# Patient Record
Sex: MALE | Race: BLACK OR AFRICAN AMERICAN | Employment: OTHER | URBAN - METROPOLITAN AREA
[De-identification: names, ages, dates, MRNs, and addresses within clinical notes are randomized per-mention and may not be internally consistent; named-entity substitution may affect disease eponyms.]

---

## 2022-03-18 PROBLEM — N43.3 HYDROCELE: Status: ACTIVE | Noted: 2021-03-17

## 2022-03-19 PROBLEM — R20.0 NUMBNESS IN BOTH LEGS: Status: ACTIVE | Noted: 2019-09-18

## 2022-03-19 PROBLEM — R25.3 FASCICULATIONS: Status: ACTIVE | Noted: 2019-09-18

## 2022-03-19 PROBLEM — E66.9 OBESITY (BMI 30-39.9): Status: ACTIVE | Noted: 2017-11-06

## 2022-03-19 PROBLEM — M79.89 SWELLING OF BOTH LOWER EXTREMITIES: Status: ACTIVE | Noted: 2021-04-22

## 2022-03-19 PROBLEM — J30.9 ALLERGIC RHINITIS: Status: ACTIVE | Noted: 2017-11-06

## 2022-03-19 PROBLEM — R49.9 VOICE DISORDER: Status: ACTIVE | Noted: 2018-08-13

## 2022-03-19 PROBLEM — R30.0 DYSURIA: Status: ACTIVE | Noted: 2018-08-13

## 2023-06-23 ENCOUNTER — TELEPHONE (OUTPATIENT)
Age: 63
End: 2023-06-23

## 2023-07-18 ENCOUNTER — HOSPITAL ENCOUNTER (EMERGENCY)
Facility: HOSPITAL | Age: 63
Discharge: HOME | End: 2023-07-18
Attending: STUDENT IN AN ORGANIZED HEALTH CARE EDUCATION/TRAINING PROGRAM
Payer: COMMERCIAL

## 2023-07-18 ENCOUNTER — APPOINTMENT (OUTPATIENT)
Facility: HOSPITAL | Age: 63
End: 2023-07-18
Payer: COMMERCIAL

## 2023-07-18 VITALS
SYSTOLIC BLOOD PRESSURE: 172 MMHG | DIASTOLIC BLOOD PRESSURE: 106 MMHG | RESPIRATION RATE: 18 BRPM | OXYGEN SATURATION: 95 % | WEIGHT: 287.7 LBS | BODY MASS INDEX: 40.28 KG/M2 | TEMPERATURE: 98.1 F | HEART RATE: 80 BPM | HEIGHT: 71 IN

## 2023-07-18 DIAGNOSIS — N43.3 HYDROCELE OF TESTIS: Primary | ICD-10-CM

## 2023-07-18 LAB
APPEARANCE UR: CLEAR
BACTERIA URNS QL MICRO: NEGATIVE /HPF
BILIRUB UR QL: NEGATIVE
COLOR UR: ABNORMAL
EPITH CASTS URNS QL MICRO: ABNORMAL /LPF
GLUCOSE UR STRIP.AUTO-MCNC: NEGATIVE MG/DL
HGB UR QL STRIP: NEGATIVE
HYALINE CASTS URNS QL MICRO: ABNORMAL /LPF (ref 0–2)
KETONES UR QL STRIP.AUTO: ABNORMAL MG/DL
LEUKOCYTE ESTERASE UR QL STRIP.AUTO: NEGATIVE
NITRITE UR QL STRIP.AUTO: NEGATIVE
PH UR STRIP: 5 (ref 5–8)
PROT UR STRIP-MCNC: NEGATIVE MG/DL
RBC #/AREA URNS HPF: ABNORMAL /HPF (ref 0–5)
SP GR UR REFRACTOMETRY: 1.02
URINE CULTURE IF INDICATED: ABNORMAL
UROBILINOGEN UR QL STRIP.AUTO: 1 EU/DL (ref 0.2–1)
WBC URNS QL MICRO: ABNORMAL /HPF (ref 0–4)

## 2023-07-18 PROCEDURE — 99284 EMERGENCY DEPT VISIT MOD MDM: CPT

## 2023-07-18 PROCEDURE — 81001 URINALYSIS AUTO W/SCOPE: CPT

## 2023-07-18 PROCEDURE — 76870 US EXAM SCROTUM: CPT

## 2023-07-18 ASSESSMENT — PAIN SCALES - GENERAL
PAINLEVEL_OUTOF10: 4
PAINLEVEL_OUTOF10: 5

## 2023-07-18 ASSESSMENT — LIFESTYLE VARIABLES
HOW OFTEN DO YOU HAVE A DRINK CONTAINING ALCOHOL: 2-4 TIMES A MONTH
HOW MANY STANDARD DRINKS CONTAINING ALCOHOL DO YOU HAVE ON A TYPICAL DAY: 1 OR 2

## 2023-07-18 ASSESSMENT — PAIN DESCRIPTION - LOCATION
LOCATION: SCROTUM
LOCATION: SCROTUM

## 2023-07-19 NOTE — DISCHARGE INSTRUCTIONS
You have a hydrocele in your testicles, this is a collection of fluid. A hydrocele is a collection of peritoneal fluid between the parietal and visceral layers of the tunica vaginalis, which directly surrounds the testis and spermatic cord. Hydroceles range in size from small, soft collections that still allow palpation of the scrotal contents to massive, tense collections of several liters that make examination hard. Pain and disability generally correlate with the size. Most hydroceles do not require intervention.

## 2023-07-19 NOTE — ED PROVIDER NOTES
EMERGENCY DEPARTMENT HISTORY AND PHYSICAL EXAM      Date: 7/18/2023  Patient Name: Trav Davila    History of Presenting Illness     Chief Complaint   Patient presents with    Groin Swelling     Patient presents to triage ambulatory complaining of bilateral scrotum swelling. Patient denies any recent falls, injuries or STD/STI exposure. HPI: History From: patient, History limited by: none  Trav Davila, 70 y.o. male presents to the ED with cc of \"hydrocele. \"  He reports being diagnosed with a hydrocele many years ago, he follows with a urologist who has drained it in the past, he states it was last drained about a year ago. It has been gradually increasing in size over the last few months. He reports associated discomfort due to the swelling. He denies any vomiting, diarrhea, fevers, dysuria or hematuria. He has an appointment with his urologist in August.          There are no other complaints, changes, or physical findings at this time. PCP: No primary care provider on file. No current facility-administered medications on file prior to encounter.      Current Outpatient Medications on File Prior to Encounter   Medication Sig Dispense Refill    aspirin 81 MG chewable tablet Take 1 tablet by mouth daily      cetirizine (ZYRTEC) 10 MG tablet Take by mouth      vitamin D 25 MCG (1000 UT) CAPS Take by mouth daily         Past History     Past Medical History:  Past Medical History:   Diagnosis Date    Allergic rhinitis 11/6/2017    Atrial fibrillation (HCC)     Constipation     Hemorrhoids     HTN (hypertension) 12/13/2012    Hypertension     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       Past Surgical History:  Past Surgical History:   Procedure Laterality Date    HEMORRHOID SURGERY  1993       Family History:  Family History   Problem Relation Age of Onset    Hypertension Mother     Diabetes Mother     Cancer Father

## 2023-07-19 NOTE — ED NOTES
Patient stable GCS15  Discharged summary explained and understood      Liborio De Leon RN  07/18/23 4319

## 2023-08-07 PROBLEM — N43.3 HYDROCELE: Status: ACTIVE | Noted: 2021-03-17

## 2023-08-10 ENCOUNTER — OFFICE VISIT (OUTPATIENT)
Age: 63
End: 2023-08-10
Payer: MEDICARE

## 2023-08-10 VITALS — OXYGEN SATURATION: 100 % | HEIGHT: 71 IN | TEMPERATURE: 97.8 F | WEIGHT: 255 LBS | BODY MASS INDEX: 35.7 KG/M2

## 2023-08-10 DIAGNOSIS — N43.3 HYDROCELE, UNSPECIFIED HYDROCELE TYPE: ICD-10-CM

## 2023-08-10 DIAGNOSIS — N48.83 ACQUIRED BURIED PENIS: ICD-10-CM

## 2023-08-10 PROCEDURE — G8417 CALC BMI ABV UP PARAM F/U: HCPCS | Performed by: UROLOGY

## 2023-08-10 PROCEDURE — G8427 DOCREV CUR MEDS BY ELIG CLIN: HCPCS | Performed by: UROLOGY

## 2023-08-10 PROCEDURE — 3017F COLORECTAL CA SCREEN DOC REV: CPT | Performed by: UROLOGY

## 2023-08-10 PROCEDURE — 99214 OFFICE O/P EST MOD 30 MIN: CPT | Performed by: UROLOGY

## 2023-08-10 PROCEDURE — 1123F ACP DISCUSS/DSCN MKR DOCD: CPT | Performed by: UROLOGY

## 2023-08-10 PROCEDURE — 81003 URINALYSIS AUTO W/O SCOPE: CPT | Performed by: UROLOGY

## 2023-08-10 PROCEDURE — 1036F TOBACCO NON-USER: CPT | Performed by: UROLOGY

## 2023-08-10 NOTE — ASSESSMENT & PLAN NOTE
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 This is not an immediate concern for him.

## 2023-08-10 NOTE — PROGRESS NOTES
HISTORY OF PRESENT ILLNESS  Tien Leung is a 70 y.o. male. has a past medical history of Allergic rhinitis, Atrial fibrillation ([address]), Constipation, Hemorrhoids, HTN (hypertension), Hypertension, Obesity (BMI 30-39.9), Other ill-defined conditions(799.89), and Voice disorder. has a past surgical history that includes Hemorrhoid surgery (1993). Chief Complaint   Patient presents with    Follow-up    Surgical Consult     Wants to discuss hydrocele      He last had a virtual visit with me December 8, 2021. We discussed surgical management of his hydrocele. He had deferred management and now comes in to discuss this again. He has a history of a large right hydrocele. It has been increasing in size. Left he found more uncomfortable and is ready to have surgical management. Other  This is a chronic problem. The current episode started more than 1 year ago. The problem occurs constantly. The problem has been gradually worsening. The symptoms are aggravated by standing and walking. Treatments tried: aspiration with recurrence. The treatment provided moderate relief. 1. Hydrocele, unspecified hydrocele type  Overview:  He has a prior hydrocele drainage 3/9/16 bilaterally, about 250cc each. He had deferred surgical management. He has preferred to repeat drainage. Assessment & Plan:   Large right hydrocele. We discussed surgical management. The risk, benefits and expected course were discussed. He is likely have postoperative swelling which is exposed expected to improve. He had the appearance questions and wishes to proceed. Orders:  -     AMB POC URINALYSIS DIP STICK AUTO W/O MICRO  -     AMB POC URINALYSIS DIP STICK AUTO W/O MICRO  2. 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 This is not an immediate concern for him.   Orders:  -     AMB POC URINALYSIS DIP STICK AUTO W/O MICRO        Past Medical History:    PMHx (including

## 2023-08-10 NOTE — ASSESSMENT & PLAN NOTE
Large right hydrocele. We discussed surgical management. The risk, benefits and expected course were discussed. He is likely have postoperative swelling which is exposed expected to improve. He had the appearance questions and wishes to proceed.

## 2023-08-11 LAB
BILIRUBIN, URINE, POC: NEGATIVE
BLOOD URINE, POC: NEGATIVE
GLUCOSE URINE, POC: NEGATIVE
KETONES, URINE, POC: NORMAL
LEUKOCYTE ESTERASE, URINE, POC: NEGATIVE
NITRITE, URINE, POC: NEGATIVE
PH, URINE, POC: 5 (ref 4.6–8)
PROTEIN,URINE, POC: NEGATIVE
SPECIFIC GRAVITY, URINE, POC: 1.03 (ref 1–1.03)
URINALYSIS CLARITY, POC: CLEAR
URINALYSIS COLOR, POC: YELLOW
UROBILINOGEN, POC: NORMAL

## 2023-10-16 ENCOUNTER — TELEPHONE (OUTPATIENT)
Age: 63
End: 2023-10-16

## 2023-10-20 ENCOUNTER — OFFICE VISIT (OUTPATIENT)
Age: 63
End: 2023-10-20

## 2023-10-20 VITALS — SYSTOLIC BLOOD PRESSURE: 158 MMHG | HEART RATE: 111 BPM | DIASTOLIC BLOOD PRESSURE: 109 MMHG

## 2023-10-20 DIAGNOSIS — N43.3 HYDROCELE, UNSPECIFIED HYDROCELE TYPE: ICD-10-CM

## 2023-10-20 PROCEDURE — 99024 POSTOP FOLLOW-UP VISIT: CPT | Performed by: UROLOGY

## 2023-10-20 NOTE — PROGRESS NOTES
Chief Complaint   Patient presents with    Follow-up    Other     hydrocelectomy        BP (!) 158/109   Pulse (!) 111      PHQ-9 score is    Negative      1. \"Have you been to the ER, urgent care clinic since your last visit? Hospitalized since your last visit? \" No    2. \"Have you seen or consulted any other health care providers outside of the [address] [place] [address] since your last visit? \" No     3. For patients aged 43-73: Has the patient had a colonoscopy / FIT/ Cologuard? Yes - no Care Gap present      If the patient is female:    4. For patients aged 43-66: Has the patient had a mammogram within the past 2 years? NA - based on age or sex      11. For patients aged 21-65: Has the patient had a pap smear?  NA - based on age or sex

## 2024-03-24 ENCOUNTER — APPOINTMENT (OUTPATIENT)
Facility: HOSPITAL | Age: 64
End: 2024-03-24
Payer: MEDICARE

## 2024-03-24 ENCOUNTER — HOSPITAL ENCOUNTER (EMERGENCY)
Facility: HOSPITAL | Age: 64
Discharge: ANOTHER HOSPITAL | End: 2024-03-24
Attending: EMERGENCY MEDICINE
Payer: MEDICARE

## 2024-03-24 ENCOUNTER — ANESTHESIA (OUTPATIENT)
Facility: HOSPITAL | Age: 64
End: 2024-03-24
Payer: MEDICARE

## 2024-03-24 ENCOUNTER — HOSPITAL ENCOUNTER (INPATIENT)
Facility: HOSPITAL | Age: 64
LOS: 4 days | Discharge: NURSING, REHAB OR LONG-TERM CARE | DRG: 064 | End: 2024-03-28
Attending: INTERNAL MEDICINE | Admitting: INTERNAL MEDICINE
Payer: MEDICARE

## 2024-03-24 ENCOUNTER — HOSPITAL ENCOUNTER (EMERGENCY)
Facility: HOSPITAL | Age: 64
Discharge: HOME | DRG: 064 | End: 2024-03-27
Payer: MEDICARE

## 2024-03-24 ENCOUNTER — APPOINTMENT (OUTPATIENT)
Facility: HOSPITAL | Age: 64
DRG: 064 | End: 2024-03-24
Payer: MEDICARE

## 2024-03-24 ENCOUNTER — ANESTHESIA EVENT (OUTPATIENT)
Facility: HOSPITAL | Age: 64
End: 2024-03-24
Payer: MEDICARE

## 2024-03-24 VITALS
RESPIRATION RATE: 14 BRPM | HEART RATE: 104 BPM | DIASTOLIC BLOOD PRESSURE: 86 MMHG | OXYGEN SATURATION: 99 % | SYSTOLIC BLOOD PRESSURE: 152 MMHG

## 2024-03-24 VITALS
WEIGHT: 272.71 LBS | RESPIRATION RATE: 17 BRPM | BODY MASS INDEX: 36.94 KG/M2 | HEIGHT: 72 IN | HEART RATE: 118 BPM | DIASTOLIC BLOOD PRESSURE: 117 MMHG | SYSTOLIC BLOOD PRESSURE: 146 MMHG | TEMPERATURE: 98.1 F | OXYGEN SATURATION: 87 %

## 2024-03-24 DIAGNOSIS — I63.512 CEREBROVASCULAR ACCIDENT (CVA) DUE TO OCCLUSION OF LEFT MIDDLE CEREBRAL ARTERY (HCC): Primary | ICD-10-CM

## 2024-03-24 DIAGNOSIS — I48.92 ATRIAL FLUTTER WITH RAPID VENTRICULAR RESPONSE (HCC): ICD-10-CM

## 2024-03-24 DIAGNOSIS — I63.9 ISCHEMIC STROKE (HCC): ICD-10-CM

## 2024-03-24 DIAGNOSIS — R94.30 CARDIAC LV EJECTION FRACTION 21-30%: ICD-10-CM

## 2024-03-24 DIAGNOSIS — I63.412 CEREBROVASCULAR ACCIDENT (CVA) DUE TO EMBOLISM OF LEFT MIDDLE CEREBRAL ARTERY (HCC): Primary | ICD-10-CM

## 2024-03-24 PROBLEM — I48.91 ATRIAL FIBRILLATION (HCC): Status: ACTIVE | Noted: 2024-03-24

## 2024-03-24 PROBLEM — R93.1 CARDIAC LV EJECTION FRACTION 21-30%: Status: ACTIVE | Noted: 2024-03-24

## 2024-03-24 LAB
ABO + RH BLD: NORMAL
ALBUMIN SERPL-MCNC: 3.9 G/DL (ref 3.5–5)
ALBUMIN/GLOB SERPL: 0.8 (ref 1.1–2.2)
ALP SERPL-CCNC: 86 U/L (ref 45–117)
ALT SERPL-CCNC: 48 U/L (ref 12–78)
AMMONIA PLAS-SCNC: 22 UMOL/L
AMPHET UR QL SCN: NEGATIVE
ANION GAP BLD CALC-SCNC: 14 (ref 10–20)
ANION GAP SERPL CALC-SCNC: 3 MMOL/L (ref 5–15)
ANION GAP SERPL CALC-SCNC: 5 MMOL/L (ref 5–15)
APPEARANCE UR: CLEAR
ARTERIAL PATENCY WRIST A: POSITIVE
AST SERPL-CCNC: 19 U/L (ref 15–37)
BACTERIA URNS QL MICRO: NEGATIVE /HPF
BARBITURATES UR QL SCN: NEGATIVE
BASE DEFICIT BLD-SCNC: 1.8 MMOL/L
BASE DEFICIT BLD-SCNC: 4.5 MMOL/L
BASOPHILS # BLD: 0 K/UL (ref 0–0.1)
BASOPHILS NFR BLD: 0 % (ref 0–1)
BDY SITE: ABNORMAL
BENZODIAZ UR QL: NEGATIVE
BILIRUB SERPL-MCNC: 1.2 MG/DL (ref 0.2–1)
BILIRUB UR QL: NEGATIVE
BLOOD GROUP ANTIBODIES SERPL: NORMAL
BUN SERPL-MCNC: 14 MG/DL (ref 6–20)
BUN SERPL-MCNC: 16 MG/DL (ref 6–20)
BUN/CREAT SERPL: 8 (ref 12–20)
BUN/CREAT SERPL: 8 (ref 12–20)
CA-I BLD-MCNC: 1.18 MMOL/L (ref 1.12–1.32)
CA-I BLD-SCNC: 1.22 MMOL/L (ref 1.12–1.32)
CALCIUM SERPL-MCNC: 9 MG/DL (ref 8.5–10.1)
CALCIUM SERPL-MCNC: 9.8 MG/DL (ref 8.5–10.1)
CANNABINOIDS UR QL SCN: NEGATIVE
CHLORIDE BLD-SCNC: 106 MMOL/L (ref 100–108)
CHLORIDE SERPL-SCNC: 107 MMOL/L (ref 97–108)
CHLORIDE SERPL-SCNC: 109 MMOL/L (ref 97–108)
CHOLEST SERPL-MCNC: 109 MG/DL
CO2 BLD-SCNC: 20 MMOL/L (ref 19–24)
CO2 SERPL-SCNC: 24 MMOL/L (ref 21–32)
CO2 SERPL-SCNC: 25 MMOL/L (ref 21–32)
COCAINE UR QL SCN: NEGATIVE
COLOR UR: NORMAL
CREAT SERPL-MCNC: 1.65 MG/DL (ref 0.7–1.3)
CREAT SERPL-MCNC: 1.9 MG/DL (ref 0.7–1.3)
CREAT UR-MCNC: 1.4 MG/DL (ref 0.6–1.3)
DIFFERENTIAL METHOD BLD: NORMAL
ECHO AV AREA PEAK VELOCITY: 6.1 CM2
ECHO AV AREA/BSA PEAK VELOCITY: 2.5 CM2/M2
ECHO AV PEAK GRADIENT: 2 MMHG
ECHO AV PEAK VELOCITY: 0.7 M/S
ECHO AV VELOCITY RATIO: 1
ECHO BSA: 2.51 M2
ECHO EST RA PRESSURE: 15 MMHG
ECHO LV EDV A2C: 99 ML
ECHO LV EDV A4C: 100 ML
ECHO LV EDV BP: 102 ML (ref 67–155)
ECHO LV EDV INDEX A4C: 41 ML/M2
ECHO LV EDV INDEX BP: 42 ML/M2
ECHO LV EDV NDEX A2C: 41 ML/M2
ECHO LV EJECTION FRACTION A2C: 16 %
ECHO LV EJECTION FRACTION A2C: 21 %
ECHO LV EJECTION FRACTION A4C: 27 %
ECHO LV EJECTION FRACTION A4C: 36 %
ECHO LV EJECTION FRACTION BIPLANE: 26 % (ref 55–100)
ECHO LV ESV A2C: 84 ML
ECHO LV ESV A4C: 65 ML
ECHO LV ESV BP: 76 ML (ref 22–58)
ECHO LV ESV INDEX A2C: 35 ML/M2
ECHO LV ESV INDEX A4C: 27 ML/M2
ECHO LV ESV INDEX BP: 31 ML/M2
ECHO LV INTERNAL DIMENSION DIASTOLIC MMODE: 4.7 CM (ref 4.2–5.9)
ECHO LV IVSD MMODE: 1.7 CM (ref 0.6–1)
ECHO LV IVSS MMODE: 1.5 CM
ECHO LV POSTERIOR WALL DIASTOLIC MMODE: 1.7 CM (ref 0.6–1)
ECHO LVOT AREA: 6.6 CM2
ECHO LVOT DIAM: 2.9 CM
ECHO LVOT PEAK GRADIENT: 2 MMHG
ECHO LVOT PEAK VELOCITY: 0.7 M/S
ECHO MV A VELOCITY: 0.28 M/S
ECHO MV E VELOCITY: 0.51 M/S
ECHO MV E/A RATIO: 1.82
ECHO MV REGURGITANT PEAK GRADIENT: 64 MMHG
ECHO MV REGURGITANT PEAK VELOCITY: 4 M/S
ECHO RIGHT VENTRICULAR SYSTOLIC PRESSURE (RVSP): 36 MMHG
ECHO RV TAPSE: 0.6 CM (ref 1.7–?)
ECHO TV REGURGITANT MAX VELOCITY: 2.3 M/S
ECHO TV REGURGITANT PEAK GRADIENT: 21 MMHG
EKG ATRIAL RATE: 318 BPM
EKG DIAGNOSIS: NORMAL
EKG Q-T INTERVAL: 290 MS
EKG QRS DURATION: 80 MS
EKG QTC CALCULATION (BAZETT): 494 MS
EKG R AXIS: 34 DEGREES
EKG T AXIS: -76 DEGREES
EKG VENTRICULAR RATE: 175 BPM
EOSINOPHIL # BLD: 0 K/UL (ref 0–0.4)
EOSINOPHIL NFR BLD: 0 % (ref 0–7)
EPITH CASTS URNS QL MICRO: NORMAL /LPF
ERYTHROCYTE [DISTWIDTH] IN BLOOD BY AUTOMATED COUNT: 13.9 % (ref 11.5–14.5)
ERYTHROCYTE [DISTWIDTH] IN BLOOD BY AUTOMATED COUNT: 14 % (ref 11.5–14.5)
EST. AVERAGE GLUCOSE BLD GHB EST-MCNC: 117 MG/DL
ETHANOL SERPL-MCNC: <10 MG/DL (ref 0–0.08)
GAS FLOW.O2 O2 DELIVERY SYS: ABNORMAL
GAS FLOW.O2 SETTING OXYMISER: 18 BPM
GLOBULIN SER CALC-MCNC: 4.7 G/DL (ref 2–4)
GLUCOSE BLD STRIP.AUTO-MCNC: 132 MG/DL (ref 74–106)
GLUCOSE BLD STRIP.AUTO-MCNC: 149 MG/DL (ref 65–117)
GLUCOSE SERPL-MCNC: 146 MG/DL (ref 65–100)
GLUCOSE SERPL-MCNC: 149 MG/DL (ref 65–100)
GLUCOSE UR STRIP.AUTO-MCNC: NEGATIVE MG/DL
HBA1C MFR BLD: 5.7 % (ref 4–5.6)
HBV SURFACE AG SER QL: <0.1 INDEX
HBV SURFACE AG SER QL: NEGATIVE
HCO3 BLD-SCNC: 22.4 MMOL/L (ref 22–26)
HCO3 BLDA-SCNC: 20 MMOL/L
HCT VFR BLD AUTO: 40.2 % (ref 36.6–50.3)
HCT VFR BLD AUTO: 44.4 % (ref 36.6–50.3)
HCV AB SER IA-ACNC: <0.02 INDEX
HCV AB SERPL QL IA: NONREACTIVE
HDLC SERPL-MCNC: 31 MG/DL
HDLC SERPL: 3.5 (ref 0–5)
HGB BLD-MCNC: 13.5 G/DL (ref 12.1–17)
HGB BLD-MCNC: 14.4 G/DL (ref 12.1–17)
HGB UR QL STRIP: NEGATIVE
HIV1 P24 AG SERPL QL IA: NONREACTIVE
HIV1+2 AB SERPL QL IA: NONREACTIVE
IMM GRANULOCYTES # BLD AUTO: 0 K/UL (ref 0–0.04)
IMM GRANULOCYTES NFR BLD AUTO: 0 % (ref 0–0.5)
INR PPP: 1.1 (ref 0.9–1.1)
INR PPP: 1.1 (ref 0.9–1.1)
KETONES UR QL STRIP.AUTO: NEGATIVE MG/DL
LACTATE BLD-SCNC: 5.01 MMOL/L (ref 0.4–2)
LACTATE SERPL-SCNC: 2.3 MMOL/L (ref 0.4–2)
LDLC SERPL CALC-MCNC: 59.6 MG/DL (ref 0–100)
LEUKOCYTE ESTERASE UR QL STRIP.AUTO: NEGATIVE
LYMPHOCYTES # BLD: 2.2 K/UL (ref 0.8–3.5)
LYMPHOCYTES NFR BLD: 27 % (ref 12–49)
Lab: NORMAL
MAGNESIUM SERPL-MCNC: 2 MG/DL (ref 1.6–2.4)
MCH RBC QN AUTO: 30.7 PG (ref 26–34)
MCH RBC QN AUTO: 31 PG (ref 26–34)
MCHC RBC AUTO-ENTMCNC: 32.4 G/DL (ref 30–36.5)
MCHC RBC AUTO-ENTMCNC: 33.6 G/DL (ref 30–36.5)
MCV RBC AUTO: 92.4 FL (ref 80–99)
MCV RBC AUTO: 94.7 FL (ref 80–99)
METHADONE UR QL: NEGATIVE
MONOCYTES # BLD: 0.8 K/UL (ref 0–1)
MONOCYTES NFR BLD: 9 % (ref 5–13)
NEUTS SEG # BLD: 5.3 K/UL (ref 1.8–8)
NEUTS SEG NFR BLD: 64 % (ref 32–75)
NITRITE UR QL STRIP.AUTO: NEGATIVE
NRBC # BLD: 0 K/UL (ref 0–0.01)
NRBC # BLD: 0 K/UL (ref 0–0.01)
NRBC BLD-RTO: 0 PER 100 WBC
NRBC BLD-RTO: 0 PER 100 WBC
O2/TOTAL GAS SETTING VFR VENT: 70 %
OPIATES UR QL: NEGATIVE
PCO2 BLD: 35.9 MMHG (ref 35–45)
PCO2 BLDV: 35.7 MMHG (ref 41–51)
PCP UR QL: NEGATIVE
PEEP RESPIRATORY: 5 CMH2O
PH BLD: 7.4 (ref 7.35–7.45)
PH BLDV: 7.36 (ref 7.32–7.42)
PH UR STRIP: 5.5 (ref 5–8)
PLATELET # BLD AUTO: 290 K/UL (ref 150–400)
PLATELET # BLD AUTO: 338 K/UL (ref 150–400)
PMV BLD AUTO: 10.2 FL (ref 8.9–12.9)
PMV BLD AUTO: 9.9 FL (ref 8.9–12.9)
PO2 BLD: 261 MMHG (ref 80–100)
PO2 BLDV: 29 MMHG (ref 25–40)
POTASSIUM BLD-SCNC: 3.5 MMOL/L (ref 3.5–5.5)
POTASSIUM SERPL-SCNC: 3.7 MMOL/L (ref 3.5–5.1)
POTASSIUM SERPL-SCNC: 4.5 MMOL/L (ref 3.5–5.1)
PROT SERPL-MCNC: 8.6 G/DL (ref 6.4–8.2)
PROT UR STRIP-MCNC: NEGATIVE MG/DL
PROTHROMBIN TIME: 11.4 SEC (ref 9–11.1)
PROTHROMBIN TIME: 11.7 SEC (ref 9–11.1)
RBC # BLD AUTO: 4.35 M/UL (ref 4.1–5.7)
RBC # BLD AUTO: 4.69 M/UL (ref 4.1–5.7)
RBC #/AREA URNS HPF: NORMAL /HPF (ref 0–5)
SAO2 % BLD: 53 %
SAO2 % BLD: 99.9 % (ref 92–97)
SERVICE CMNT-IMP: ABNORMAL
SERVICE CMNT-IMP: ABNORMAL
SODIUM BLD-SCNC: 140 MMOL/L (ref 136–145)
SODIUM SERPL-SCNC: 136 MMOL/L (ref 136–145)
SODIUM SERPL-SCNC: 137 MMOL/L (ref 136–145)
SP GR UR REFRACTOMETRY: 1.01 (ref 1–1.03)
SPECIMEN EXP DATE BLD: NORMAL
SPECIMEN SITE: ABNORMAL
SPECIMEN TYPE: ABNORMAL
TRIGL SERPL-MCNC: 92 MG/DL
TROPONIN I SERPL HS-MCNC: 16 NG/L (ref 0–76)
TROPONIN I SERPL HS-MCNC: 26 NG/L (ref 0–76)
TSH SERPL DL<=0.05 MIU/L-ACNC: 4.88 UIU/ML (ref 0.36–3.74)
URINE CULTURE IF INDICATED: NORMAL
UROBILINOGEN UR QL STRIP.AUTO: 1 EU/DL (ref 0.2–1)
VENTILATION MODE VENT: ABNORMAL
VLDLC SERPL CALC-MCNC: 18.4 MG/DL
VT SETTING VENT: 450 ML
WBC # BLD AUTO: 8.4 K/UL (ref 4.1–11.1)
WBC # BLD AUTO: 8.5 K/UL (ref 4.1–11.1)
WBC URNS QL MICRO: NORMAL /HPF (ref 0–4)

## 2024-03-24 PROCEDURE — 83605 ASSAY OF LACTIC ACID: CPT

## 2024-03-24 PROCEDURE — 84295 ASSAY OF SERUM SODIUM: CPT

## 2024-03-24 PROCEDURE — 84484 ASSAY OF TROPONIN QUANT: CPT

## 2024-03-24 PROCEDURE — 6360000002 HC RX W HCPCS: Performed by: RADIOLOGY

## 2024-03-24 PROCEDURE — 82947 ASSAY GLUCOSE BLOOD QUANT: CPT

## 2024-03-24 PROCEDURE — 36415 COLL VENOUS BLD VENIPUNCTURE: CPT

## 2024-03-24 PROCEDURE — 82140 ASSAY OF AMMONIA: CPT

## 2024-03-24 PROCEDURE — 82330 ASSAY OF CALCIUM: CPT

## 2024-03-24 PROCEDURE — 80048 BASIC METABOLIC PNL TOTAL CA: CPT

## 2024-03-24 PROCEDURE — 70498 CT ANGIOGRAPHY NECK: CPT

## 2024-03-24 PROCEDURE — APPNB45 APP NON BILLABLE 31-45 MINUTES

## 2024-03-24 PROCEDURE — 03JY3ZZ INSPECTION OF UPPER ARTERY, PERCUTANEOUS APPROACH: ICD-10-PCS | Performed by: RADIOLOGY

## 2024-03-24 PROCEDURE — 2580000003 HC RX 258: Performed by: RADIOLOGY

## 2024-03-24 PROCEDURE — 2500000003 HC RX 250 WO HCPCS: Performed by: EMERGENCY MEDICINE

## 2024-03-24 PROCEDURE — 6360000004 HC RX CONTRAST MEDICATION: Performed by: EMERGENCY MEDICINE

## 2024-03-24 PROCEDURE — 99285 EMERGENCY DEPT VISIT HI MDM: CPT

## 2024-03-24 PROCEDURE — 36600 WITHDRAWAL OF ARTERIAL BLOOD: CPT

## 2024-03-24 PROCEDURE — 96374 THER/PROPH/DIAG INJ IV PUSH: CPT

## 2024-03-24 PROCEDURE — 82077 ASSAY SPEC XCP UR&BREATH IA: CPT

## 2024-03-24 PROCEDURE — APPNB45 APP NON BILLABLE 31-45 MINUTES: Performed by: NURSE PRACTITIONER

## 2024-03-24 PROCEDURE — 80053 COMPREHEN METABOLIC PANEL: CPT

## 2024-03-24 PROCEDURE — 6360000002 HC RX W HCPCS: Performed by: NURSE PRACTITIONER

## 2024-03-24 PROCEDURE — 2500000003 HC RX 250 WO HCPCS: Performed by: RADIOLOGY

## 2024-03-24 PROCEDURE — 70450 CT HEAD/BRAIN W/O DYE: CPT

## 2024-03-24 PROCEDURE — 74018 RADEX ABDOMEN 1 VIEW: CPT

## 2024-03-24 PROCEDURE — 82803 BLOOD GASES ANY COMBINATION: CPT

## 2024-03-24 PROCEDURE — 2500000003 HC RX 250 WO HCPCS: Performed by: NURSE ANESTHETIST, CERTIFIED REGISTERED

## 2024-03-24 PROCEDURE — A4216 STERILE WATER/SALINE, 10 ML: HCPCS | Performed by: NURSE PRACTITIONER

## 2024-03-24 PROCEDURE — 51701 INSERT BLADDER CATHETER: CPT

## 2024-03-24 PROCEDURE — 85025 COMPLETE CBC W/AUTO DIFF WBC: CPT

## 2024-03-24 PROCEDURE — 99223 1ST HOSP IP/OBS HIGH 75: CPT | Performed by: PSYCHIATRY & NEUROLOGY

## 2024-03-24 PROCEDURE — 86850 RBC ANTIBODY SCREEN: CPT

## 2024-03-24 PROCEDURE — 6360000002 HC RX W HCPCS: Performed by: NURSE ANESTHETIST, CERTIFIED REGISTERED

## 2024-03-24 PROCEDURE — 6370000000 HC RX 637 (ALT 250 FOR IP): Performed by: NURSE PRACTITIONER

## 2024-03-24 PROCEDURE — 84443 ASSAY THYROID STIM HORMONE: CPT

## 2024-03-24 PROCEDURE — 80061 LIPID PANEL: CPT

## 2024-03-24 PROCEDURE — 80307 DRUG TEST PRSMV CHEM ANLYZR: CPT

## 2024-03-24 PROCEDURE — 86901 BLOOD TYPING SEROLOGIC RH(D): CPT

## 2024-03-24 PROCEDURE — 81001 URINALYSIS AUTO W/SCOPE: CPT

## 2024-03-24 PROCEDURE — 94002 VENT MGMT INPAT INIT DAY: CPT

## 2024-03-24 PROCEDURE — 2580000003 HC RX 258: Performed by: NURSE PRACTITIONER

## 2024-03-24 PROCEDURE — 83036 HEMOGLOBIN GLYCOSYLATED A1C: CPT

## 2024-03-24 PROCEDURE — 82962 GLUCOSE BLOOD TEST: CPT

## 2024-03-24 PROCEDURE — APPNB30 APP NON BILLABLE TIME 0-30 MINS: Performed by: NURSE PRACTITIONER

## 2024-03-24 PROCEDURE — 2720000010 IR MECHANICAL ART THROMBECTOMY INTRACRANIAL

## 2024-03-24 PROCEDURE — 85027 COMPLETE CBC AUTOMATED: CPT

## 2024-03-24 PROCEDURE — 51798 US URINE CAPACITY MEASURE: CPT

## 2024-03-24 PROCEDURE — 2580000003 HC RX 258

## 2024-03-24 PROCEDURE — 83735 ASSAY OF MAGNESIUM: CPT

## 2024-03-24 PROCEDURE — 2500000003 HC RX 250 WO HCPCS: Performed by: NURSE PRACTITIONER

## 2024-03-24 PROCEDURE — 2580000003 HC RX 258: Performed by: NURSE ANESTHETIST, CERTIFIED REGISTERED

## 2024-03-24 PROCEDURE — 85610 PROTHROMBIN TIME: CPT

## 2024-03-24 PROCEDURE — 2000000000 HC ICU R&B

## 2024-03-24 PROCEDURE — 6370000000 HC RX 637 (ALT 250 FOR IP): Performed by: PSYCHIATRY & NEUROLOGY

## 2024-03-24 PROCEDURE — 93306 TTE W/DOPPLER COMPLETE: CPT

## 2024-03-24 PROCEDURE — 70551 MRI BRAIN STEM W/O DYE: CPT

## 2024-03-24 PROCEDURE — 6360000004 HC RX CONTRAST MEDICATION: Performed by: RADIOLOGY

## 2024-03-24 PROCEDURE — 0042T CT BRAIN PERFUSION: CPT

## 2024-03-24 PROCEDURE — 74174 CTA ABD&PLVS W/CONTRAST: CPT

## 2024-03-24 PROCEDURE — 84132 ASSAY OF SERUM POTASSIUM: CPT

## 2024-03-24 PROCEDURE — 93308 TTE F-UP OR LMTD: CPT

## 2024-03-24 PROCEDURE — 86900 BLOOD TYPING SEROLOGIC ABO: CPT

## 2024-03-24 RX ORDER — LIDOCAINE HYDROCHLORIDE 20 MG/ML
INJECTION, SOLUTION EPIDURAL; INFILTRATION; INTRACAUDAL; PERINEURAL PRN
Status: DISCONTINUED | OUTPATIENT
Start: 2024-03-24 | End: 2024-03-24 | Stop reason: SDUPTHER

## 2024-03-24 RX ORDER — FENTANYL CITRATE 50 UG/ML
50 INJECTION, SOLUTION INTRAMUSCULAR; INTRAVENOUS
Status: DISCONTINUED | OUTPATIENT
Start: 2024-03-24 | End: 2024-03-24 | Stop reason: HOSPADM

## 2024-03-24 RX ORDER — ASPIRIN 300 MG/1
300 SUPPOSITORY RECTAL DAILY
Status: DISCONTINUED | OUTPATIENT
Start: 2024-03-24 | End: 2024-03-25

## 2024-03-24 RX ORDER — CHLORHEXIDINE GLUCONATE ORAL RINSE 1.2 MG/ML
15 SOLUTION DENTAL 2 TIMES DAILY
Status: DISCONTINUED | OUTPATIENT
Start: 2024-03-24 | End: 2024-03-25

## 2024-03-24 RX ORDER — SODIUM CHLORIDE 0.9 % (FLUSH) 0.9 %
5-40 SYRINGE (ML) INJECTION PRN
Status: DISCONTINUED | OUTPATIENT
Start: 2024-03-24 | End: 2024-03-28 | Stop reason: HOSPADM

## 2024-03-24 RX ORDER — ETOMIDATE 2 MG/ML
10 INJECTION INTRAVENOUS
Status: COMPLETED | OUTPATIENT
Start: 2024-03-24 | End: 2024-03-24

## 2024-03-24 RX ORDER — PHENYLEPHRINE HYDROCHLORIDE 10 MG/ML
INJECTION INTRAVENOUS
Status: DISPENSED
Start: 2024-03-24 | End: 2024-03-24

## 2024-03-24 RX ORDER — 0.9 % SODIUM CHLORIDE 0.9 %
1000 INTRAVENOUS SOLUTION INTRAVENOUS ONCE
Status: DISCONTINUED | OUTPATIENT
Start: 2024-03-24 | End: 2024-03-24 | Stop reason: HOSPADM

## 2024-03-24 RX ORDER — MIDODRINE HYDROCHLORIDE 5 MG/1
5 TABLET ORAL
Status: DISCONTINUED | OUTPATIENT
Start: 2024-03-25 | End: 2024-03-26

## 2024-03-24 RX ORDER — SODIUM CHLORIDE 9 MG/ML
INJECTION, SOLUTION INTRAVENOUS CONTINUOUS PRN
Status: DISCONTINUED | OUTPATIENT
Start: 2024-03-24 | End: 2024-03-24 | Stop reason: SDUPTHER

## 2024-03-24 RX ORDER — ONDANSETRON 2 MG/ML
4 INJECTION INTRAMUSCULAR; INTRAVENOUS EVERY 6 HOURS PRN
Status: DISCONTINUED | OUTPATIENT
Start: 2024-03-24 | End: 2024-03-28 | Stop reason: HOSPADM

## 2024-03-24 RX ORDER — PHENYLEPHRINE HCL IN 0.9% NACL 0.4MG/10ML
SYRINGE (ML) INTRAVENOUS PRN
Status: DISCONTINUED | OUTPATIENT
Start: 2024-03-24 | End: 2024-03-24 | Stop reason: SDUPTHER

## 2024-03-24 RX ORDER — ROCURONIUM BROMIDE 10 MG/ML
INJECTION, SOLUTION INTRAVENOUS PRN
Status: DISCONTINUED | OUTPATIENT
Start: 2024-03-24 | End: 2024-03-24 | Stop reason: SDUPTHER

## 2024-03-24 RX ORDER — SODIUM CHLORIDE 9 MG/ML
INJECTION, SOLUTION INTRAVENOUS PRN
Status: DISCONTINUED | OUTPATIENT
Start: 2024-03-24 | End: 2024-03-28 | Stop reason: HOSPADM

## 2024-03-24 RX ORDER — ATORVASTATIN CALCIUM 40 MG/1
40 TABLET, FILM COATED ORAL NIGHTLY
Status: DISCONTINUED | OUTPATIENT
Start: 2024-03-24 | End: 2024-03-28 | Stop reason: HOSPADM

## 2024-03-24 RX ORDER — BISACODYL 10 MG
10 SUPPOSITORY, RECTAL RECTAL DAILY PRN
Status: DISCONTINUED | OUTPATIENT
Start: 2024-03-24 | End: 2024-03-28 | Stop reason: HOSPADM

## 2024-03-24 RX ORDER — ATORVASTATIN CALCIUM 40 MG/1
80 TABLET, FILM COATED ORAL NIGHTLY
Status: DISCONTINUED | OUTPATIENT
Start: 2024-03-24 | End: 2024-03-24

## 2024-03-24 RX ORDER — SODIUM CHLORIDE 0.9 % (FLUSH) 0.9 %
5-40 SYRINGE (ML) INJECTION EVERY 12 HOURS SCHEDULED
Status: DISCONTINUED | OUTPATIENT
Start: 2024-03-24 | End: 2024-03-28 | Stop reason: HOSPADM

## 2024-03-24 RX ORDER — ONDANSETRON 2 MG/ML
INJECTION INTRAMUSCULAR; INTRAVENOUS PRN
Status: DISCONTINUED | OUTPATIENT
Start: 2024-03-24 | End: 2024-03-24 | Stop reason: SDUPTHER

## 2024-03-24 RX ORDER — MIDODRINE HYDROCHLORIDE 5 MG/1
10 TABLET ORAL ONCE
Status: COMPLETED | OUTPATIENT
Start: 2024-03-24 | End: 2024-03-24

## 2024-03-24 RX ORDER — LIDOCAINE HYDROCHLORIDE 10 MG/ML
INJECTION, SOLUTION EPIDURAL; INFILTRATION; INTRACAUDAL; PERINEURAL PRN
Status: COMPLETED | OUTPATIENT
Start: 2024-03-24 | End: 2024-03-24

## 2024-03-24 RX ORDER — SODIUM CHLORIDE, SODIUM LACTATE, POTASSIUM CHLORIDE, CALCIUM CHLORIDE 600; 310; 30; 20 MG/100ML; MG/100ML; MG/100ML; MG/100ML
INJECTION, SOLUTION INTRAVENOUS CONTINUOUS
Status: DISCONTINUED | OUTPATIENT
Start: 2024-03-24 | End: 2024-03-26

## 2024-03-24 RX ORDER — DEXMEDETOMIDINE HYDROCHLORIDE 4 UG/ML
.1-1.5 INJECTION, SOLUTION INTRAVENOUS CONTINUOUS
Status: DISCONTINUED | OUTPATIENT
Start: 2024-03-24 | End: 2024-03-26

## 2024-03-24 RX ORDER — SUCCINYLCHOLINE/SOD CL,ISO/PF 200MG/10ML
SYRINGE (ML) INTRAVENOUS PRN
Status: DISCONTINUED | OUTPATIENT
Start: 2024-03-24 | End: 2024-03-24 | Stop reason: SDUPTHER

## 2024-03-24 RX ORDER — EPHEDRINE SULFATE 50 MG/ML
INJECTION INTRAVENOUS PRN
Status: DISCONTINUED | OUTPATIENT
Start: 2024-03-24 | End: 2024-03-24 | Stop reason: SDUPTHER

## 2024-03-24 RX ORDER — PROPOFOL 10 MG/ML
5-50 INJECTION, EMULSION INTRAVENOUS CONTINUOUS
Status: DISCONTINUED | OUTPATIENT
Start: 2024-03-24 | End: 2024-03-25

## 2024-03-24 RX ADMIN — SODIUM CHLORIDE: 9 INJECTION, SOLUTION INTRAVENOUS at 02:48

## 2024-03-24 RX ADMIN — DEXMEDETOMIDINE HYDROCHLORIDE 0.8 MCG/KG/HR: 400 INJECTION, SOLUTION INTRAVENOUS at 16:32

## 2024-03-24 RX ADMIN — ATORVASTATIN CALCIUM 40 MG: 40 TABLET, FILM COATED ORAL at 20:50

## 2024-03-24 RX ADMIN — SODIUM CHLORIDE, POTASSIUM CHLORIDE, SODIUM LACTATE AND CALCIUM CHLORIDE: 600; 310; 30; 20 INJECTION, SOLUTION INTRAVENOUS at 16:33

## 2024-03-24 RX ADMIN — ETOMIDATE 10 MG: 2 INJECTION, SOLUTION INTRAVENOUS at 01:07

## 2024-03-24 RX ADMIN — ASPIRIN 300 MG: 300 SUPPOSITORY RECTAL at 11:33

## 2024-03-24 RX ADMIN — SODIUM CHLORIDE, PRESERVATIVE FREE 10 ML: 5 INJECTION INTRAVENOUS at 20:36

## 2024-03-24 RX ADMIN — PHENYLEPHRINE HYDROCHLORIDE 40 MCG/MIN: 10 INJECTION INTRAVENOUS at 02:59

## 2024-03-24 RX ADMIN — PROPOFOL 20 MCG/KG/MIN: 10 INJECTION, EMULSION INTRAVENOUS at 05:09

## 2024-03-24 RX ADMIN — CHLORHEXIDINE GLUCONATE 15 ML: 1.2 RINSE ORAL at 20:23

## 2024-03-24 RX ADMIN — DEXMEDETOMIDINE HYDROCHLORIDE 0.8 MCG/KG/HR: 400 INJECTION, SOLUTION INTRAVENOUS at 08:02

## 2024-03-24 RX ADMIN — DEXMEDETOMIDINE HYDROCHLORIDE 1 MCG/KG/HR: 400 INJECTION, SOLUTION INTRAVENOUS at 04:56

## 2024-03-24 RX ADMIN — PHENYLEPHRINE HYDROCHLORIDE 50 MCG/MIN: 10 INJECTION INTRAVENOUS at 16:34

## 2024-03-24 RX ADMIN — ONDANSETRON 4 MG: 2 INJECTION INTRAMUSCULAR; INTRAVENOUS at 04:04

## 2024-03-24 RX ADMIN — HEPARIN SODIUM: 1000 INJECTION INTRAVENOUS; SUBCUTANEOUS at 03:05

## 2024-03-24 RX ADMIN — MIDODRINE HYDROCHLORIDE 10 MG: 5 TABLET ORAL at 20:36

## 2024-03-24 RX ADMIN — ROCURONIUM BROMIDE 50 MG: 10 SOLUTION INTRAVENOUS at 02:57

## 2024-03-24 RX ADMIN — DEXMEDETOMIDINE HYDROCHLORIDE 0.7 MCG/KG/HR: 400 INJECTION, SOLUTION INTRAVENOUS at 19:09

## 2024-03-24 RX ADMIN — LIDOCAINE HYDROCHLORIDE 10 ML: 10 INJECTION, SOLUTION EPIDURAL; INFILTRATION; INTRACAUDAL; PERINEURAL at 03:00

## 2024-03-24 RX ADMIN — Medication 120 MCG: at 03:03

## 2024-03-24 RX ADMIN — LIDOCAINE HYDROCHLORIDE 100 MG: 20 INJECTION, SOLUTION EPIDURAL; INFILTRATION; INTRACAUDAL; PERINEURAL at 02:52

## 2024-03-24 RX ADMIN — CHLORHEXIDINE GLUCONATE 15 ML: 1.2 RINSE ORAL at 08:04

## 2024-03-24 RX ADMIN — EPHEDRINE SULFATE 15 MG: 50 INJECTION INTRAVENOUS at 03:09

## 2024-03-24 RX ADMIN — Medication 200 MG: at 02:52

## 2024-03-24 RX ADMIN — IOPAMIDOL 100 ML: 755 INJECTION, SOLUTION INTRAVENOUS at 01:35

## 2024-03-24 RX ADMIN — PHENYLEPHRINE HYDROCHLORIDE 60 MCG/MIN: 10 INJECTION INTRAVENOUS at 04:53

## 2024-03-24 RX ADMIN — Medication 120 MCG: at 03:06

## 2024-03-24 RX ADMIN — SODIUM CHLORIDE, POTASSIUM CHLORIDE, SODIUM LACTATE AND CALCIUM CHLORIDE: 600; 310; 30; 20 INJECTION, SOLUTION INTRAVENOUS at 04:41

## 2024-03-24 RX ADMIN — Medication 120 MCG: at 03:10

## 2024-03-24 RX ADMIN — PROPOFOL 200 MG: 10 INJECTION, EMULSION INTRAVENOUS at 02:52

## 2024-03-24 RX ADMIN — FAMOTIDINE 20 MG: 10 INJECTION, SOLUTION INTRAVENOUS at 20:51

## 2024-03-24 RX ADMIN — FAMOTIDINE 20 MG: 10 INJECTION, SOLUTION INTRAVENOUS at 08:02

## 2024-03-24 RX ADMIN — ROCURONIUM BROMIDE 10 MG: 10 SOLUTION INTRAVENOUS at 03:44

## 2024-03-24 RX ADMIN — DEXMEDETOMIDINE HYDROCHLORIDE 0.7 MCG/KG/HR: 100 INJECTION, SOLUTION, CONCENTRATE INTRAVENOUS at 03:27

## 2024-03-24 RX ADMIN — EPHEDRINE SULFATE 10 MG: 50 INJECTION INTRAVENOUS at 03:18

## 2024-03-24 RX ADMIN — IOPAMIDOL 88 ML: 612 INJECTION, SOLUTION INTRAVENOUS at 04:00

## 2024-03-24 ASSESSMENT — PULMONARY FUNCTION TESTS
PIF_VALUE: 16
PIF_VALUE: 17
PIF_VALUE: 20
PIF_VALUE: 16
PIF_VALUE: 17

## 2024-03-24 ASSESSMENT — PAIN - FUNCTIONAL ASSESSMENT: PAIN_FUNCTIONAL_ASSESSMENT: NONE - DENIES PAIN

## 2024-03-24 NOTE — PROGRESS NOTES
0245: Pt arrives via stretcher/transfer to angio/IR department for diagnotic cerebral angiogram and mechanical thrombectomy with arteriotomy closure device procedure with general anesthesia. Patient placed on angio table with assistance. IR Staff and anesthesia present at bedside. Anesthesia to remain at bedside to manage pt airway, medications, VS and pt status.    0425: TRANSFER - OUT REPORT:    Verbal report given to Elvi SNOWDEN on Taj Clemens  being transferred to ICU 2 for routine post-op       Report consisted of patient's Situation, Background, Assessment and   Recommendations(SBAR).     Information from the following report(s) Adult Overview, Surgery Report, Intake/Output, MAR, Cardiac Rhythm Afib-RVR, Alarm Parameters, and Neuro Assessment was reviewed with the receiving nurse.           Lines:   Peripheral IV 03/24/24 Left Antecubital (Active)        Opportunity for questions and clarification was provided.      Patient transported with:  Monitor, O2, RN, CRNA, and MD

## 2024-03-24 NOTE — ED PROVIDER NOTES
medications:  Medications   etomidate (AMIDATE) injection 10 mg (10 mg IntraVENous Given 3/24/24 0107)   iopamidol (ISOVUE-370) 76 % injection 100 mL (100 mLs IntraVENous Given 3/24/24 0135)       Medical Decision Making  63-year-old male presenting altered, he appears to have a Warnicke's aphasia.  I do not see any obvious lateralizing deficits.  Blood sugar is normal.  On arrival his heart rate was 200, he was in a flutter with a rapid ventricular response.  His blood pressure is elevated.  His friend denies any drug use, denies any alcohol use.  He was placed in a room immediately and be started become aggressive with staff and fighting against treatment.  It is obvious that in his acute condition he does not have capacity to make decisions.  Initially there was some concern that his heart rate of close to 200 was causing decreased cerebral perfusion so patient was given 10 of etomidate and synchronized cardioverted, cardioversion was successful.  He was taken then immediately over to CT scan.  He had a CT of his head which showed a left-sided MCA infarction.  Given his hypertension and tachycardia and him clutching on his abdomen I had concern for the possibility of aortic dissection, and given that the MCA infarction appeared to be completed I went ahead with a CTA of the chest abdomen pelvis, there is no obvious inciting cause of the patient's mentation change in the CTA of the chest abdomen and pelvis, so then a CTA of the head and neck was done after obtaining a point-of-care creatinine which was 1.4.  CTA of the head showed a large vessel occlusion in the left MCA.  Case was discussed with Dr. Franklin neurointerventional list who felt like intervention was the patient's best chance of a good recovery despite not knowing the last known well.    Amount and/or Complexity of Data Reviewed  Labs: ordered.  Radiology: ordered and independent interpretation performed.     Details: Noncontrast CT of the head shows a

## 2024-03-24 NOTE — BRIEF OP NOTE
Neuro-Interventional Surgery - Brief procedure note      Patient: Taj Clemens MRN: [id number]     YOB: 1960  Age: 63 y.o.  Sex: male      Service: Neuro-Interventional Surgery    Date of Procedure: 3/24/2024    Pre-Procedure Diagnosis: Acute stroke, LVO    Post-Procedure Diagnosis: SAME    Procedure(s): Mechanical thrombectomy: Left MCA    Vessels selected: Left common carotid artery, Left internal carotid artery, and Left Middle cerebral artery    Brief Description of Procedure: Significant tortuosity of the cervical and intracranial vasculature noted.  Occlusion of left middle cerebral artery and distal M1 segment identified.  Attempt at mechanical thrombectomy made using direct aspiration and stent retriever, however, the occluded left middle cerebral artery could not be successfully recanalized.  The distal left MCA vasculature demonstrates opacification via pial collaterals.    Initial TICI score: 1  Final TICI score: 1    Right common femoral arterial puncture site hemostasis achieved by deploying 8 F Angio-Seal closure device without complications.  No hematoma or oozing noted.  Distal R lower extremity pulses remain unchanged post hemostasis.  Sterile dressing applied over the puncture site.     Anesthesia:General    Estimated Blood Loss:  40 ml.    Specimens:  None    Implants:  None    Apparent Intraoperative Complications:  None immediate    Patient Condition:  Stable    Disposition:  Intensive care unit    Attestation:  I performed the procedure.        Signed By: Doroteo Franklin MD     March 24, 2024     [facility] NEUROINTERVENTIONAL SURGERY

## 2024-03-24 NOTE — CARE COORDINATION
Care Management Initial Assessment       RUR:11%  Readmission? No  1st IM letter given? No-Not given by Pt Registration  1st  letter given: No      03/24/24 1003   Service Assessment   Patient Orientation Sedated  (Pt is on a vent)   Cognition Other (see comment)  (Pt is on a vent.)   History Provided By Child/Family;Medical Record   Primary Caregiver Self   Support Systems Spouse/Significant Other   PCP Verified by CM No   Prior Functional Level Independent in ADLs/IADLs   Current Functional Level Other (see comment)  (TBD)   Can patient return to prior living arrangement Unknown at present   Ability to make needs known: Unable   Family able to assist with home care needs: Other (comment)  (TBD)   Would you like for me to discuss the discharge plan with any other family members/significant others, and if so, who? No   Financial Resources None     CM met with pt's wife, Romi and sister, Shameka, to introduce them to the role of CM and transition of care. This pt lives with his wife at home.Prior to admission, he was independent with all of his ADL's and IADL's. He did not use any assistive device for ambulation prior to admission.There is no history of him going to any kind of rehab or using home health services in the past. CM will follow for d/c needs. LEE Chavez, ACM-SW

## 2024-03-24 NOTE — PROGRESS NOTES
Date/Time Last Known Well Time: unknown    Date/Time Discovery of Stroke Symptoms: unknown    NIHSS upon arrival: 6    Time to IR Suite: 0245    Time of Arterial Puncture: 0301    Start of IA Infusion of tPA or  1st pass of recanalization device in   Target vessel: 0345    Time of Revascularization: 0356    Pretreatment TICI: 1    Post treatment TICI: 1    Procedure Stop Time(When sterile drape is off): 0400    Time of first set of VS, neuro cks, groin, and pulse checks: 0415    Time transfer to ICU: 0425    Time of last VS, neuro, groin, and pulse checks documentation before ICU caring for pt: 4031

## 2024-03-25 LAB
ANION GAP SERPL CALC-SCNC: 5 MMOL/L (ref 5–15)
BUN SERPL-MCNC: 12 MG/DL (ref 6–20)
BUN/CREAT SERPL: 9 (ref 12–20)
CALCIUM SERPL-MCNC: 8.7 MG/DL (ref 8.5–10.1)
CHLORIDE SERPL-SCNC: 112 MMOL/L (ref 97–108)
CO2 SERPL-SCNC: 23 MMOL/L (ref 21–32)
CREAT SERPL-MCNC: 1.4 MG/DL (ref 0.7–1.3)
ERYTHROCYTE [DISTWIDTH] IN BLOOD BY AUTOMATED COUNT: 13.9 % (ref 11.5–14.5)
GLUCOSE SERPL-MCNC: 139 MG/DL (ref 65–100)
HCT VFR BLD AUTO: 38.1 % (ref 36.6–50.3)
HGB BLD-MCNC: 12.5 G/DL (ref 12.1–17)
MAGNESIUM SERPL-MCNC: 1.9 MG/DL (ref 1.6–2.4)
MCH RBC QN AUTO: 30.4 PG (ref 26–34)
MCHC RBC AUTO-ENTMCNC: 32.8 G/DL (ref 30–36.5)
MCV RBC AUTO: 92.7 FL (ref 80–99)
NRBC # BLD: 0 K/UL (ref 0–0.01)
NRBC BLD-RTO: 0 PER 100 WBC
PHOSPHATE SERPL-MCNC: 3 MG/DL (ref 2.6–4.7)
PLATELET # BLD AUTO: 220 K/UL (ref 150–400)
PMV BLD AUTO: 10.2 FL (ref 8.9–12.9)
POTASSIUM SERPL-SCNC: 4.1 MMOL/L (ref 3.5–5.1)
RBC # BLD AUTO: 4.11 M/UL (ref 4.1–5.7)
SODIUM SERPL-SCNC: 140 MMOL/L (ref 136–145)
WBC # BLD AUTO: 8.1 K/UL (ref 4.1–11.1)

## 2024-03-25 PROCEDURE — 80048 BASIC METABOLIC PNL TOTAL CA: CPT

## 2024-03-25 PROCEDURE — 6370000000 HC RX 637 (ALT 250 FOR IP): Performed by: NURSE PRACTITIONER

## 2024-03-25 PROCEDURE — 36415 COLL VENOUS BLD VENIPUNCTURE: CPT

## 2024-03-25 PROCEDURE — APPNB45 APP NON BILLABLE 31-45 MINUTES

## 2024-03-25 PROCEDURE — 2580000003 HC RX 258: Performed by: NURSE PRACTITIONER

## 2024-03-25 PROCEDURE — 51798 US URINE CAPACITY MEASURE: CPT

## 2024-03-25 PROCEDURE — A4216 STERILE WATER/SALINE, 10 ML: HCPCS | Performed by: NURSE PRACTITIONER

## 2024-03-25 PROCEDURE — 99233 SBSQ HOSP IP/OBS HIGH 50: CPT | Performed by: PSYCHIATRY & NEUROLOGY

## 2024-03-25 PROCEDURE — 2500000003 HC RX 250 WO HCPCS: Performed by: NURSE PRACTITIONER

## 2024-03-25 PROCEDURE — 83735 ASSAY OF MAGNESIUM: CPT

## 2024-03-25 PROCEDURE — 94003 VENT MGMT INPAT SUBQ DAY: CPT

## 2024-03-25 PROCEDURE — 2000000000 HC ICU R&B

## 2024-03-25 PROCEDURE — 84439 ASSAY OF FREE THYROXINE: CPT

## 2024-03-25 PROCEDURE — 85027 COMPLETE CBC AUTOMATED: CPT

## 2024-03-25 PROCEDURE — 99223 1ST HOSP IP/OBS HIGH 75: CPT | Performed by: SPECIALIST

## 2024-03-25 PROCEDURE — 99233 SBSQ HOSP IP/OBS HIGH 50: CPT | Performed by: NURSE PRACTITIONER

## 2024-03-25 PROCEDURE — 6370000000 HC RX 637 (ALT 250 FOR IP): Performed by: PSYCHIATRY & NEUROLOGY

## 2024-03-25 PROCEDURE — 84100 ASSAY OF PHOSPHORUS: CPT

## 2024-03-25 PROCEDURE — 2580000003 HC RX 258

## 2024-03-25 RX ORDER — ASPIRIN 325 MG
325 TABLET ORAL DAILY
Status: DISCONTINUED | OUTPATIENT
Start: 2024-03-25 | End: 2024-03-25

## 2024-03-25 RX ORDER — ASPIRIN 81 MG/1
81 TABLET, CHEWABLE ORAL DAILY
Status: DISCONTINUED | OUTPATIENT
Start: 2024-03-26 | End: 2024-03-28 | Stop reason: HOSPADM

## 2024-03-25 RX ORDER — AMIODARONE HYDROCHLORIDE 200 MG/1
400 TABLET ORAL 2 TIMES DAILY
Status: DISCONTINUED | OUTPATIENT
Start: 2024-03-25 | End: 2024-03-26

## 2024-03-25 RX ADMIN — MIDODRINE HYDROCHLORIDE 5 MG: 5 TABLET ORAL at 16:10

## 2024-03-25 RX ADMIN — AMIODARONE HYDROCHLORIDE 400 MG: 200 TABLET ORAL at 21:03

## 2024-03-25 RX ADMIN — DEXMEDETOMIDINE HYDROCHLORIDE 0.5 MCG/KG/HR: 400 INJECTION, SOLUTION INTRAVENOUS at 04:58

## 2024-03-25 RX ADMIN — CHLORHEXIDINE GLUCONATE 15 ML: 1.2 RINSE ORAL at 07:50

## 2024-03-25 RX ADMIN — SODIUM CHLORIDE, PRESERVATIVE FREE 10 ML: 5 INJECTION INTRAVENOUS at 21:04

## 2024-03-25 RX ADMIN — ASPIRIN 325 MG: 325 TABLET ORAL at 08:25

## 2024-03-25 RX ADMIN — SODIUM CHLORIDE, POTASSIUM CHLORIDE, SODIUM LACTATE AND CALCIUM CHLORIDE 75 ML/HR: 600; 310; 30; 20 INJECTION, SOLUTION INTRAVENOUS at 05:51

## 2024-03-25 RX ADMIN — SODIUM CHLORIDE, PRESERVATIVE FREE 10 ML: 5 INJECTION INTRAVENOUS at 07:50

## 2024-03-25 RX ADMIN — MIDODRINE HYDROCHLORIDE 5 MG: 5 TABLET ORAL at 07:49

## 2024-03-25 RX ADMIN — SODIUM CHLORIDE, POTASSIUM CHLORIDE, SODIUM LACTATE AND CALCIUM CHLORIDE: 600; 310; 30; 20 INJECTION, SOLUTION INTRAVENOUS at 19:19

## 2024-03-25 RX ADMIN — AMIODARONE HYDROCHLORIDE 400 MG: 200 TABLET ORAL at 11:20

## 2024-03-25 RX ADMIN — MIDODRINE HYDROCHLORIDE 5 MG: 5 TABLET ORAL at 11:17

## 2024-03-25 RX ADMIN — DEXMEDETOMIDINE HYDROCHLORIDE 0.7 MCG/KG/HR: 400 INJECTION, SOLUTION INTRAVENOUS at 00:01

## 2024-03-25 RX ADMIN — FAMOTIDINE 20 MG: 10 INJECTION, SOLUTION INTRAVENOUS at 07:49

## 2024-03-25 RX ADMIN — ATORVASTATIN CALCIUM 40 MG: 40 TABLET, FILM COATED ORAL at 21:03

## 2024-03-25 ASSESSMENT — PULMONARY FUNCTION TESTS
PIF_VALUE: 16
PIF_VALUE: 18
PIF_VALUE: 11

## 2024-03-25 NOTE — CARE COORDINATION
Transition of Care Plan:    RUR: 16% Moderate   Prior Level of Functioning: Independent   Disposition: TBD  Follow up appointments: PCP, Neuro   DME needed: TBD  Transportation at discharge: Family vs BLS  IM/IMM Medicare/ letter given: No  Is patient a [place] and connected with VA? No  Caregiver Contact: Romi Ovalles  [phone]  Discharge Caregiver contacted prior to discharge? NO  Care Conference needed? No  Barriers to discharge:    Medical Stability   Remains intubated   Not following commands   Soco Vee RN,Care Management

## 2024-03-25 NOTE — CONSULTS
03/24/2024 01:13 AM    CO2 24 03/24/2024 01:13 AM    BUN 16 03/24/2024 01:13 AM    GFRAA 64 10/05/2021 12:00 AM     Lab Results   Component Value Date/Time    BNP 22 03/31/2021 04:12 PM       Imaging:  CT Results (maximum last 3):  CT Result (most recent):  CTA HEAD NECK W CONTRAST 03/24/2024 (Preliminary)    Left M2 occlusion with associated large left MCA territory perfusion defect.    Assessment:   Left MCA occlusion with large left MCA territory infarct    Plan:   - admit to ICU post endovascular intervention for further monitoring and stroke w/u   - Wean to extubate when appropriate  - SBP goal 140-180  - check TSH, Hgb A1C, lipid panel  - obtain ECHO to assess for any heart abnormality  - MRI of Brain WO to assess extent of ischemia  - PT/OT/SLP evals  - Neurology consult      The patient was not able to consent for the procedure. No family members are present with the patient. Attempts were made to contact family members via telephone, however, there were no family contacts available to provide consent. Given the emergent need for the procedure, we decided to proceed and perform the procedure as an emergency. Two physician written consent obtained.     I have discussed the diagnosis and the intended plan as seen in the above orders with Dr. Franklin, Intensivist NP, ICU Charge RN.    OSMIN Rankin CNP have spent 30 of critical care time involved in lab review, consultations with specialist, family decision making and documentation. During this entire length of time I was immediately available to the patient.      Thank you for this consult and participating in the care of this patient.  Signed By: OSMIN Rankin CNP     March 24, 2024       
aflutter RVR on admission. Currently NSR with brief burst of ?PAT vs PAFL. . Has history of PAF in past.  Unable to start BB due to soft BP requiring midodrine to maintain -180 for neuro purposes.  Will start amiodarone 400 mg via NG tube BID. [id number]= 5 (age, CVA, CM, HTN). Unable to anticoagulate for at least 1 week per neuro due to risk of hemorrhagic conversion.  TSH 4.88, check free T4.     Cardiomyopathy:  New diagnosis. EF 20-25%. No pulmonary edema on CTA chest. May be due to aflutter RVR. Troponins normal x 2 but may need eventual ischemia evaluation.  Repeat limited echo Wed or Thursday of this week.   GDMT limited due to BP requiring midodrine to maintain SBP >120.      Abdominal aorta distal thrombus: no dissection or aneurym. Appears atherogenic per CTA abdomen.  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  Unable to anticoagulate as due to concern for hemorrhagic conversion.      Acute ischemic CVA: Left MCA occlusion. Cardio-embolic suspsected. S/p attempted thrombectomy. On ASA, Atorvastatin 40 mg q HS. SBP goal 140-180 per neuro.    HTN history: per chart. Not on BP meds PTA.   Elevated creatinine:  creatinine trending down (1.4 today) from 1.65 yesterday           ____________________________________________________________      Cardiac testing  03/24/24    ECHO (TTE) LIMITED (PRN CONTRAST/BUBBLE/STRAIN/3D) 03/24/2024 11:49 AM (Final)    Interpretation Summary    Left Ventricle: Severely reduced left ventricular systolic function with a visually estimated EF of 25 - 30%. Not well visualized. Left ventricle size is normal. Normal wall thickness. Global hypokinesis present.    Right Ventricle: Not well visualized. Reduced systolic function. TAPSE is abnormal. TAPSE is 0.6 cm.    Mitral Valve: Mild regurgitation.    Pulmonary Arteries: Moderate pulmonary hypertension present.    Image quality is technically difficult. Technically difficult 
territory mismatched hypoperfusion.  RCBF < 30% = 4 cc.  Tmax > 6 seconds = 167 cc.  Mismatch volume = 163 cc.    Impression  Acute occlusion left M1-M2 middle cerebral artery at the bifurcation with  associated large territory hypoperfusion, penumbra 167 cc.    Assessment and Plan:   Pt is a 64yo RH male with h/o Afib, HTN, on ASA 81mg daily at home, admitted 3/24/24 on transfer from [facility] after he presented with AMS/aphasia found to be in aflutter with RVR and was cardioverted in ED. /99. CTH with left MCA hypoattenuation concerning for acute or subacute stroke. CTA H/N with left MCA occlusion.  CTP with large left MCA defect. NIHSS score 6 on arrival to [facility]. S/p left MCA thrombectomy attempt, could not be recanalized. CTA C/A with eccentric probable atherosclerotic thrombus of distal aorta on left without occlusion or dissection. 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 TTE with EF 25-30%, global hypokinesis. LDL 59.6, started on Lipitor 80mg.  HgbA1C 5.7.  Started on ASA 300mg MT.     Most likely cardioembolic stroke due to Afib and low EF  -Discussed stroke, deficits of language and right sided weakness, and probable etiology with sister and friend. Discussed guarded prognosis over the next 3 days due to risk of ongoing edema.   -Continue q1 hour neuro checks  -MRI brain pending  -Decreased lipitor to 40mg daily with LDL well below goal<70  -Treat SBP>180  -Treatment of prediabetes per primary team  -Continue ASA 300mg MT  -Hold off on anticoagulation if possible, ideally would wait at least a week.  -PT/OT/ST/Rehab when able    Signed:Luz Elena Huynh MD

## 2024-03-26 ENCOUNTER — APPOINTMENT (OUTPATIENT)
Facility: HOSPITAL | Age: 64
DRG: 064 | End: 2024-03-26
Payer: MEDICARE

## 2024-03-26 LAB
ANION GAP SERPL CALC-SCNC: 2 MMOL/L (ref 5–15)
APPEARANCE UR: CLEAR
BACTERIA URNS QL MICRO: NEGATIVE /HPF
BILIRUB UR QL: NEGATIVE
BUN SERPL-MCNC: 12 MG/DL (ref 6–20)
BUN/CREAT SERPL: 9 (ref 12–20)
CALCIUM SERPL-MCNC: 8.8 MG/DL (ref 8.5–10.1)
CHLORIDE SERPL-SCNC: 112 MMOL/L (ref 97–108)
CO2 SERPL-SCNC: 24 MMOL/L (ref 21–32)
COLOR UR: ABNORMAL
COMMENT:: NORMAL
CREAT SERPL-MCNC: 1.34 MG/DL (ref 0.7–1.3)
EPITH CASTS URNS QL MICRO: ABNORMAL /LPF
ERYTHROCYTE [DISTWIDTH] IN BLOOD BY AUTOMATED COUNT: 14 % (ref 11.5–14.5)
GLUCOSE SERPL-MCNC: 100 MG/DL (ref 65–100)
GLUCOSE UR STRIP.AUTO-MCNC: NEGATIVE MG/DL
HCT VFR BLD AUTO: 38.5 % (ref 36.6–50.3)
HGB BLD-MCNC: 12.4 G/DL (ref 12.1–17)
HGB UR QL STRIP: ABNORMAL
HYALINE CASTS URNS QL MICRO: ABNORMAL /LPF (ref 0–5)
KETONES UR QL STRIP.AUTO: 15 MG/DL
LACTATE SERPL-SCNC: 0.9 MMOL/L (ref 0.4–2)
LACTATE SERPL-SCNC: 2.7 MMOL/L (ref 0.4–2)
LEUKOCYTE ESTERASE UR QL STRIP.AUTO: NEGATIVE
MCH RBC QN AUTO: 30.8 PG (ref 26–34)
MCHC RBC AUTO-ENTMCNC: 32.2 G/DL (ref 30–36.5)
MCV RBC AUTO: 95.5 FL (ref 80–99)
NITRITE UR QL STRIP.AUTO: NEGATIVE
NRBC # BLD: 0 K/UL (ref 0–0.01)
NRBC BLD-RTO: 0 PER 100 WBC
PH UR STRIP: 5.5 (ref 5–8)
PLATELET # BLD AUTO: 189 K/UL (ref 150–400)
PMV BLD AUTO: 10.5 FL (ref 8.9–12.9)
POTASSIUM SERPL-SCNC: 3.7 MMOL/L (ref 3.5–5.1)
PROCALCITONIN SERPL-MCNC: 0.16 NG/ML
PROT UR STRIP-MCNC: 30 MG/DL
RBC # BLD AUTO: 4.03 M/UL (ref 4.1–5.7)
RBC #/AREA URNS HPF: ABNORMAL /HPF (ref 0–5)
SODIUM SERPL-SCNC: 138 MMOL/L (ref 136–145)
SP GR UR REFRACTOMETRY: 1.03 (ref 1–1.03)
SPECIMEN HOLD: NORMAL
T4 FREE SERPL-MCNC: 1.38 NG/DL (ref 0.82–1.77)
URINE CULTURE IF INDICATED: ABNORMAL
UROBILINOGEN UR QL STRIP.AUTO: 1 EU/DL (ref 0.2–1)
WBC # BLD AUTO: 8.6 K/UL (ref 4.1–11.1)
WBC URNS QL MICRO: ABNORMAL /HPF (ref 0–4)

## 2024-03-26 PROCEDURE — 85027 COMPLETE CBC AUTOMATED: CPT

## 2024-03-26 PROCEDURE — 92523 SPEECH SOUND LANG COMPREHEN: CPT

## 2024-03-26 PROCEDURE — 2580000003 HC RX 258: Performed by: NURSE PRACTITIONER

## 2024-03-26 PROCEDURE — 97530 THERAPEUTIC ACTIVITIES: CPT

## 2024-03-26 PROCEDURE — 87040 BLOOD CULTURE FOR BACTERIA: CPT

## 2024-03-26 PROCEDURE — 6370000000 HC RX 637 (ALT 250 FOR IP): Performed by: NURSE PRACTITIONER

## 2024-03-26 PROCEDURE — 99233 SBSQ HOSP IP/OBS HIGH 50: CPT | Performed by: SPECIALIST

## 2024-03-26 PROCEDURE — 36415 COLL VENOUS BLD VENIPUNCTURE: CPT

## 2024-03-26 PROCEDURE — 84145 PROCALCITONIN (PCT): CPT

## 2024-03-26 PROCEDURE — APPNB45 APP NON BILLABLE 31-45 MINUTES

## 2024-03-26 PROCEDURE — 99232 SBSQ HOSP IP/OBS MODERATE 35: CPT | Performed by: PSYCHIATRY & NEUROLOGY

## 2024-03-26 PROCEDURE — 2700000000 HC OXYGEN THERAPY PER DAY

## 2024-03-26 PROCEDURE — 83605 ASSAY OF LACTIC ACID: CPT

## 2024-03-26 PROCEDURE — 2060000000 HC ICU INTERMEDIATE R&B

## 2024-03-26 PROCEDURE — 6370000000 HC RX 637 (ALT 250 FOR IP): Performed by: PSYCHIATRY & NEUROLOGY

## 2024-03-26 PROCEDURE — 51701 INSERT BLADDER CATHETER: CPT

## 2024-03-26 PROCEDURE — 6370000000 HC RX 637 (ALT 250 FOR IP): Performed by: SPECIALIST

## 2024-03-26 PROCEDURE — 6370000000 HC RX 637 (ALT 250 FOR IP): Performed by: INTERNAL MEDICINE

## 2024-03-26 PROCEDURE — 2580000003 HC RX 258

## 2024-03-26 PROCEDURE — 80048 BASIC METABOLIC PNL TOTAL CA: CPT

## 2024-03-26 PROCEDURE — 97165 OT EVAL LOW COMPLEX 30 MIN: CPT

## 2024-03-26 PROCEDURE — 94760 N-INVAS EAR/PLS OXIMETRY 1: CPT

## 2024-03-26 PROCEDURE — 92610 EVALUATE SWALLOWING FUNCTION: CPT

## 2024-03-26 PROCEDURE — 71045 X-RAY EXAM CHEST 1 VIEW: CPT

## 2024-03-26 PROCEDURE — 81001 URINALYSIS AUTO W/SCOPE: CPT

## 2024-03-26 PROCEDURE — 97161 PT EVAL LOW COMPLEX 20 MIN: CPT

## 2024-03-26 RX ORDER — AMIODARONE HYDROCHLORIDE 200 MG/1
200 TABLET ORAL 2 TIMES DAILY
Status: DISCONTINUED | OUTPATIENT
Start: 2024-03-26 | End: 2024-03-28 | Stop reason: HOSPADM

## 2024-03-26 RX ORDER — ACETAMINOPHEN 650 MG/1
650 SUPPOSITORY RECTAL EVERY 4 HOURS PRN
Status: DISCONTINUED | OUTPATIENT
Start: 2024-03-26 | End: 2024-03-28 | Stop reason: HOSPADM

## 2024-03-26 RX ORDER — SPIRONOLACTONE 25 MG/1
25 TABLET ORAL DAILY
Status: DISCONTINUED | OUTPATIENT
Start: 2024-03-26 | End: 2024-03-28 | Stop reason: HOSPADM

## 2024-03-26 RX ORDER — ACETAMINOPHEN 325 MG/1
650 TABLET ORAL EVERY 4 HOURS PRN
Status: DISCONTINUED | OUTPATIENT
Start: 2024-03-26 | End: 2024-03-28 | Stop reason: HOSPADM

## 2024-03-26 RX ADMIN — MIDODRINE HYDROCHLORIDE 5 MG: 5 TABLET ORAL at 07:51

## 2024-03-26 RX ADMIN — SODIUM CHLORIDE, PRESERVATIVE FREE 10 ML: 5 INJECTION INTRAVENOUS at 22:38

## 2024-03-26 RX ADMIN — SODIUM CHLORIDE, POTASSIUM CHLORIDE, SODIUM LACTATE AND CALCIUM CHLORIDE: 600; 310; 30; 20 INJECTION, SOLUTION INTRAVENOUS at 07:56

## 2024-03-26 RX ADMIN — SPIRONOLACTONE 25 MG: 25 TABLET ORAL at 22:36

## 2024-03-26 RX ADMIN — AMIODARONE HYDROCHLORIDE 400 MG: 200 TABLET ORAL at 07:51

## 2024-03-26 RX ADMIN — ACETAMINOPHEN 650 MG: 325 TABLET ORAL at 19:28

## 2024-03-26 RX ADMIN — SODIUM CHLORIDE, PRESERVATIVE FREE 10 ML: 5 INJECTION INTRAVENOUS at 07:51

## 2024-03-26 RX ADMIN — SACUBITRIL AND VALSARTAN 1 TABLET: 24; 26 TABLET, FILM COATED ORAL at 22:36

## 2024-03-26 RX ADMIN — ATORVASTATIN CALCIUM 40 MG: 40 TABLET, FILM COATED ORAL at 22:36

## 2024-03-26 RX ADMIN — AMIODARONE HYDROCHLORIDE 200 MG: 200 TABLET ORAL at 22:36

## 2024-03-26 RX ADMIN — ASPIRIN 81 MG: 81 TABLET, CHEWABLE ORAL at 07:51

## 2024-03-26 NOTE — H&P
History and Physical    Date of Service:  3/26/2024  Primary Care Provider: No primary care provider on file.  Source of information: Chart review    Chief Complaint: Speech impairment       History of Presenting Illness:   Taj Clemens is a 63 y.o. male with a history of atrial fibrillation, HTN, and obesity who presented to [facility] ED after sister noticed confusion and garbled speech while on the telephone.  After ED physician evaluation patient with confusion and unable to speak coherently.  Per chart review and report, EKG was done that showed A-fib with RVR rate in the 200's.  Decision was made to sedate with etomidate and cardiovert with synchronized cardioversion, due to concern for lack of cerebral perfusion in setting of tachycardia and hypertension.  Cardioversion was successful per chart review.  Patient was then taken to CT department for scans.  CT head showed a left-sided MCA infarct.  ED physician had concern for aortic dissection thus a CTA of the chest abdomen and pelvis was done.  No dissection on report.  CTA of the head and neck was done after 4 and she had a large vessel occlusion in the left MCA.  Neurointerventional radiology was consulted and evaluated case and imaging and recommended patient be transferred over to [facility] for possible uncal for thrombectomy.  Patient was transferred ED to ED and then went to angio for procedure.  After arrival to [facility] patient was agitated combative requiring multiple staff to keep in bed.  General anesthesia was used for procedure and patient was intubated.  Did require some Rafael-Synephrine to keep blood pressures up.  Was unable to recannulized occluded vessel. Patient was was brought to ICU intubated placed on mechanical ventilation on 3/24.      Patient extubated, off pressors and precedex. Transferred out of ICU on 3/26.     Patient is seen and examined at bedside in ICU. Girlfriend present. He is aphasic and tired. Did not follow 
    Intake/Output:     Intake/Output Summary (Last 24 hours) at 3/24/2024 0609  Last data filed at 3/24/2024 0540  Gross per 24 hour   Intake 500 ml   Output 400 ml   Net 100 ml       Imaging:    CT head without contrast 3/24/2024  IMPRESSION:  Acute to subacute left temporoparietal MCA territory infarct is  suspected.     CTA head and neck with contrast and brain perfusion 3/24/2024  *PRELIMINARY REPORT*  Left M2 occlusion with associated large left MCA territory perfusion defect.    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 No other  vascular abnormality or acute abnormality otherwise      Echo:  No valid procedures specified.     CRITICAL CARE DOCUMENTATION  I had a face to face encounter with the patient, reviewed and interpreted patient data including clinical events, labs, images, vital signs, I/O's, and examined patient.  I have discussed the case and the plan and management of the patient's care with the consulting services, the bedside nurses and the respiratory therapist.      NOTE OF PERSONAL INVOLVEMENT IN CARE   This patient has a high probability of imminent, clinically significant deterioration, which requires the highest level of preparedness to intervene urgently. I participated in the decision-making and personally managed or directed the management of the following life and organ supporting interventions that required my frequent assessment to treat or prevent imminent deterioration.    I personally spent 50 minutes of critical care time.  This is time spent at this critically ill patient's bedside actively involved in patient care as well as the coordination of care.  This does not include any procedural time which has been billed separately.    Jaun Cadena,

## 2024-03-27 ENCOUNTER — APPOINTMENT (OUTPATIENT)
Facility: HOSPITAL | Age: 64
DRG: 064 | End: 2024-03-27
Attending: INTERNAL MEDICINE
Payer: MEDICARE

## 2024-03-27 LAB
ANION GAP SERPL CALC-SCNC: 7 MMOL/L (ref 5–15)
ANION GAP SERPL CALC-SCNC: 9 MMOL/L (ref 5–15)
B PERT DNA SPEC QL NAA+PROBE: NOT DETECTED
BORDETELLA PARAPERTUSSIS BY PCR: NOT DETECTED
BUN SERPL-MCNC: 8 MG/DL (ref 6–20)
BUN SERPL-MCNC: 9 MG/DL (ref 6–20)
BUN/CREAT SERPL: 11 (ref 12–20)
BUN/CREAT SERPL: 9 (ref 12–20)
C PNEUM DNA SPEC QL NAA+PROBE: NOT DETECTED
CALCIUM SERPL-MCNC: 7 MG/DL (ref 8.5–10.1)
CALCIUM SERPL-MCNC: 8.8 MG/DL (ref 8.5–10.1)
CHLORIDE SERPL-SCNC: 107 MMOL/L (ref 97–108)
CHLORIDE SERPL-SCNC: 115 MMOL/L (ref 97–108)
CO2 SERPL-SCNC: 20 MMOL/L (ref 21–32)
CO2 SERPL-SCNC: 22 MMOL/L (ref 21–32)
CREAT SERPL-MCNC: 0.73 MG/DL (ref 0.7–1.3)
CREAT SERPL-MCNC: 0.96 MG/DL (ref 0.7–1.3)
ECHO BSA: 2.51 M2
ERYTHROCYTE [DISTWIDTH] IN BLOOD BY AUTOMATED COUNT: 13.5 % (ref 11.5–14.5)
FLUAV SUBTYP SPEC NAA+PROBE: NOT DETECTED
FLUBV RNA SPEC QL NAA+PROBE: NOT DETECTED
GLUCOSE SERPL-MCNC: 100 MG/DL (ref 65–100)
GLUCOSE SERPL-MCNC: 74 MG/DL (ref 65–100)
HADV DNA SPEC QL NAA+PROBE: NOT DETECTED
HCOV 229E RNA SPEC QL NAA+PROBE: NOT DETECTED
HCOV HKU1 RNA SPEC QL NAA+PROBE: NOT DETECTED
HCOV NL63 RNA SPEC QL NAA+PROBE: NOT DETECTED
HCOV OC43 RNA SPEC QL NAA+PROBE: NOT DETECTED
HCT VFR BLD AUTO: 39 % (ref 36.6–50.3)
HGB BLD-MCNC: 12.6 G/DL (ref 12.1–17)
HMPV RNA SPEC QL NAA+PROBE: NOT DETECTED
HPIV1 RNA SPEC QL NAA+PROBE: NOT DETECTED
HPIV2 RNA SPEC QL NAA+PROBE: NOT DETECTED
HPIV3 RNA SPEC QL NAA+PROBE: NOT DETECTED
HPIV4 RNA SPEC QL NAA+PROBE: NOT DETECTED
M PNEUMO DNA SPEC QL NAA+PROBE: NOT DETECTED
MCH RBC QN AUTO: 30.2 PG (ref 26–34)
MCHC RBC AUTO-ENTMCNC: 32.3 G/DL (ref 30–36.5)
MCV RBC AUTO: 93.5 FL (ref 80–99)
NRBC # BLD: 0 K/UL (ref 0–0.01)
NRBC BLD-RTO: 0 PER 100 WBC
PLATELET # BLD AUTO: 163 K/UL (ref 150–400)
PMV BLD AUTO: 11.1 FL (ref 8.9–12.9)
POTASSIUM SERPL-SCNC: 3.8 MMOL/L (ref 3.5–5.1)
POTASSIUM SERPL-SCNC: 3.8 MMOL/L (ref 3.5–5.1)
RBC # BLD AUTO: 4.17 M/UL (ref 4.1–5.7)
RSV RNA SPEC QL NAA+PROBE: NOT DETECTED
RV+EV RNA SPEC QL NAA+PROBE: NOT DETECTED
SARS-COV-2 RNA RESP QL NAA+PROBE: NOT DETECTED
SODIUM SERPL-SCNC: 138 MMOL/L (ref 136–145)
SODIUM SERPL-SCNC: 142 MMOL/L (ref 136–145)
WBC # BLD AUTO: 8.5 K/UL (ref 4.1–11.1)

## 2024-03-27 PROCEDURE — 80048 BASIC METABOLIC PNL TOTAL CA: CPT

## 2024-03-27 PROCEDURE — 6370000000 HC RX 637 (ALT 250 FOR IP): Performed by: SPECIALIST

## 2024-03-27 PROCEDURE — 6370000000 HC RX 637 (ALT 250 FOR IP): Performed by: NURSE PRACTITIONER

## 2024-03-27 PROCEDURE — 6370000000 HC RX 637 (ALT 250 FOR IP): Performed by: PSYCHIATRY & NEUROLOGY

## 2024-03-27 PROCEDURE — 36415 COLL VENOUS BLD VENIPUNCTURE: CPT

## 2024-03-27 PROCEDURE — 2060000000 HC ICU INTERMEDIATE R&B

## 2024-03-27 PROCEDURE — 97530 THERAPEUTIC ACTIVITIES: CPT

## 2024-03-27 PROCEDURE — 99233 SBSQ HOSP IP/OBS HIGH 50: CPT | Performed by: SPECIALIST

## 2024-03-27 PROCEDURE — 97112 NEUROMUSCULAR REEDUCATION: CPT

## 2024-03-27 PROCEDURE — 0202U NFCT DS 22 TRGT SARS-COV-2: CPT

## 2024-03-27 PROCEDURE — 6370000000 HC RX 637 (ALT 250 FOR IP): Performed by: INTERNAL MEDICINE

## 2024-03-27 PROCEDURE — 2580000003 HC RX 258

## 2024-03-27 PROCEDURE — 85027 COMPLETE CBC AUTOMATED: CPT

## 2024-03-27 PROCEDURE — 92526 ORAL FUNCTION THERAPY: CPT

## 2024-03-27 PROCEDURE — 2580000003 HC RX 258: Performed by: NURSE PRACTITIONER

## 2024-03-27 PROCEDURE — 97535 SELF CARE MNGMENT TRAINING: CPT

## 2024-03-27 PROCEDURE — 92507 TX SP LANG VOICE COMM INDIV: CPT

## 2024-03-27 PROCEDURE — 93970 EXTREMITY STUDY: CPT

## 2024-03-27 RX ADMIN — ATORVASTATIN CALCIUM 40 MG: 40 TABLET, FILM COATED ORAL at 21:51

## 2024-03-27 RX ADMIN — ACETAMINOPHEN 650 MG: 325 TABLET ORAL at 22:10

## 2024-03-27 RX ADMIN — SPIRONOLACTONE 25 MG: 25 TABLET ORAL at 09:42

## 2024-03-27 RX ADMIN — SACUBITRIL AND VALSARTAN 1 TABLET: 49; 51 TABLET, FILM COATED ORAL at 19:25

## 2024-03-27 RX ADMIN — SACUBITRIL AND VALSARTAN 1 TABLET: 24; 26 TABLET, FILM COATED ORAL at 09:42

## 2024-03-27 RX ADMIN — SODIUM CHLORIDE, PRESERVATIVE FREE 10 ML: 5 INJECTION INTRAVENOUS at 09:40

## 2024-03-27 RX ADMIN — SODIUM CHLORIDE, PRESERVATIVE FREE 10 ML: 5 INJECTION INTRAVENOUS at 21:52

## 2024-03-27 RX ADMIN — SODIUM CHLORIDE, PRESERVATIVE FREE 10 ML: 5 INJECTION INTRAVENOUS at 21:51

## 2024-03-27 RX ADMIN — ACETAMINOPHEN 650 MG: 325 TABLET ORAL at 09:44

## 2024-03-27 RX ADMIN — AMIODARONE HYDROCHLORIDE 200 MG: 200 TABLET ORAL at 21:51

## 2024-03-27 RX ADMIN — AMIODARONE HYDROCHLORIDE 200 MG: 200 TABLET ORAL at 09:42

## 2024-03-27 RX ADMIN — ASPIRIN 81 MG: 81 TABLET, CHEWABLE ORAL at 09:42

## 2024-03-27 NOTE — CARE COORDINATION
Transition of Care Plan:     RUR: 16% Moderate   Prior Level of Functioning: Independent   Disposition: IPR-Referrals sent to [facility] and [facility]  Follow up appointments: PCP, Neuro   DME needed: TBD  Transportation at discharge: Family vs BLS  IM/IMM Medicare/ letter given: No  Is patient a  and connected with VA? No  Caregiver Contact: Romi Ovalles  [phone]  Discharge Caregiver contacted prior to discharge? NO  Care Conference needed? No  Barriers to discharge:    Medical Stability     Belkis Sharma, RN/CRM  [phone]

## 2024-03-28 ENCOUNTER — TELEPHONE (OUTPATIENT)
Age: 64
End: 2024-03-28

## 2024-03-28 ENCOUNTER — APPOINTMENT (OUTPATIENT)
Facility: HOSPITAL | Age: 64
DRG: 064 | End: 2024-03-28
Payer: MEDICARE

## 2024-03-28 VITALS
WEIGHT: 281.75 LBS | SYSTOLIC BLOOD PRESSURE: 162 MMHG | BODY MASS INDEX: 38.16 KG/M2 | RESPIRATION RATE: 29 BRPM | HEIGHT: 72 IN | TEMPERATURE: 98.1 F | HEART RATE: 91 BPM | DIASTOLIC BLOOD PRESSURE: 113 MMHG | OXYGEN SATURATION: 94 %

## 2024-03-28 DIAGNOSIS — R94.30 CARDIAC LV EJECTION FRACTION 21-30%: Primary | ICD-10-CM

## 2024-03-28 DIAGNOSIS — I48.91 ATRIAL FIBRILLATION (HCC): ICD-10-CM

## 2024-03-28 LAB
ANION GAP SERPL CALC-SCNC: 8 MMOL/L (ref 5–15)
BUN SERPL-MCNC: 11 MG/DL (ref 6–20)
BUN/CREAT SERPL: 12 (ref 12–20)
CALCIUM SERPL-MCNC: 8.4 MG/DL (ref 8.5–10.1)
CHLORIDE SERPL-SCNC: 107 MMOL/L (ref 97–108)
CO2 SERPL-SCNC: 21 MMOL/L (ref 21–32)
CREAT SERPL-MCNC: 0.92 MG/DL (ref 0.7–1.3)
ECHO AO ROOT DIAM: 3.9 CM
ECHO AO ROOT INDEX: 1.59 CM/M2
ECHO AV AREA PEAK VELOCITY: 4.4 CM2
ECHO AV AREA/BSA PEAK VELOCITY: 1.8 CM2/M2
ECHO AV PEAK GRADIENT: 7 MMHG
ECHO AV PEAK VELOCITY: 1.3 M/S
ECHO AV VELOCITY RATIO: 0.77
ECHO BSA: 2.55 M2
ECHO EST RA PRESSURE: 3 MMHG
ECHO LA DIAMETER INDEX: 1.59 CM/M2
ECHO LA DIAMETER: 3.9 CM
ECHO LA TO AORTIC ROOT RATIO: 1
ECHO LV EF PHYSICIAN: 40 %
ECHO LV FRACTIONAL SHORTENING: 25 % (ref 28–44)
ECHO LV INTERNAL DIMENSION DIASTOLE INDEX: 1.95 CM/M2
ECHO LV INTERNAL DIMENSION DIASTOLIC MMODE: 4.5 CM (ref 4.2–5.9)
ECHO LV INTERNAL DIMENSION DIASTOLIC: 4.8 CM (ref 4.2–5.9)
ECHO LV INTERNAL DIMENSION SYSTOLIC INDEX: 1.46 CM/M2
ECHO LV INTERNAL DIMENSION SYSTOLIC: 3.6 CM
ECHO LV IVSD MMODE: 1 CM (ref 0.6–1)
ECHO LV IVSD: 1.4 CM (ref 0.6–1)
ECHO LV IVSS MMODE: 1.1 CM
ECHO LV MASS 2D: 273.8 G (ref 88–224)
ECHO LV MASS INDEX 2D: 111.3 G/M2 (ref 49–115)
ECHO LV POSTERIOR WALL DIASTOLIC MMODE: 1.3 CM (ref 0.6–1)
ECHO LV POSTERIOR WALL DIASTOLIC: 1.4 CM (ref 0.6–1)
ECHO LV RELATIVE WALL THICKNESS RATIO: 0.58
ECHO LVOT AREA: 5.3 CM2
ECHO LVOT DIAM: 2.6 CM
ECHO LVOT PEAK GRADIENT: 4 MMHG
ECHO LVOT PEAK VELOCITY: 1 M/S
ECHO MV A VELOCITY: 0.74 M/S
ECHO MV AREA PHT: 4.1 CM2
ECHO MV E DECELERATION TIME (DT): 186 MS
ECHO MV E VELOCITY: 0.34 M/S
ECHO MV E/A RATIO: 0.46
ECHO MV PRESSURE HALF TIME (PHT): 53.9 MS
ECHO MV REGURGITANT PEAK GRADIENT: 36 MMHG
ECHO MV REGURGITANT PEAK VELOCITY: 3 M/S
ECHO RIGHT VENTRICULAR SYSTOLIC PRESSURE (RVSP): 28 MMHG
ECHO RV TAPSE: 1.6 CM (ref 1.7–?)
ECHO TV REGURGITANT MAX VELOCITY: 2.52 M/S
ECHO TV REGURGITANT PEAK GRADIENT: 25 MMHG
ERYTHROCYTE [DISTWIDTH] IN BLOOD BY AUTOMATED COUNT: 13.6 % (ref 11.5–14.5)
GLUCOSE SERPL-MCNC: 103 MG/DL (ref 65–100)
HCT VFR BLD AUTO: 38.3 % (ref 36.6–50.3)
HGB BLD-MCNC: 13.1 G/DL (ref 12.1–17)
MCH RBC QN AUTO: 31 PG (ref 26–34)
MCHC RBC AUTO-ENTMCNC: 34.2 G/DL (ref 30–36.5)
MCV RBC AUTO: 90.5 FL (ref 80–99)
NRBC # BLD: 0 K/UL (ref 0–0.01)
NRBC BLD-RTO: 0 PER 100 WBC
PLATELET # BLD AUTO: 164 K/UL (ref 150–400)
PMV BLD AUTO: 11.3 FL (ref 8.9–12.9)
POTASSIUM SERPL-SCNC: 3.5 MMOL/L (ref 3.5–5.1)
RBC # BLD AUTO: 4.23 M/UL (ref 4.1–5.7)
SODIUM SERPL-SCNC: 136 MMOL/L (ref 136–145)
WBC # BLD AUTO: 8.5 K/UL (ref 4.1–11.1)

## 2024-03-28 PROCEDURE — 99233 SBSQ HOSP IP/OBS HIGH 50: CPT | Performed by: SPECIALIST

## 2024-03-28 PROCEDURE — 2580000003 HC RX 258: Performed by: NURSE PRACTITIONER

## 2024-03-28 PROCEDURE — 92526 ORAL FUNCTION THERAPY: CPT

## 2024-03-28 PROCEDURE — 92507 TX SP LANG VOICE COMM INDIV: CPT

## 2024-03-28 PROCEDURE — 2580000003 HC RX 258

## 2024-03-28 PROCEDURE — C8924 2D TTE W OR W/O FOL W/CON,FU: HCPCS

## 2024-03-28 PROCEDURE — 97112 NEUROMUSCULAR REEDUCATION: CPT

## 2024-03-28 PROCEDURE — 6370000000 HC RX 637 (ALT 250 FOR IP): Performed by: SPECIALIST

## 2024-03-28 PROCEDURE — 97530 THERAPEUTIC ACTIVITIES: CPT

## 2024-03-28 PROCEDURE — 6370000000 HC RX 637 (ALT 250 FOR IP): Performed by: NURSE PRACTITIONER

## 2024-03-28 PROCEDURE — 36415 COLL VENOUS BLD VENIPUNCTURE: CPT

## 2024-03-28 PROCEDURE — 6360000004 HC RX CONTRAST MEDICATION: Performed by: HOSPITALIST

## 2024-03-28 PROCEDURE — 85027 COMPLETE CBC AUTOMATED: CPT

## 2024-03-28 PROCEDURE — 6370000000 HC RX 637 (ALT 250 FOR IP): Performed by: HOSPITALIST

## 2024-03-28 PROCEDURE — 80048 BASIC METABOLIC PNL TOTAL CA: CPT

## 2024-03-28 RX ORDER — ATORVASTATIN CALCIUM 40 MG/1
40 TABLET, FILM COATED ORAL NIGHTLY
Qty: 30 TABLET | Refills: 3 | Status: SHIPPED | OUTPATIENT
Start: 2024-03-28

## 2024-03-28 RX ORDER — CARVEDILOL 6.25 MG/1
6.25 TABLET ORAL 2 TIMES DAILY WITH MEALS
Qty: 60 TABLET | Refills: 3 | Status: SHIPPED | OUTPATIENT
Start: 2024-03-28

## 2024-03-28 RX ORDER — AMIODARONE HYDROCHLORIDE 200 MG/1
200 TABLET ORAL 2 TIMES DAILY
Qty: 60 TABLET | Refills: 0 | Status: SHIPPED | OUTPATIENT
Start: 2024-03-28 | End: 2024-04-27

## 2024-03-28 RX ORDER — ERGOCALCIFEROL 1.25 MG/1
50000 CAPSULE ORAL WEEKLY
Status: DISCONTINUED | OUTPATIENT
Start: 2024-03-28 | End: 2024-03-28 | Stop reason: HOSPADM

## 2024-03-28 RX ORDER — CARVEDILOL 12.5 MG/1
6.25 TABLET ORAL 2 TIMES DAILY WITH MEALS
Status: DISCONTINUED | OUTPATIENT
Start: 2024-03-28 | End: 2024-03-28 | Stop reason: HOSPADM

## 2024-03-28 RX ORDER — SPIRONOLACTONE 25 MG/1
25 TABLET ORAL DAILY
Qty: 30 TABLET | Refills: 3 | Status: SHIPPED | OUTPATIENT
Start: 2024-03-29

## 2024-03-28 RX ADMIN — SACUBITRIL AND VALSARTAN 1 TABLET: 49; 51 TABLET, FILM COATED ORAL at 09:46

## 2024-03-28 RX ADMIN — PERFLUTREN 1.5 ML: 6.52 INJECTION, SUSPENSION INTRAVENOUS at 12:44

## 2024-03-28 RX ADMIN — SPIRONOLACTONE 25 MG: 25 TABLET ORAL at 09:46

## 2024-03-28 RX ADMIN — CARVEDILOL 6.25 MG: 12.5 TABLET, FILM COATED ORAL at 16:52

## 2024-03-28 RX ADMIN — ASPIRIN 81 MG: 81 TABLET, CHEWABLE ORAL at 09:47

## 2024-03-28 RX ADMIN — SODIUM CHLORIDE, PRESERVATIVE FREE 10 ML: 5 INJECTION INTRAVENOUS at 09:46

## 2024-03-28 RX ADMIN — ERGOCALCIFEROL 50000 UNITS: 1.25 CAPSULE ORAL at 14:57

## 2024-03-28 RX ADMIN — AMIODARONE HYDROCHLORIDE 200 MG: 200 TABLET ORAL at 09:46

## 2024-03-28 RX ADMIN — CARVEDILOL 6.25 MG: 12.5 TABLET, FILM COATED ORAL at 10:37

## 2024-03-28 NOTE — PROGRESS NOTES
[facility] CARDIOLOGY  Cardiology Care Note                  []Initial visit     [x]Established visit     Patient Name: Taj Clemens - :1960 - MRN:[id number]  Primary Cardiologist: none  Consulting Cardiologist: Viridiana Gillis MD     Assessment/Plan/Discussion:Cardiology Attending:     See me in 3 weeks for echo and OV as OP   Increase entresto  Note schedule for  amio load and reduce dose in 10 days  Note start OAC on    On OMT, no jardiance due to UTI risk while bedridden  Cardiac Medications       Potassium Sparing Diuretics       spironolactone (ALDACTONE) 25 MG tablet Take 1 tablet by mouth daily     spironolactone (ALDACTONE) tablet 25 mg 25 mg, Oral, DAILY       Antiarrhythmics Type III       amiodarone (CORDARONE) 200 MG tablet 1 tablet by Per NG tube route 2 times daily     amiodarone (CORDARONE) tablet 200 mg 200 mg, Per NG tube, 2 TIMES DAILY       HMG CoA Reductase Inhibitors       atorvastatin (LIPITOR) 40 MG tablet Take 1 tablet by mouth nightly     atorvastatin (LIPITOR) tablet 40 mg 40 mg, Oral, NIGHTLY       Alpha-Beta Blockers       carvedilol (COREG) 6.25 MG tablet Take 1 tablet by mouth 2 times daily (with meals)     carvedilol (COREG) tablet 6.25 mg 6.25 mg, Oral, 2 TIMES DAILY WITH MEALS, Administer with food to minimize the risk of orthostatic hypotension       Cardiovascular Agents Misc. - Combinations       sacubitril-valsartan (ENTRESTO)  MG per tablet Take 1 tablet by mouth 2 times daily     sacubitril-valsartan (ENTRESTO)  MG per tablet 1 tablet 1 tablet, Oral, 2 TIMES DAILY       Salicylates       aspirin chewable tablet 81 mg 81 mg, Oral, DAILY             No future appointments.   A/P:  Atrial flutter persistent on admission.  New acute systolic CHF.  Stroke likely cardio-embolic.  Improvement in ejection fraction from 25% to approximately 40%.  RV function also appears better. 
        [facility]                                                                                          Hospitalist Progress Note  Sushma Madala, MD  Office Phone: [phone]        Date of Service:  3/27/2024  NAME:  Taj Clemens  :  1960  MRN:  [id number]       Admission Summary:   krish Clemens is a 63 y.o. male with a history of atrial fibrillation, HTN, and obesity who presented to [facility] ED after sister noticed confusion and garbled speech while on the telephone.  After ED physician evaluation patient with confusion and unable to speak coherently.  Per chart review and report, EKG was done that showed A-fib with RVR rate in the 200's.  Decision was made to sedate with etomidate and cardiovert with synchronized cardioversion, due to concern for lack of cerebral perfusion in setting of tachycardia and hypertension.  Cardioversion was successful per chart review.  Patient was then taken to CT department for scans.  CT head showed a left-sided MCA infarct.  ED physician had concern for aortic dissection thus a CTA of the chest abdomen and pelvis was done.  No dissection on report.  CTA of the head and neck was done after 4 and she had a large vessel occlusion in the left MCA.  Neurointerventional radiology was consulted and evaluated case and imaging and recommended patient be transferred over to [facility] for possible uncal for thrombectomy.  Patient was transferred ED to ED and then went to angio for procedure.  After arrival to [facility] patient was agitated combative requiring multiple staff to keep in bed.  General anesthesia was used for procedure and patient was intubated.  Did require some Rafael-Synephrine to keep blood pressures up.  Was unable to recannulized occluded vessel. Patient was was brought to ICU intubated placed on mechanical ventilation on 3/24.      Patient extubated, off pressors and precedex. Transferred out of ICU on 3/26.       
0800: AM assessment performed. Pt afebrile, VSS, only receiving IVF. Precedex drip turned off.   Pt opening eyes spontaneously, non-purposeful strong movement on L side extremities. No command following. Right side remains flaccid with only intermittent withdrawal to pain. Present gag and cough. Pupils 2, brisk, round.     0900: Pt's neuro checks changed from Q1 to Q2, per NP Scottia.  
3/28/2024        RE: Taj Clemens         [address] [address]      To Whom It May Concern,    RE: Romi Ovalles     Due to medical reasons, Taj Clemens  is admitted to inpatient at [facility] since 3/24/24 .   He is acutely ill and need to go to a long rehab which is yet to be determined.    She is currently helping him at bedside at the hospital.  Please consider her for missed school days.    Sincerely,      Alphonso Smith MD   
Attempted to call report to [facility], stated they will call back  
I have reviewed and agree with Bharti Zavala's documentation.  
I have reviewed discharge instructions with the patient. The patient verbalizes understanding.       Stroke Education provided to patient and girlfriend and the following topics were discussed    1. Patients personal risk factors for stroke are atrial fibrillation, hyperlipidemia, and hypertension    2. Warning signs of Stroke:        * Sudden numbness or weakness of the face, arm or leg, especially on one side of          The body            * Sudden confusion, trouble speaking or understanding        * Sudden trouble seeing in one or both eyes        * Sudden trouble walking, dizziness, loss of balance or coordination        * Sudden severe headache with no known cause      3. Importance of activation Emergency Medical Services ( 9-1-1 ) immediately if experience any warning signs of stroke.    4. Be sure and schedule a follow-up appointment with your primary care doctor or any specialists as instructed.     5. You must take medicine every day to treat your risk factors for stroke.  Be sure to take your medicines exactly as your doctor tells you: no more, no less.  Know what your medicines are for , what they do.  Anti-thrombotics /anticoagulants can help prevent strokes.  You are taking the following medicine(s)  Entresto, amiodarone, lipitor, coreg, spironolactone     6.  Smoking and second-hand smoke greatly increase your risk of stroke, cardiovascular disease and death. Smoking history yes    7. Information provided was [facility] Stroke Education Binder, Stroke Handouts, or Verbal Education    8. Documentation of teaching completed in Patient Education Activity and on Care Plan with teaching response noted?  yes   
I have reviewed, edited, and agree with the documentation of Shanthi Klein, Student RN as her preceptor.   
Neurocritical Care Brief Progress Note:    Pt presented to [facility] ED on 3/24/24 for confusion and slurred speech noticed by family. He arrived as code stroke.  Pt was found to be in a-flutter w/ RVR, and was externally cardioverted; time of a-flutter onset and use of anticoagulation unknown.  CTH revealed hypodensity w/ loss of grey-white junction in left fronto-parietal lobes consistent w/ ischemic changes. CTAh-n revealed left MCA, M1, M2 occlusion.  Pt was outside TNK window. He was transferred to [facility] for emergent thrombectomy. Pt became agitated & combative en route to [facility] - he was anesthetized and sedated for planned procedure. S/p cerebral angiogram w/ attempted thrombectomy of left MCA, TICI I.  Pt taken to ICU post-procedure.  MRI shows hyperintensity in left basal ganglia and parietal lobe consistent w/ acute infarction as well as small areas of infarction in left frontal and temporal lobes.    Pt is seen lying in bed, intubated and sedated.  Propofol turned off today at 1427, currently on dexmedetomidine. KAYLEIGH subjective complaints d/t pt condition.    Plan  SBP goal 140-180, permissive HTN  Continue asa 300mg OR daily  Continue atorvastatin 80mg qHS  ECHO pending  Neurology consult      Physical Exam:  Gen: Intubated and sedated on 0.7mcg/kg/hr dexmedetomidine  Neuro: KAYLEIGH orientation status.  Does not follow commands. KAYLEIGH speech, EOM, facial symmetry d/t condition, ETT. Pupils 1.5mm bilaterally, sluggish response to light. Withdraws to painful stimulus in LUE, LLE, no withdrawal seen in RUE, RLE. Bulk and tone normal. No involuntary movements. Gait deferred.  Skin: Warm, dry, color appropriate for ethnicity.       OSMIN Murrell - NP  Neurocritical Care Nurse Practitioner    
Neurocritical Care Brief Progress Note:    S/P Cerebral angiography and unsuccessful  TICI 1 recannulation of LMCA thrombus with Dr Franklin    Rounded on patient immediately post-procedure. He is received intubated and sedated with precedex.       Physical Exam:  Gen: Sedated  Neuro: Eyes open spontaneously. PERRL, 3 mm bilaterally. Blinks to threat. No disconjugate gaze present. EOMI. KAYLEIGH facial symmetry or tongue/palate in setting of ETT. Jeffrey spontaneously. W/D x 4. Does not follow commands. Gait deferred.  Skin: Warm, dry, color appropriate for ethnicity. R groin arteriotomy site soft and non-tender on palpation, dressing is C/D/I, with no active bleeding or drainage noted.       Huma Damian, APRN - CNP  Neurocritical Care Nurse Practitioner  [phone]    
Neurocritical Care Code Stroke Documentation      Symptoms: Expressive and receptive aphasia   Baseline mRS:   0   Last Known Well: unknown   Medical hx: Past Medical History:   Diagnosis Date    Allergic rhinitis 2017    Atrial fibrillation (HCC)     Constipation     Hemorrhoids     HTN (hypertension) 2012    Hypertension     Obesity (BMI 30-39.9) 2017    Other ill-defined conditions(799.89)     Back Pain    Voice disorder 2018      Vitals: There were no vitals filed for this visit.   Anticoagulation:  Asa only   VAN:   Positive   NIHSS:   1a-LOC:0    1b-Month/Age:2    1c-Open/Close Hand:1    2-Best Gaze:0    3-Visual Fields:0    4-Facial Palsy:0    5a-Left Arm:0    5b-Right Arm:0    6a-Left Le    6b-Right Le    7-Limb Ataxia:0    8-Sensory:0    9-Best Language:2    10-Dysarthria:1    11-Extinction/Inattention:0  TOTAL SCORE:6   Imaging (personally reviewed):   CT: Acute to subacute left temporoparietal MCA territory infarct is suspected.    CTA/CTP: Left M2 occlusion with associated large left MCA territory perfusion defect.    Plan:   TNK Candidate: NO    Mechanical thrombectomy Candidate: YES    *Perform dysphagia screening prior to any PO intake*     Discussed with: Dr Franklin    Arrival time: [facility] in ED    I have spent 60 of critical care time involved in lab review, consultations with specialist, family decision making and documentation. During this entire length of time I was immediately available to the patient.    Huma Damian, APRN - CNP  Neurocritical Care Nurse Practitioner  [phone]   
Neurology Staff Addendum:  I have personally seen and examined the patient. I have personally reviewed the chart and images. Elements of my examination included history of present illness, review of systems, review of past medical and surgical history, review of medications, and physical and neurological examination. I have personally reviewed the findings and impressions with the nurse practitioner and am in agreement with their note with changes below.     Pt is a 64yo RH male with h/o Afib, HTN, on ASA 81mg daily at home, admitted 3/24/24 on transfer from [facility] after he presented with AMS/aphasia found to be in aflutter with RVR and was cardioverted in ED. /99. CTH with left MCA hypoattenuation concerning for acute or subacute stroke. CTA H/N with left MCA occlusion.  CTP with large left MCA defect. NIHSS score 6 on arrival to [facility]. S/p left MCA thrombectomy attempt, could not be recanalized. CTA C/A with eccentric probable atherosclerotic thrombus of distal aorta on left without occlusion or dissection. 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 TTE with EF 25-30%, global hypokinesis. LDL 59.6, started on Lipitor 80mg.  HgbA1C 5.7.  Started on ASA 300mg NH.  MRI brain with moderate sized left MCA CVA. Pt is seen with his friend at bedside. Pt is now extubated and able to take PO's. Pt sleeping during my visit, wakes minimally. RN reports pt was able to feed himself today.       Blood pressure (!) 157/84, pulse 98, temperature 99 °F (37.2 °C), temperature source Oral, resp. rate 24, height 1.829 m (6' 0.01\"), weight 123 kg (271 lb 1.6 oz), SpO2 90 %.    Physical Exam:  General: Well developed well nourished obese patient in no apparent distress.   Cardiac: Regular rate and rhythm with no murmurs.   Extremities: 2+ Radial pulses, no cyanosis or edema     Neurological Exam:  Mental Status: Opens eyes spontaneously, tracks examiner, no commands.    Cranial Nerves:   
Occupational Therapy  03/24/2024     Order acknowledged and chart reviewed in prep for OT eval; pt admitted with L MCA LVO, now s/p emergent mechanical thrombectomy this AM. He remains on strict bedrest post procedure, intubated (FiO2 70%, PEEP 5), and sedated.      Per ABCDEF protocol, will work with patient when PEEP is 10.0 or less, FIO2 60% or less, and patient is following basic commands. Will follow patient peripherally.      Recommend nursing to complete with patient, as able and per protocol, in order to promote cardiopulmonary systems, maintain strength, endurance and independence:   -bed in chair position or maximize full reverse Trendelenburg position to facilitate upright activity with foot board and non-skid footwear on 3x/day ~30-60 mins each   -ROM during bathing B UEs and LEs  -positioning to prevent contractures and edema.  RASS -1/0/+1 (during SAT) Active ROM  Sitting (bed in chair position)  Standing (reverse Trendelenburg)  ADLs   RASS -3/2 Passive ROM  Sit (bed in chair position)   RASS -5/-4 Passive ROM      Thank you for your assistance.     Sheron Madsen MS, OTR/L  
Occupational Therapy  03/25/24    Chart reviewed in prep for OT session and spoke with primary RN. Pt remains intubated and sedated, not appropriate for OT at this time. Will defer and follow up as medically appropriate and able.    Thank you!  Whitney Daniel, OT    
Orders received, chart reviewed and patient evaluated by physical therapy. Pending progression with skilled acute physical therapy, recommend:  Continue to assess pending progress IPR vs SNF      Full evaluation to follow.     Nayana Vallecillo, PT, DPT  
Physical Therapy Note  03/24/2024    Order acknowledged and chart reviewed in prep for PT eval; pt admitted with L MCA LVO, now s/p emergent mechanical thrombectomy this AM. He remains on strict bedrest post procedure, intubated (FiO2 70%, PEEP 5), and sedated.     Per ABCDEF protocol, will work with patient when PEEP is 10.0 or less, FIO2 60% or less, and patient is following basic commands. Will follow patient peripherally.     Recommend nursing to complete with patient, as able and per protocol, in order to promote cardiopulmonary systems, maintain strength, endurance and independence:   -bed in chair position or maximize full reverse Trendelenburg position to facilitate upright activity with foot board and non-skid footwear on 3x/day ~30-60 mins each   -ROM during bathing B UEs and LEs  -positioning to prevent contractures and edema.  RASS -1/0/+1 (during SAT) Active ROM  Sitting (bed in chair position)  Standing (reverse Trendelenburg)  ADLs   RASS -3/2 Passive ROM  Sit (bed in chair position)   RASS -5/-4 Passive ROM     Thank you for your assistance.     Nayana Vallecillo, PT, DPT  
Physical Therapy Note  03/25/2024    Chart reviewed in prep for PT eval; spoke with RN who reports that pt remains intubated and sedated with no command following. Will continue to follow as he works towards medical stability.    Recommend nursing to complete with patient, as able and per protocol, in order to promote cardiopulmonary systems, maintain strength, endurance and independence:   -bed in chair position or maximize full reverse Trendelenburg position to facilitate upright activity with foot board and non-skid footwear on 3x/day ~30-60 mins each   -ROM during bathing B UEs and LEs  -positioning to prevent contractures and edema.  RASS -1/0/+1 (during SAT) Active ROM  Sitting (bed in chair position)  Standing (reverse Trendelenburg)  ADLs   RASS -3/2 Passive ROM  Sit (bed in chair position)   RASS -5/-4 Passive ROM     Thank you for your assistance.     Nayana Vallecillo, PT, DPT  
SLP Contact Note    Noted pt remains intubated. Will hold SLP for now.      Brigid Kumar M.Ed, PhD(c), CCC-SLP  Speech-Language Pathologist    
SLP Contact Note  Consult received and appreciated. Pt chart reviewed. Pt currently intubated and sedated. Will hold SLP for now.      Brigid Kumar M.Ed, PhD(c), CCC-SLP  Speech-Language Pathologist      Hospital Course  3/24: Admitted for expressive-receptive aphasia. NIHSS 6 for month/age, open/close hand, best language, and dysarthria. CT showed subacute left temporoparietal MCA territory infarct. Left M2 occlusion with associated large left MCA territory perfusion defect. Not a candidate for TNK given unknown last known well.  Went to neurointerventional radiology TICI 1. Noted significant tortuosity of the cervical and intracranial vasculature noted. Pt intubated post-procedure.    Swallow Screen: n/a due to intubation.     Prandial Aspiration Risk Factors: Large L MCA stroke.  Post-Prandial Aspiration Risk Factors:     Pertinent Imaging and Labs:  3/24/24 CT Head:  IMPRESSION:  Acute to subacute left temporoparietal MCA territory infarct is  suspected.    3/24/24 CTA Chest Abdomen Pelvis:  IMPRESSION:  Eccentric probable atherosclerotic thrombus of the distal aorta on  the left without occlusion or evident dissection right internal iliac artery  occlusion which appears secondary to thrombosis of aneurysmal dilation. No other  vascular abnormality or acute abnormality otherwise    3/24/24 CT Brain Perfusion/Head/Neck:  Left M2 occlusion with associated large left MCA territory perfusion defect.     WBC: 8.5 WNL  Urinalysis: Performed 3/24/24 no deficits.       Previously seen by SLP:  No.  Reason for consult: Acute stroke.      
SLP Contact Note  SLP evaluation complete. Recommend pureed diet/thin liquids (hand over hand via cup vs trying to stimulate straw sips on stronger side). Pt with global aphasia. Full note to follow.     Brigid Kumar M.Ed, PhD(c), CCC-SLP  Speech-Language Pathologist      Hospital Course  3/24: Admitted for expressive-receptive aphasia. NIHSS 6 for month/age, open/close hand, best language, and dysarthria. CT showed subacute left temporoparietal MCA territory infarct. Left M2 occlusion with associated large left MCA territory perfusion defect. Not a candidate for TNK given unknown last known well.  Went to neurointerventional radiology TICI 1. Noted significant tortuosity of the cervical and intracranial vasculature noted. Pt intubated post-procedure.  3/25: Remained intubated until 1340 (1 day intubation). No command following. Right side flaccid. Present gag and cough.      Swallow Screen: n/a due to intubation.      Prandial Aspiration Risk Factors: Large L MCA stroke.  Post-Prandial Aspiration Risk Factors:      Pertinent Imaging and Labs:  3/24/24 CT Head:  IMPRESSION:  Acute to subacute left temporoparietal MCA territory infarct is  suspected.     3/24/24 CTA Chest Abdomen Pelvis:  IMPRESSION:  Eccentric probable atherosclerotic thrombus of the distal aorta on  the left without occlusion or evident dissection right internal iliac artery  occlusion which appears secondary to thrombosis of aneurysmal dilation. No other  vascular abnormality or acute abnormality otherwise     3/24/24 CT Brain Perfusion/Head/Neck:  Left M2 occlusion with associated large left MCA territory perfusion defect.      WBC: 8.5 WNL  Urinalysis: Performed 3/24/24 no deficits.         Previously seen by SLP:  No.  Reason for consult: Acute stroke.     
stroke prevention if ok with team, but will likely need to start anticoagulation due to A. Fib, timing dependent on results of MRI  - MRI of Brain to assess extent of ischemia  - Lipid panel shows LDL 59.6, goal <70, continue Lipitor 80 mg QHS  - Hgb A1C 5.7  - ECHO pending   - PT/OT/SLP emelinaals  - Neurology consult  - vent weaning/management per primary team    Family updated at bedside. Plan discussed with Intensivist, Dr. Franklin, and RN.     OSMIN Alcala - NP  Neurocritical Care Nurse Practitioner    Addendum:1700 MRI of Brain reviewed and shows a moderate-sized acute left MCA territory infarct. No hemorrhage.   Patient remains intubated and Precedex. /119 on Neosynephrine drip. Ok to wean Rafael and change BP goals 120-180. Treat SBP >180. RN reports BP drops when lowering Rafael. Spoke with Intensivist and he has ordered midodrine if patient experiences hypotension. Discussed with Dr. Huynh, Dr. Franklin and updated patient's sister.   ECHO shows EF 25-30%. Global hypokinesis present. Mild mitral and tricuspid valve regurgitation. Moderate pulmonary HTN. Patient's sister updated on low EF.  Intensivist consulted cardiology due to ECHO results.          
History     Smoking Tobacco Use: Former     Smokeless Tobacco Use: Never     Passive Exposure: Not on file   Alcohol Use: Not At Risk (7/18/2023)    AUDIT-C     Frequency of Alcohol Consumption: 2-4 times a month     Average Number of Drinks: 1 or 2     Frequency of Binge Drinking: Less than monthly   Financial Resource Strain: Not on file   Food Insecurity: Not on file   Transportation Needs: Not on file   Physical Activity: Not on file   Stress: Not on file   Social Connections: Not on file   Intimate Partner Violence: Not on file   Depression: Not on file   Housing Stability: Not on file   Interpersonal Safety: Not on file   Utilities: Not on file       Review of Systems:   Review of systems not obtained due to patient factors.       Vital Signs:    Last 24hrs VS reviewed since prior progress note. Most recent are:  Vitals:    03/28/24 1356   BP: (!) 149/107   Pulse: 79   Resp: 24   Temp: 98.1 °F (36.7 °C)   SpO2:        No intake or output data in the 24 hours ending 03/28/24 1422       Physical Examination:     I had a face to face encounter with this patient and independently examined them on 3/28/2024 as outlined below:          General : alert, awake, no acute distress,   HEENT: PEERL, EOMI, moist mucus membrane  Neck: supple, no JVD, no meningeal signs  Chest: Clear to auscultation bilaterally   CVS: S1 S2 heard, Capillary refill less than 2 seconds  Abd: soft/ non tender, non distended, BS physiological,   Ext: no clubbing, no cyanosis, no edema, brisk 2+ DP pulses  Neuro/Psych: pleasant mood and affect, expressive aphasia, left facial droop. RUE and RLE - 0/5.  Skin: warm     Data Review:    I personally reviewed  Image      I have personally and independently reviewed all pertinent labs, diagnostic studies, imaging, and have provided independent interpretation of the same.     Labs:     Recent Labs     03/27/24  0952 03/28/24  0105   WBC 8.5 8.5   HGB 12.6 13.1   HCT 39.0 38.3    164       Recent 
angiogram w/ attempted thrombectomy, TICI I.     Q1hr neuro checks  SBP goal 140-180  Hold AC at this time d/t risk for hemorrhagic conversion; Recommend to begin after at least one week.  Continue asa 300mg [place] daily  Continue atorvastatin 40mg qHS  PT/OT/SLP evals when able      Activity:Bedrest  DVT ppx:SCDs  Dispo: ICU    Plan d/w Jaun Clark NP, primary RN      HPI:  Taj Clemens is a 63 y.o. AA male w/ pmh of a-fib (no OAC use documented), HTN, hydrocele, and obesity. He presented to [facility] ED in early AM hours of 3/24/24 for confusion and slurred speech. A Code Stroke was called, CTH showed acute to subacute left temporoparietal MCA territory infarct.  CTAh-n showed Left M2 occlusion with associated large left MCA territory perfusion defect on CTP. There was no initial NIHSS available and pt was not candidate for TNK given unknown LKWT. During workup at OSH, pt was noted to be in aflutter/afib with rate in 160s. He was medicated with Etomidate 10mg and synchronized cardioversion was performed. Pt was then transferred to [facility] for higher level of care including cerebral angiogram.  Pt became agitated & combative en route to [facility] - he was anesthetized and sedated for planned procedure. S/p cerebral angiogram w/ attempted thrombectomy of left MCA, TICI I.        Subjective:   No acute neuro events overnight. Pt seen lying in bed, intubated and sedated.    KAYLEIGH subjective complaints d/t pt condition.    Current Facility-Administered Medications   Medication Dose Route Frequency Provider Last Rate Last Admin    sodium chloride flush 0.9 % injection 5-40 mL  5-40 mL IntraVENous 2 times per day Jaun Cadena NP-C   10 mL at 03/24/24 2036    sodium chloride flush 0.9 % injection 5-40 mL  5-40 mL IntraVENous PRN Jaun Cadena NP-C        0.9 % sodium chloride infusion   IntraVENous PRN Jaun Cadena, NP-C        ondansetron (ZOFRAN) injection 4 mg  4 mg IntraVENous Q6H PRN Jaun Cadena, 
(37.2 °C), Min:98.2 °F (36.8 °C), Max:100.2 °F (37.9 °C)           Intake/Output:     Intake/Output Summary (Last 24 hours) at 3/25/2024 1525  Last data filed at 3/25/2024 1200  Gross per 24 hour   Intake 2370.98 ml   Output 200 ml   Net 2170.98 ml       Imaging:    CT head without contrast 3/24/2024  IMPRESSION:  Acute to subacute left temporoparietal MCA territory infarct is  suspected.     CTA head and neck with contrast and brain perfusion 3/24/2024  *PRELIMINARY REPORT*  Left M2 occlusion with associated large left MCA territory perfusion defect.    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 No other  vascular abnormality or acute abnormality otherwise      Echo:  No valid procedures specified.     CRITICAL CARE DOCUMENTATION  I had a face to face encounter with the patient, reviewed and interpreted patient data including clinical events, labs, images, vital signs, I/O's, and examined patient.  I have discussed the case and the plan and management of the patient's care with the consulting services, the bedside nurses and the respiratory therapist.      NOTE OF PERSONAL INVOLVEMENT IN CARE   This patient has a high probability of imminent, clinically significant deterioration, which requires the highest level of preparedness to intervene urgently. I participated in the decision-making and personally managed or directed the management of the following life and organ supporting interventions that required my frequent assessment to treat or prevent imminent deterioration.    I personally spent 50 minutes of critical care time.  This is time spent at this critically ill patient's bedside actively involved in patient care as well as the coordination of care.  This does not include any procedural 
cardioversion then transferred to [facility].  Underwent cerebral angiogram at [facility] w/ attempted thrombectomy of Left MCA but could not be recanalized.  Imaging also found probable atherosclerotic thrombus of distal aorta on left without occlusion or evident dissection right internal hector artery occlusion (appears secondary to thrombosis of aneurysmal dilation).   Currently in NSR with [facility] on telemetry. He had required neosynephrine to keep BP up for neuro purposes but now on midodrine.     Echocardiogram showed EF 25-30%, global hypok, RV systolic function reduced and moderate pulmonary hypertension noted.   Trop 16, 26.  Creatinine 1.65 this a.m.  A1C=5.7 and TSH 4.88.     FH: both sisters have hx of DVT and are on OAC(per chart)    SH: never smoker, no drug use, occasional ETOH use. Retired, runs small business.     ____________________________________________________________      Cardiac testing  03/24/24    ECHO (TTE) LIMITED (PRN CONTRAST/BUBBLE/STRAIN/3D) 03/24/2024 11:49 AM (Final)    Interpretation Summary    Left Ventricle: Severely reduced left ventricular systolic function with a visually estimated EF of 25 - 30%. Not well visualized. Left ventricle size is normal. Normal wall thickness. Global hypokinesis present.    Right Ventricle: Not well visualized. Reduced systolic function. TAPSE is abnormal. TAPSE is 0.6 cm.    Mitral Valve: Mild regurgitation.    Pulmonary Arteries: Moderate pulmonary hypertension present.    Image quality is technically difficult. Technically difficult study, technically difficult study due to patient's body habitus, limited Doppler study due to patient's condition, technically difficult Doppler study and procedure performed with the patient in a supine position.    Signed by: Pranav Hirsch MD on 3/24/2024 11:49 AM    No results found for this or any previous visit.     No results found for this or any previous visit.        Most recent HS troponins:  No results for 
pulses palpable   Skin: Warm, dry, color appropriate for ethnicity. Right groin arteriotomy site c/d/I. No hematoma, bleeding, or bruising noted.     Neurologic Exam:  Mental Status:    Intubated and sedated on Precedex. Precedex stopped for SAT/SBT.      Speech and Language:      Unable to assess speech. No command following.     Cranial Nerves:     PERRL.  Blinks to threat on the left. No blink to threat on the right.   Attempts to track horizontally with eyes with Doll's eye maneuver.    No facial droop appreciated with ETT in place, but difficult to fully assess.  Unable to assess tongue and palate.     Motor:       with left hand, but not on command, possibly reflexive. Withdraws to pain in LUE and LLE. No spontaneous movement. No movement to stimulus in RUE. Trace movement to painful stimuli in RLE.     No involuntary movements.    Sensation:      No withdrawal to pain in RUE. Minimal withdrawal to pain in RLE. Sensation intact on the left side.     Coordination:  Unable to assess FTN and HTS due to patient condition.     Gait:   Deferred.      24 hour results:  Recent Results (from the past 24 hour(s))   Echo (TTE) limited (PRN contrast/bubble/strain/3D)    Collection Time: 03/24/24 10:34 AM   Result Value Ref Range    Body Surface Area 2.51 m2    LVOT Diameter 2.9 cm    LV Ejection Fraction A2C 21 %    LV Ejection Fraction A4C 27 %    EF BP 26 (A) 55 - 100 %    LV Ejection Fraction A2C 16 %    LV Ejection Fraction A4C 36 %    LV EDV A2C 99 mL    LV EDV A4C 100 mL    LV EDV  67 - 155 mL    LV ESV A2C 84 mL    LV ESV A4C 65 mL    LV ESV BP 76 (A) 22 - 58 mL    IVSd M-mode 1.7 (A) 0.6 - 1.0 cm    IVSs M-mode 1.5 cm    LVIDd M-mode 4.7 4.2 - 5.9 cm    LVPWd M-mode 1.7 (A) 0.6 - 1.0 cm    LVOT Peak Gradient 2 mmHg    LVOT Peak Velocity 0.7 m/s    RVSP 36 mmHg    Est. RA Pressure 15 mmHg    AV Area by Peak Velocity 6.1 cm2    AV Peak Gradient 2 mmHg    AV Peak Velocity 0.7 m/s    MV A Velocity 0.28 m/s 
1.8 x 1.2 cm.  There is no acute hemorrhage, extra-axial fluid collection, or mass effect.  There is no cerebellar tonsillar herniation. Expected arterial flow-voids are  present.    The paranasal sinuses, mastoid air cells, and middle ears are clear. The orbital  contents are within normal limits. No significant osseous or scalp lesions are  identified.    Impression  1. Moderate-sized acute left MCA territory infarct as above.    CT Result (most recent):  CTA HEAD NECK W CONTRAST 03/24/2024      EXAMINATION:  CT ANGIOGRAPHY HEAD AND NECK    COMPARISON: None    TECHNIQUE:  Following the uneventful administration of  intravenous contrast,  axial CT angiography of the head and neck was performed.  3D image  postprocessing was performed.  CT dose reduction was achieved through use of a  standardized protocol tailored for this examination and automatic exposure  control for dose modulation.  Cerebral perfusion analysis using computed tomography with contrast  administration, including post processing of parametric maps with the  termination of cerebral blood flow, cerebral blood volume, and mean transit  time.  This study was analyzed by the [employer] algorithm.    FINDINGS:    CTA NECK    Aortic Arch: Patent.  Right Common Carotid Artery: Patent.  Right Internal Carotid Artery: Medialized. Patent and tortuous.  NASCET Right: 0-25%.  Left Common Carotid Artery: Patent.  Left Internal Carotid Artery: Patent.  NASCET Left: 0-25%.  Carotid stenosis determined using NASCET criteria.  Right Vertebral Artery: Patent.  Left Vertebral Artery: Patent.  Cervical Soft Tissues: No significant abnormality.  Lung Apices: No significant abnormality.  Bones: No destructive bone lesion.  Additional Comments: N/A.    CTA HEAD    Posterior Circulation: No flow limiting stenosis or occlusion.  Anterior Circulation: Internal carotid arteries are patent. Anterior cerebral  arteries and right middle cerebral artery are patent. There is abrupt 
by Carmen Yan, [facility] NP Student, under my direct supervision.     Elmer Guerrero [facility]-BC    Critical Care Medicine  [facility]    
(KUB) (SINGLE AP VIEW) 03/24/2024    Narrative  EXAM:  XR ABDOMEN (KUB) (SINGLE AP VIEW)    INDICATION: NGT placement confirmation.    COMPARISON: CT 3/24/2024.    TECHNIQUE: Supine frontal abdomen (KUB).    FINDINGS: Enteric tube traverses expected course to below the diaphragm into the  left upper quadrant. No dilated small bowel. Stool and gas are present in the  colon. No pathologic calcification. Osseous structures are age appropriate.    Impression  Enteric tube in expected position.    CTA abdomen chest pelvis 3/24/24:  IMPRESSION:  Eccentric probable atherosclerotic thrombus of the distal aorta on  the left without occlusion or evident dissection right internal iliac artery  occlusion which appears secondary to thrombosis of aneurysmal dilation. No other  vascular abnormality or acute abnormality otherwise    CTA head/neck:      IMPRESSION:  Acute occlusion left M1-M2 middle cerebral artery at the bifurcation with  associated large territory hypoperfusion, penumbra 167 cc.  Recent Labs     03/24/24  0113 03/24/24  0519 03/25/24 0222 03/26/24 0221    137 140 138   K 3.7 4.5 4.1 3.7    109* 112* 112*   CO2 24 25 23 24   BUN 16 14 12 12   MG 2.0  --  1.9  --    PHOS  --   --  3.0  --    ALT 48  --   --   --    INR 1.1 1.1  --   --      Recent Labs     03/24/24  0519 03/25/24 0222 03/26/24 0221   WBC 8.5 8.1 8.6   HGB 13.5 12.5 12.4   HCT 40.2 38.1 38.5    220 189     Creatinine   Date Value   03/26/2024 1.34 MG/DL (H)   03/25/2024 1.40 MG/DL (H)   03/24/2024 1.65 MG/DL (H)   03/24/2024 1.90 MG/DL (H)   10/05/2021 1.35 mg/dL (H)     POC Creatinine (MG/DL)   Date Value   03/24/2024 1.4 (H)       Current meds:    Current Facility-Administered Medications:     amiodarone (CORDARONE) tablet 400 mg, 400 mg, Per NG tube, BID, Jennifer Whaley, APRN - NP, 400 mg at 03/26/24 0751    aspirin chewable tablet 81 mg, 81 mg, Oral, Daily, Pia Arroyo, APRN - NP, 81 mg at 03/26/24 0751    sodium

## 2024-03-28 NOTE — TELEPHONE ENCOUNTER
Likely going to [facility] in next day or so  Please make appt with me with echo and OV same day in 3-5  weeks, he may have to come by stretcher, can go ahead and put on schedule and we will add to dc paperwork    Chemo, Please get med list on Monday once he transfer for me to review amio, eliqius and CHF med dosing.

## 2024-03-28 NOTE — DISCHARGE INSTRUCTIONS
Discharge Instructions       PATIENT ID: Taj Clemens  MRN: [id number]   YOB: 1960    DATE OF ADMISSION: 3/24/2024   DATE OF DISCHARGE: 3/28/2024    PRIMARY CARE PROVIDER: No primary care provider on file.     ATTENDING PHYSICIAN: Alphonso Smith MD   DISCHARGING PROVIDER: Alphonso Smith MD    To contact this individual call [phone] and ask the  to page.   If unavailable ask to be transferred the Adult Hospitalist Department.    DISCHARGE DIAGNOSES Acute CVA    CONSULTATIONS: [unfilled]    PROCEDURES/SURGERIES: * No surgery found *    PENDING TEST RESULTS:   At the time of discharge the following test results are still pending:     FOLLOW UP APPOINTMENTS:   [facility]     ADDITIONAL CARE RECOMMENDATIONS: - Ok to start anticoagulation 1 week from stroke onset (can initiate on 3/31/24)     DIET: cardiac diet      ACTIVITY: activity as tolerated    WOUND CARE:     EQUIPMENT needed:       DISCHARGE MEDICATIONS:   See Medication Reconciliation Form    It is important that you take the medication exactly as they are prescribed.   Keep your medication in the bottles provided by the pharmacist and keep a list of the medication names, dosages, and times to be taken in your wallet.   Do not take other medications without consulting your doctor.       NOTIFY YOUR PHYSICIAN FOR ANY OF THE FOLLOWING:   Fever over 101 degrees for 24 hours.   Chest pain, shortness of breath, fever, chills, nausea, vomiting, diarrhea, change in mentation, falling, weakness, bleeding. Severe pain or pain not relieved by medications.  Or, any other signs or symptoms that you may have questions about.      DISPOSITION:    Home With:   OT  PT  HH  RN      x SNF/Inpatient Rehab/LTAC    Independent/assisted living    Hospice    Other:     CDMP Checked:   Yes x     PROBLEM LIST Updated:  Yes x       Signed:   Alphonso Smith MD  3/28/2024  3:06 PM

## 2024-03-28 NOTE — PLAN OF CARE
Problem: Discharge Planning  Goal: Discharge to home or other facility with appropriate resources  3/27/2024 0355 by Yanci Gavin RN  Outcome: Progressing  Discharge to home or other facility with appropriate resources: Identify barriers to discharge with patient and caregiver     Problem: Pain  Goal: Verbalizes/displays adequate comfort level or baseline comfort level  3/27/2024 0355 by Yanci Gavin RN  Outcome: Progressing     Problem: Safety - Medical Restraint  Goal: Remains free of injury from restraints (Restraint for Interference with Medical Device)  Description: INTERVENTIONS:  1. Determine that other, less restrictive measures have been tried or would not be effective before applying the restraint  2. Evaluate the patient's condition at the time of restraint application  3. Inform patient/family regarding the reason for restraint  4. Q2H: Monitor safety, psychosocial status, comfort, nutrition and hydration  3/27/2024 0355 by Yanci Gavin RN  Outcome: Progressing     Problem: Safety - Adult  Goal: Free from fall injury  3/27/2024 0355 by Yanci Gavin RN  Outcome: Progressing  Free From Fall Injury:   Instruct family/caregiver on patient safety   Based on caregiver fall risk screen, instruct family/caregiver to ask for assistance with transferring infant if caregiver noted to have fall risk factors     Problem: Chronic Conditions and Co-morbidities  Goal: Patient's chronic conditions and co-morbidity symptoms are monitored and maintained or improved  3/27/2024 0355 by Yanci Gavin RN  Outcome: Progressing  Care Plan - Patient's Chronic Conditions and Co-Morbidity Symptoms are Monitored and Maintained or Improved: Monitor and assess patient's chronic conditions and comorbid symptoms for stability, deterioration, or improvement     Problem: Skin/Tissue Integrity  Goal: Absence of new skin breakdown  Description: 1.  Monitor for areas of redness and/or skin breakdown  2.  Assess vascular access sites 
  Problem: Discharge Planning  Goal: Discharge to home or other facility with appropriate resources  3/27/2024 1228 by Elba Kelsey, RN  Outcome: Progressing  Flowsheets (Taken 3/27/2024 0800)  Discharge to home or other facility with appropriate resources:   Identify barriers to discharge with patient and caregiver   Arrange for needed discharge resources and transportation as appropriate   Identify discharge learning needs (meds, wound care, etc)   Arrange for interpreters to assist at discharge as needed   Refer to discharge planning if patient needs post-hospital services based on physician order or complex needs related to functional status, cognitive ability or social support system  3/27/2024 0355 by Ynaci Gavin, RN  Outcome: Progressing     Problem: Pain  Goal: Verbalizes/displays adequate comfort level or baseline comfort level  3/27/2024 1228 by Elba Kelsey, RN  Outcome: Progressing  Flowsheets (Taken 3/27/2024 0930)  Verbalizes/displays adequate comfort level or baseline comfort level:   Encourage patient to monitor pain and request assistance   Assess pain using appropriate pain scale   Administer analgesics based on type and severity of pain and evaluate response   Implement non-pharmacological measures as appropriate and evaluate response   Consider cultural and social influences on pain and pain management   Notify Licensed Independent Practitioner if interventions unsuccessful or patient reports new pain  3/27/2024 0355 by Yanci Gavin, RN  Outcome: Progressing     Problem: Safety - Medical Restraint  Goal: Remains free of injury from restraints (Restraint for Interference with Medical Device)  Description: INTERVENTIONS:  1. Determine that other, less restrictive measures have been tried or would not be effective before applying the restraint  2. Evaluate the patient's condition at the time of restraint application  3. Inform patient/family regarding the reason for restraint  4. Q2H: Monitor 
  Problem: Discharge Planning  Goal: Discharge to home or other facility with appropriate resources  3/27/2024 2033 by Yanci Gavin RN  Outcome: Progressing  Discharge to home or other facility with appropriate resources:   Identify barriers to discharge with patient and caregiver   Arrange for needed discharge resources and transportation as appropriate   Identify discharge learning needs (meds, wound care, etc)   Arrange for interpreters to assist at discharge as needed   Refer to discharge planning if patient needs post-hospital services based on physician order or complex needs related to functional status, cognitive ability or social support system     Problem: Pain  Goal: Verbalizes/displays adequate comfort level or baseline comfort level  3/27/2024 2033 by Yanci Gavin RN  Outcome: Progressing  Verbalizes/displays adequate comfort level or baseline comfort level:   Encourage patient to monitor pain and request assistance   Assess pain using appropriate pain scale   Administer analgesics based on type and severity of pain and evaluate response   Implement non-pharmacological measures as appropriate and evaluate response   Consider cultural and social influences on pain and pain management   Notify Licensed Independent Practitioner if interventions unsuccessful or patient reports new pain     Problem: Safety - Medical Restraint  Goal: Remains free of injury from restraints (Restraint for Interference with Medical Device)  Description: INTERVENTIONS:  1. Determine that other, less restrictive measures have been tried or would not be effective before applying the restraint  2. Evaluate the patient's condition at the time of restraint application  3. Inform patient/family regarding the reason for restraint  4. Q2H: Monitor safety, psychosocial status, comfort, nutrition and hydration  3/27/2024 2033 by Yanci Gavin, RN  Outcome: Progressing     Problem: Safety - Adult  Goal: Free from fall injury  3/27/2024 2033 
  Problem: Discharge Planning  Goal: Discharge to home or other facility with appropriate resources  Outcome: Progressing  Flowsheets (Taken 3/24/2024 2000)  Discharge to home or other facility with appropriate resources:   Identify barriers to discharge with patient and caregiver   Arrange for needed discharge resources and transportation as appropriate   Identify discharge learning needs (meds, wound care, etc)     Problem: Pain  Goal: Verbalizes/displays adequate comfort level or baseline comfort level  Outcome: Progressing  Flowsheets (Taken 3/24/2024 2000)  Verbalizes/displays adequate comfort level or baseline comfort level:   Assess pain using appropriate pain scale   Administer analgesics based on type and severity of pain and evaluate response   Implement non-pharmacological measures as appropriate and evaluate response     Problem: Safety - Medical Restraint  Goal: Remains free of injury from restraints (Restraint for Interference with Medical Device)  Description: INTERVENTIONS:  1. Determine that other, less restrictive measures have been tried or would not be effective before applying the restraint  2. Evaluate the patient's condition at the time of restraint application  3. Inform patient/family regarding the reason for restraint  4. Q2H: Monitor safety, psychosocial status, comfort, nutrition and hydration  Outcome: Progressing  Flowsheets (Taken 3/24/2024 2000)  Remains free of injury from restraints (restraint for interference with medical device):   Determine that other, less restrictive measures have been tried or would not be effective before applying the restraint   Evaluate the patient's condition at the time of restraint application   Inform patient/family regarding the reason for restraint   Every 2 hours: Monitor safety, psychosocial status, comfort, nutrition and hydration     Problem: Safety - Adult  Goal: Free from fall injury  Outcome: Progressing     Problem: Chronic Conditions and 
  Problem: Discharge Planning  Goal: Discharge to home or other facility with appropriate resources  Outcome: Progressing  Flowsheets (Taken 3/26/2024 1815)  Discharge to home or other facility with appropriate resources: Identify barriers to discharge with patient and caregiver     Problem: Pain  Goal: Verbalizes/displays adequate comfort level or baseline comfort level  Outcome: Progressing     Problem: Safety - Medical Restraint  Goal: Remains free of injury from restraints (Restraint for Interference with Medical Device)  Description: INTERVENTIONS:  1. Determine that other, less restrictive measures have been tried or would not be effective before applying the restraint  2. Evaluate the patient's condition at the time of restraint application  3. Inform patient/family regarding the reason for restraint  4. Q2H: Monitor safety, psychosocial status, comfort, nutrition and hydration  Outcome: Progressing     Problem: Safety - Adult  Goal: Free from fall injury  Outcome: Progressing  Flowsheets (Taken 3/26/2024 1815)  Free From Fall Injury:   Instruct family/caregiver on patient safety   Based on caregiver fall risk screen, instruct family/caregiver to ask for assistance with transferring infant if caregiver noted to have fall risk factors     Problem: Chronic Conditions and Co-morbidities  Goal: Patient's chronic conditions and co-morbidity symptoms are monitored and maintained or improved  Outcome: Progressing  Flowsheets  Taken 3/26/2024 1815 by Bharti Zavala, RN  Care Plan - Patient's Chronic Conditions and Co-Morbidity Symptoms are Monitored and Maintained or Improved: Monitor and assess patient's chronic conditions and comorbid symptoms for stability, deterioration, or improvement  Taken 3/26/2024 0800 by Ben De La Garza, RN  Care Plan - Patient's Chronic Conditions and Co-Morbidity Symptoms are Monitored and Maintained or Improved: Monitor and assess patient's chronic conditions and comorbid symptoms 
  Problem: Discharge Planning  Goal: Discharge to home or other facility with appropriate resources  Outcome: Progressing  Flowsheets (Taken 3/28/2024 0800)  Discharge to home or other facility with appropriate resources:   Identify barriers to discharge with patient and caregiver   Arrange for needed discharge resources and transportation as appropriate   Identify discharge learning needs (meds, wound care, etc)   Arrange for interpreters to assist at discharge as needed   Refer to discharge planning if patient needs post-hospital services based on physician order or complex needs related to functional status, cognitive ability or social support system     Problem: Pain  Goal: Verbalizes/displays adequate comfort level or baseline comfort level  Outcome: Progressing  Flowsheets (Taken 3/28/2024 0930)  Verbalizes/displays adequate comfort level or baseline comfort level:   Encourage patient to monitor pain and request assistance   Assess pain using appropriate pain scale   Administer analgesics based on type and severity of pain and evaluate response   Implement non-pharmacological measures as appropriate and evaluate response   Consider cultural and social influences on pain and pain management   Notify Licensed Independent Practitioner if interventions unsuccessful or patient reports new pain     Problem: Safety - Medical Restraint  Goal: Remains free of injury from restraints (Restraint for Interference with Medical Device)  Description: INTERVENTIONS:  1. Determine that other, less restrictive measures have been tried or would not be effective before applying the restraint  2. Evaluate the patient's condition at the time of restraint application  3. Inform patient/family regarding the reason for restraint  4. Q2H: Monitor safety, psychosocial status, comfort, nutrition and hydration  Outcome: Progressing     Problem: Safety - Adult  Goal: Free from fall injury  Outcome: Progressing  Flowsheets (Taken 3/28/2024 
  Problem: Occupational Therapy - Adult  Goal: By Discharge: Performs self-care activities at highest level of function for planned discharge setting.  See evaluation for individualized goals.  Description: FUNCTIONAL STATUS PRIOR TO ADMISSION:        , ADL Assistance: Independent,  ,  ,  ,  ,  , Homemaking Assistance: Independent, Ambulation Assistance: Independent, Transfer Assistance: Independent, Active : Yes            HOME SUPPORT: Patient lived with significant other but didn't require assistance. 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  Patient will maintain alertness for 5 minutes with moderate verbal/visual/tactile stimuli in prep for ADL engagement within 7 days.  2.  Patient will follow >25% of commands within 7 day(s).  3.  Patient will tolerate sitting EOB in prep for ADL engagement for >2 minutes with Maximal assistance within 7 day(s).  4.  Patient will perform rolling R and L in prep for bed level toileting with moderate assistance within 7 day(s).  5.  Patient will participate in upper extremity therapeutic exercise/activities with Maximal Assist within 7 day(s).    6.  Patient will perform bed level grooming tasks with Moderate assistance within 7 days.  7.  Patient will complete Fugl Vera assessment within 7 days.   Outcome: Progressing   OCCUPATIONAL THERAPY EVALUATION    Patient: Taj Clemens (63 y.o. male)  Date: 3/26/2024  Primary Diagnosis: Cerebrovascular accident (CVA) due to occlusion of left middle cerebral artery (HCC) [I63.512]  Acute cerebrovascular accident (CVA) (HCC) [I63.9]         Precautions:                    ASSESSMENT :  The patient is limited by decreased functional mobility, independence in ADLs, high-level IADLs, ROM and strength with noted RUE flaccidity, sensation, activity tolerance, endurance, safety awareness, cognition, command following, 
  Problem: Physical Therapy - Adult  Goal: By Discharge: Performs mobility at highest level of function for planned discharge setting.  See evaluation for individualized goals.  Description: FUNCTIONAL STATUS PRIOR TO ADMISSION: Patient was independent and active without use of DME.    HOME SUPPORT PRIOR TO ADMISSION: The patient lived with his significant other but did not require assistance.    Physical Therapy Goals  Initiated 3/26/2024  1.  Patient will move from supine to sit and sit to supine, scoot up and down, and roll side to side in bed with maximal assistance within 7 day(s).    2.  Patient will perform sit to stand with maximal assistance x2 within 7 day(s).  3.  Patient will transfer from bed to chair and chair to bed with maximal assistance x2 using the least restrictive device within 7 day(s).  4.  Patient will demo fair sitting balance at EOB x2min in prep for mobility progression within 7 days.  5.  Patient will improve Julio Balance score by 7 points within 7 days.   3/28/2024 1437 by Anusha Roberto, PT  Outcome: Not Progressing     
  Problem: Physical Therapy - Adult  Goal: By Discharge: Performs mobility at highest level of function for planned discharge setting.  See evaluation for individualized goals.  Description: FUNCTIONAL STATUS PRIOR TO ADMISSION: Patient was independent and active without use of DME.    HOME SUPPORT PRIOR TO ADMISSION: The patient lived with his significant other but did not require assistance.    Physical Therapy Goals  Initiated 3/26/2024  1.  Patient will move from supine to sit and sit to supine, scoot up and down, and roll side to side in bed with maximal assistance within 7 day(s).    2.  Patient will perform sit to stand with maximal assistance x2 within 7 day(s).  3.  Patient will transfer from bed to chair and chair to bed with maximal assistance x2 using the least restrictive device within 7 day(s).  4.  Patient will demo fair sitting balance at EOB x2min in prep for mobility progression within 7 days.  5.  Patient will improve Julio Balance score by 7 points within 7 days.   Outcome: Progressing     PHYSICAL THERAPY EVALUATION    Patient: Taj Clemens (63 y.o. male)  Date: 3/26/2024  Primary Diagnosis: Cerebrovascular accident (CVA) due to occlusion of left middle cerebral artery (HCC) [I63.512]  Acute cerebrovascular accident (CVA) (HCC) [I63.9]       Precautions:                  SBP goal 120-180      ASSESSMENT :   DEFICITS/IMPAIRMENTS:   The patient presents with impaired functional mobility as compared to baseline level 2* expressive and receptive aphasia, decreased tolerance to activity, global weakness, R hemiparesis, L gaze and rotation preference, impaired balance, and inferred impaired gait following admission for acute L MCA LVO, now s/p unsuccessful mechanical thrombectomy. At baseline, he lived at home with his significant other and was fully indep and active without AD.    Received pt supine in bed, now extubated to 2L O2. Able to open his eyes with persistent stimulation; attempts to follow 
  Problem: Physical Therapy - Adult  Goal: By Discharge: Performs mobility at highest level of function for planned discharge setting.  See evaluation for individualized goals.  Description: FUNCTIONAL STATUS PRIOR TO ADMISSION: Patient was independent and active without use of DME.    HOME SUPPORT PRIOR TO ADMISSION: The patient lived with his significant other but did not require assistance.    Physical Therapy Goals  Initiated 3/26/2024  1.  Patient will move from supine to sit and sit to supine, scoot up and down, and roll side to side in bed with maximal assistance within 7 day(s).    2.  Patient will perform sit to stand with maximal assistance x2 within 7 day(s).  3.  Patient will transfer from bed to chair and chair to bed with maximal assistance x2 using the least restrictive device within 7 day(s).  4.  Patient will demo fair sitting balance at EOB x2min in prep for mobility progression within 7 days.  5.  Patient will improve Julio Balance score by 7 points within 7 days.   Outcome: Progressing   PHYSICAL THERAPY TREATMENT    Patient: Taj Clemens (63 y.o. male)  Date: 3/27/2024  Diagnosis: Cerebrovascular accident (CVA) due to occlusion of left middle cerebral artery (HCC) [I63.512]  Acute cerebrovascular accident (CVA) (HCC) [I63.9] Ischemic stroke (HCC)      Precautions:                        ASSESSMENT:  Patient continues to benefit from skilled PT services and is slowly progressing towards goals however remains most limited by expressive and receptive aphasia, dense R HP, decreased tolerance to activity, global weakness, L gaze and rotation preference, impaired balance, and inferred impaired gait leading to increased falls risk and dependency from baseline level.     Received pt supine in bed, alert. Continues to demo limited and inconsistent simple command following with multi modal cues. No consistent yes/no response. Facilitated rolling L<>R and supine<>sit EOB with max A x2 + one step cues to 
Problem: Physical Therapy - Adult  Goal: By Discharge: Performs mobility at highest level of function for planned discharge setting.  See evaluation for individualized goals.  Description: FUNCTIONAL STATUS PRIOR TO ADMISSION: Patient was independent and active without use of DME.    HOME SUPPORT PRIOR TO ADMISSION: The patient lived with his significant other but did not require assistance.    Physical Therapy Goals  Initiated 3/26/2024  1.  Patient will move from supine to sit and sit to supine, scoot up and down, and roll side to side in bed with maximal assistance within 7 day(s).    2.  Patient will perform sit to stand with maximal assistance x2 within 7 day(s).  3.  Patient will transfer from bed to chair and chair to bed with maximal assistance x2 using the least restrictive device within 7 day(s).  4.  Patient will demo fair sitting balance at EOB x2min in prep for mobility progression within 7 days.  5.  Patient will improve Julio Balance score by 7 points within 7 days.     PHYSICAL THERAPY TREATMENT    Patient: Taj Clemens (63 y.o. male)  Date: 3/28/2024  Diagnosis: Cerebrovascular accident (CVA) due to occlusion of left middle cerebral artery (HCC) [I63.512]  Acute cerebrovascular accident (CVA) (HCC) [I63.9] Ischemic stroke (HCC)      Precautions: Fall      ASSESSMENT:  Patient continues to benefit from skilled PT services and remains limited by receptive and expressive aphasia, R hemiparesis (no movement appreciated), poor sitting balance (strong persistent pushing toward the R), decreased activity tolerance, and overall decline in functional mobility.  Pt required max-totalA x2 for supine<>sit transfer.  Worked on achieving midline orientation - pt required constant assistance for balance d/t pushing.  Worked on lateral weight shifting midline<>forearm prop.  Pt nodded head inconsistently to questions - nods yes to everything.  Of note, pt with some wincing during RUE/RLE movement.  Wincing was 
:  Recommendations and Planned Interventions:  Diet: Puree and thin liquids with 1:1 assistance  -Upright during meals  -Alternating solids/liquids  -Small sips of thin liquids with cup  -Meds crushed in applesauce  -Vigilant oral care     Acute SLP Services: Yes, patient will be followed by speech-language pathology 4x/week to address goals. Patient's rehabilitation potential is considered to be Good.    Discharge Recommendations: Continue to assess pending progress     SUBJECTIVE:   Patient nodded when asked if he was ready to participate in bedside swallow evaluation.    OBJECTIVE:     Past Medical History:   Diagnosis Date    Allergic rhinitis 11/6/2017    Atrial fibrillation (HCC)     Constipation     Hemorrhoids     HTN (hypertension) 12/13/2012    Hypertension     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     Past Surgical History:   Procedure Laterality Date    HEMORRHOID SURGERY  1993     Prior Level of Function/Home Situation:   Social/Functional History  Lives With: Significant other  Type of Home: House  Home Layout: One level  Home Access: Stairs to enter without rails  Entrance Stairs - Number of Steps: 1  Entrance Stairs - Rails: None  Home Equipment: None  Has the patient had two or more falls in the past year or any fall with injury in the past year?: No  ADL Assistance: Independent  Homemaking Assistance: Independent  Ambulation Assistance: Independent  Transfer Assistance: Independent  Active : Yes  Mode of Transportation: Car    Hospital Course  3/24: Admitted for expressive-receptive aphasia. NIHSS 6 for month/age, open/close hand, best language, and dysarthria. CT showed subacute left temporoparietal MCA territory infarct. Left M2 occlusion with associated large left MCA territory perfusion defect. Not a candidate for TNK given unknown last known well.  Went to neurointerventional radiology TICI 1. Noted significant 
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     Past Surgical History:   Procedure Laterality Date    HEMORRHOID SURGERY  1993     Prior Level of Function/Home Situation:   Social/Functional History  Lives With: Significant other  Type of Home: House  Home Layout: One level  Home Access: Stairs to enter without rails  Entrance Stairs - Number of Steps: 1  Entrance Stairs - Rails: None  Home Equipment: None  Has the patient had two or more falls in the past year or any fall with injury in the past year?: No  ADL Assistance: Independent  Homemaking Assistance: Independent  Ambulation Assistance: Independent  Transfer Assistance: Independent  Active : Yes  Mode of Transportation: Car    Cognitive and Communication Status:  Neurologic State: Alert  Orientation Level: Unable to verbalize  Cognition: Decreased command following    Dysphagia:  Oral Assessment:  Oral Motor   Labial: Decreased seal  Dentition: Full;Intact  Oral Hygiene: Clean;Moist  Lingual: Other (comment) (unable to fully assess)  Velum: Unable to visualize  Mandible: No impairment  P.O. Trials:  PO Trials  Neuromuscular Estim Used: No  Assessment Method(s): Observation  Vocal Quality: Other (comment) (no vocalizations this session to allow for assessment)  Consistency Presented: Soft & Bite Sized;Pureed;Thin;Easy to chew  How Presented: SLP-fed/Presented;Self-fed/presented  Bolus Acceptance: No impairment  Bolus Formation/Control: Impaired  Type of Impairment: Anterior;Spillage;Mastication  Propulsion: perceived delay 1-2 seconds  Oral Residue: Less than 10% of bolus;Anterior  Initiation of Swallow: perceived delay 1-3 seconds  Laryngeal Elevation:  (palpable)  Aspiration Signs/Symptoms: Throat clear  Pharyngeal Phase Characteristics: Multiple swallows;Easily fatigued  Effective Modifications: Alternate liquids/solids  Cues for Modifications: Maximal       Language Comprehension 
basic wants/needs and will benefit from intensive SLP services both during acute hospitalization and at the next level of care.     Patient will benefit from skilled intervention to address the above impairments.     PLAN :  Recommendations and Planned Interventions:  Diet: Soft and bite sized and thin liquids  Alternate liquid/solid  Tray set up/assist as needed  - simple yes/no questions, multi-modal cueing for directions     Acute SLP Services: Yes, SLP will continue to follow per plan of care.    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     Past Surgical History:   Procedure Laterality Date    HEMORRHOID SURGERY  1993     Prior Level of Function/Home Situation:   Social/Functional History  Lives With: Significant other  Type of Home: House  Home Layout: One level  Home Access: Stairs to enter without rails  Entrance Stairs - Number of Steps: 1  Entrance Stairs - Rails: None  Home Equipment: None  Has the patient had two or more falls in the past year or any fall with injury in the past year?: No  ADL Assistance: Independent  Homemaking Assistance: Independent  Ambulation Assistance: Independent  Transfer Assistance: Independent  Active : Yes  Mode of Transportation: Car    Cognitive and Communication Status:  Neurologic State: Alert  Orientation Level: Unable to verbalize  Cognition: Decreased command following    Expressive Language   Automatic Speech Tasks: 0%    Receptive Language  yes/no: simple level with 40% accuracy  commands: one step commands: 0% despite max multimodal cueing    Dysphagia:  Oral 
injury  Outcome: Progressing     Problem: Chronic Conditions and Co-morbidities  Goal: Patient's chronic conditions and co-morbidity symptoms are monitored and maintained or improved  Outcome: Progressing  Flowsheets (Taken 3/25/2024 2000)  Care Plan - Patient's Chronic Conditions and Co-Morbidity Symptoms are Monitored and Maintained or Improved:   Monitor and assess patient's chronic conditions and comorbid symptoms for stability, deterioration, or improvement   Collaborate with multidisciplinary team to address chronic and comorbid conditions and prevent exacerbation or deterioration   Update acute care plan with appropriate goals if chronic or comorbid symptoms are exacerbated and prevent overall improvement and discharge     Problem: Skin/Tissue Integrity  Goal: Absence of new skin breakdown  Description: 1.  Monitor for areas of redness and/or skin breakdown  2.  Assess vascular access sites hourly  3.  Every 4-6 hours minimum:  Change oxygen saturation probe site  4.  Every 4-6 hours:  If on nasal continuous positive airway pressure, respiratory therapy assess nares and determine need for appliance change or resting period.  Outcome: Progressing     
supine, scoot up and down, and roll side to side in bed with maximal assistance within 7 day(s).    2.  Patient will perform sit to stand with maximal assistance x2 within 7 day(s).  3.  Patient will transfer from bed to chair and chair to bed with maximal assistance x2 using the least restrictive device within 7 day(s).  4.  Patient will demo fair sitting balance at EOB x2min in prep for mobility progression within 7 days.  5.  Patient will improve Julio Balance score by 7 points within 7 days.   3/28/2024 1437 by Ansuha Roberto, PT  Outcome: Not Progressing     Problem: Occupational Therapy - Adult  Goal: By Discharge: Performs self-care activities at highest level of function for planned discharge setting.  See evaluation for individualized goals.  Description: FUNCTIONAL STATUS PRIOR TO ADMISSION:        , ADL Assistance: Independent,  ,  ,  ,  ,  , Homemaking Assistance: Independent, Ambulation Assistance: Independent, Transfer Assistance: Independent, Active : Yes            HOME SUPPORT: Patient lived with significant other but didn't require assistance. 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  Patient will maintain alertness for 5 minutes with moderate verbal/visual/tactile stimuli in prep for ADL engagement within 7 days.  2.  Patient will follow >25% of commands within 7 day(s).  3.  Patient will tolerate sitting EOB in prep for ADL engagement for >2 minutes with Maximal assistance within 7 day(s).  4.  Patient will perform rolling R and L in prep for bed level toileting with moderate assistance within 7 day(s).  5.  Patient will participate in upper extremity therapeutic exercise/activities with Maximal Assist within 7 day(s).    6.  Patient will perform bed level grooming tasks with Moderate assistance within 7 days.  7.  Patient will complete Fugl Vera assessment within 7 
environment    IF patient discharges home will need the following DME: continuing to assess with progress     SUBJECTIVE:   Patient nonverbal throughout session.    OBJECTIVE DATA SUMMARY:     Cognitive/Behavioral Status:  Orientation  Overall Orientation Status: Impaired  Orientation Level: Unable to assess (inaccurate with yes/no questions)  Cognition  Overall Cognitive Status: Exceptions  Arousal/Alertness: Delayed responses to stimuli;Inconsistent responses to stimuli  Following Commands: Inconsistently follows commands;Follows one step commands with repetition;Follows one step commands with increased time  Problem Solving: Decreased awareness of errors  Insights: Not aware of deficits  Initiation: Requires cues for all  Sequencing: Requires cues for all    Functional Mobility and Transfers for ADLs:  Bed Mobility:  Bed Mobility Training  Bed Mobility Training: Yes  Rolling: Maximum assistance;Total assistance;Assist X2  Supine to Sit: Maximum assistance;Total assistance;Assist X2  Sit to Supine: Maximum assistance;Total assistance;Assist X2  Scooting: Total assistance;Assist X2     Transfers:   Transfer Training  Transfer Training: No      Balance:     Balance  Sitting: Impaired  Sitting - Static: Poor (constant support)  Sitting - Dynamic: Poor (constant support)      ADL Intervention:                    Lower Extremity Dressing: .  - Socks : Total Assistance and Bed Level                      Pain Rating:  Facial grimace with RUE ROM in supine, inconsistent facial grimace response noted throughout session     Pain Intervention(s):   elevation and repositioning    Activity Tolerance:   Poor, requires frequent rest breaks, and SpO2 stable on room air  Please refer to the flowsheet for vital signs taken during this treatment.    After treatment:   Patient left in no apparent distress in bed, Call bell within reach, Bed/ chair alarm activated, Caregiver / family present, and Side rails 
vital signs taken during this treatment.    After treatment:   Patient left in no apparent distress in bed, Call bell within reach, Bed/ chair alarm activated, and Side rails x3    COMMUNICATION/EDUCATION:   The patient's plan of care was discussed with: physical therapist and registered nurse    Patient Education  Education Given To: Patient;Family  Education Provided: Role of Therapy;Plan of Care;ADL Adaptive Strategies;Transfer Training;Fall Prevention Strategies;Family Education  Education Method: Verbal  Barriers to Learning: Cognition  Education Outcome: Unable to verbalize;Unable to demonstrate understanding;Continued education needed    Thank you for this referral.  Tri Samuels OTR/L  Minutes: 30

## 2024-03-28 NOTE — DISCHARGE SUMMARY
Discharge Summary       PATIENT ID: Taj Clemens  MRN: [id number]   YOB: 1960    DATE OF ADMISSION: 3/24/2024  2:45 AM    DATE OF DISCHARGE: 3/28/24   PRIMARY CARE PROVIDER: No primary care provider on file.     ATTENDING PHYSICIAN: alphonso smith  DISCHARGING PROVIDER: Alphonso Smith MD    To contact this individual call [phone] and ask the  to page.  If unavailable ask to be transferred the Adult Hospitalist Department.    CONSULTATIONS: IP CONSULT TO NEUROINTERVENTIONAL SURGERY  IP CONSULT TO NEUROLOGY  IP CONSULT TO CARDIOLOGY    PROCEDURES/SURGERIES: * No surgery found *    ADMITTING DIAGNOSES & HOSPITAL COURSE:  acute CVA    krish Clemens is a 63 y.o. male with a history of atrial fibrillation, HTN, and obesity who presented to [facility] ED after sister noticed confusion and garbled speech while on the telephone.  After ED physician evaluation patient with confusion and unable to speak coherently.  Per chart review and report, EKG was done that showed A-fib with RVR rate in the 200's.  Decision was made to sedate with etomidate and cardiovert with synchronized cardioversion, due to concern for lack of cerebral perfusion in setting of tachycardia and hypertension.  Cardioversion was successful per chart review.  Patient was then taken to CT department for scans.  CT head showed a left-sided MCA infarct.  ED physician had concern for aortic dissection thus a CTA of the chest abdomen and pelvis was done.  No dissection on report.  CTA of the head and neck was done after 4 and she had a large vessel occlusion in the left MCA.  Neurointerventional radiology was consulted and evaluated case and imaging and recommended patient be transferred over to [facility] for possible uncal for thrombectomy.  Patient was transferred ED to ED and then went to angio for procedure.  After arrival to [facility] patient was agitated combative requiring multiple staff to keep in bed.  General anesthesia

## 2024-03-28 NOTE — CARE COORDINATION
Transition of Care Plan: anticipate d/c to [facility], report #[phone];  Accepting Dr. Shahrzad Marie scheduled for 5:30-6pm with Delta    RUR: 16% Moderate   Prior Level of Functioning: Independent   Disposition: IPR-Referrals sent to [facility] and [facility]  Follow up appointments: PCP, Neuro   DME needed: TBD  Transportation at discharge: Family vs BLS  IM/IMM Medicare/ letter given: No  Is patient a  and connected with VA? No  Caregiver Contact: Romi Ovalles  [phone]  Discharge Caregiver contacted prior to discharge? No  Care Conference needed? No  Barriers to discharge:  IPR bed  -1100-CM reviewed pt chart and discussed pt in rounds. Per report, pt is stable for d/c to IPR. Cm spoke with Elisabeth of [facility] [phone] and she wants to see therapy and will check bed availability for today. CM also spoke with Carolyne Heard, [facility] IPR liaision [phone] and she stated that their Physician wants to see how the patient does with therapy today and would let CM know if they can accept. CM to follow.  1530-CM spoke with Romi covington and she advised she needs to discuss [facility] with family before making a decision on d/c, Attending informed.  1600-Cm spoke with nadya and she is agreeable to d/c plan. CM set up d/c and informed Crystal of [facility] IPR and nurse of plan.  Sylvia Enriquez RN BSN CCM

## 2024-03-29 NOTE — TELEPHONE ENCOUNTER
Left message with  at [facility].     Future Appointments   Date Time Provider Department Center   5/9/2024  1:00 PM [facility] ECHO 2 [facility]   5/9/2024  2:00 PM Viridiana Gillis MD [facility] AMB

## 2024-03-31 LAB
BACTERIA SPEC CULT: NORMAL
BACTERIA SPEC CULT: NORMAL
SERVICE CMNT-IMP: NORMAL
SERVICE CMNT-IMP: NORMAL

## 2024-05-09 ENCOUNTER — ANCILLARY PROCEDURE (OUTPATIENT)
Age: 64
End: 2024-05-09
Payer: MEDICARE

## 2024-05-09 ENCOUNTER — OFFICE VISIT (OUTPATIENT)
Age: 64
End: 2024-05-09
Payer: MEDICARE

## 2024-05-09 VITALS
HEART RATE: 86 BPM | SYSTOLIC BLOOD PRESSURE: 126 MMHG | HEIGHT: 72 IN | DIASTOLIC BLOOD PRESSURE: 82 MMHG | OXYGEN SATURATION: 99 % | BODY MASS INDEX: 38.21 KG/M2

## 2024-05-09 VITALS — HEIGHT: 72 IN | WEIGHT: 255 LBS | BODY MASS INDEX: 34.54 KG/M2

## 2024-05-09 DIAGNOSIS — Z09 HOSPITAL DISCHARGE FOLLOW-UP: ICD-10-CM

## 2024-05-09 DIAGNOSIS — R94.30 CARDIAC LV EJECTION FRACTION 21-30%: ICD-10-CM

## 2024-05-09 DIAGNOSIS — I10 ESSENTIAL HYPERTENSION: ICD-10-CM

## 2024-05-09 DIAGNOSIS — I48.3 TYPICAL ATRIAL FLUTTER (HCC): Primary | ICD-10-CM

## 2024-05-09 DIAGNOSIS — I48.91 ATRIAL FIBRILLATION (HCC): ICD-10-CM

## 2024-05-09 DIAGNOSIS — I63.412 ACUTE STROKE DUE TO EMBOLISM OF LEFT MIDDLE CEREBRAL ARTERY (HCC): ICD-10-CM

## 2024-05-09 DIAGNOSIS — I50.22 CHRONIC SYSTOLIC CONGESTIVE HEART FAILURE (HCC): ICD-10-CM

## 2024-05-09 DIAGNOSIS — I67.9 CEREBRAL VASCULAR DISEASE: ICD-10-CM

## 2024-05-09 PROCEDURE — 3074F SYST BP LT 130 MM HG: CPT | Performed by: SPECIALIST

## 2024-05-09 PROCEDURE — G8417 CALC BMI ABV UP PARAM F/U: HCPCS | Performed by: SPECIALIST

## 2024-05-09 PROCEDURE — 93306 TTE W/DOPPLER COMPLETE: CPT | Performed by: SPECIALIST

## 2024-05-09 PROCEDURE — G8427 DOCREV CUR MEDS BY ELIG CLIN: HCPCS | Performed by: SPECIALIST

## 2024-05-09 PROCEDURE — 99214 OFFICE O/P EST MOD 30 MIN: CPT | Performed by: SPECIALIST

## 2024-05-09 PROCEDURE — 93005 ELECTROCARDIOGRAM TRACING: CPT | Performed by: SPECIALIST

## 2024-05-09 PROCEDURE — 3017F COLORECTAL CA SCREEN DOC REV: CPT | Performed by: SPECIALIST

## 2024-05-09 PROCEDURE — 3079F DIAST BP 80-89 MM HG: CPT | Performed by: SPECIALIST

## 2024-05-09 PROCEDURE — 1123F ACP DISCUSS/DSCN MKR DOCD: CPT | Performed by: SPECIALIST

## 2024-05-09 PROCEDURE — 93010 ELECTROCARDIOGRAM REPORT: CPT | Performed by: SPECIALIST

## 2024-05-09 PROCEDURE — 1111F DSCHRG MED/CURRENT MED MERGE: CPT | Performed by: SPECIALIST

## 2024-05-09 PROCEDURE — 1036F TOBACCO NON-USER: CPT | Performed by: SPECIALIST

## 2024-05-09 RX ORDER — LANOLIN ALCOHOL/MO/W.PET/CERES
6 CREAM (GRAM) TOPICAL NIGHTLY
COMMUNITY

## 2024-05-09 RX ORDER — AMIODARONE HYDROCHLORIDE 200 MG/1
200 TABLET ORAL DAILY
Qty: 90 TABLET | Refills: 3
Start: 2024-05-09

## 2024-05-09 RX ORDER — GABAPENTIN 100 MG/1
300 CAPSULE ORAL 3 TIMES DAILY
COMMUNITY

## 2024-05-09 RX ORDER — TAMSULOSIN HYDROCHLORIDE 0.4 MG/1
0.4 CAPSULE ORAL
COMMUNITY

## 2024-05-09 RX ORDER — FINASTERIDE 5 MG/1
5 TABLET, FILM COATED ORAL DAILY
COMMUNITY

## 2024-05-09 RX ORDER — SACUBITRIL AND VALSARTAN 24; 26 MG/1; MG/1
1 TABLET, FILM COATED ORAL 2 TIMES DAILY
COMMUNITY

## 2024-05-09 ASSESSMENT — PATIENT HEALTH QUESTIONNAIRE - PHQ9
SUM OF ALL RESPONSES TO PHQ9 QUESTIONS 1 & 2: 0
SUM OF ALL RESPONSES TO PHQ QUESTIONS 1-9: 0
2. FEELING DOWN, DEPRESSED OR HOPELESS: NOT AT ALL
SUM OF ALL RESPONSES TO PHQ QUESTIONS 1-9: 0
1. LITTLE INTEREST OR PLEASURE IN DOING THINGS: NOT AT ALL

## 2024-05-09 NOTE — PROGRESS NOTES
Hypertension in his mother.   Allergies   Allergen Reactions    Penicillin G Itching    Sulfa Antibiotics Rash        Exam and Labs:  /82 (Site: Left Upper Arm, Position: Sitting, Cuff Size: Large Adult)   Pulse 86   Ht 1.829 m (6')   SpO2 99%   BMI 34.58 kg/m²    @Constitutional:  NAD, comfortable  Head: NC,AT. Eyes: No scleral icterus. Neck:  Neck supple. No JVD present.Throat: moist mucous membranes.  Chest: Effort normal & normal respiratory excursion .Neurological: alert, conversant and oriented . Skin: Skin is not cold. No obvious systemic rash noted. Not diaphoretic. No erythema.   Psychiatric:  Grossly normal mood and affect.  Behavior appears normal.   Extremities:  no clubbing or cyanosis. Abdomen: non distended    Lungs:breath sounds normal. No stridor. distress, wheezes or  Rales.  Heart:    normal rate, regular rhythm, normal S1, S2, no murmurs, rubs, clicks or gallops , PMI non displaced.    Edema: Edema is none.      Lab Results   Component Value Date    CHOL 109 03/24/2024    CHOL 154 10/05/2021     Lab Results   Component Value Date    TRIG 92 03/24/2024    TRIG 75 10/05/2021     Lab Results   Component Value Date    HDL 31 03/24/2024    HDL 44 10/05/2021     No components found for: \"LDLCHOLESTEROL\", \"LDLCALC\"  Lab Results   Component Value Date    VLDL 18.4 03/24/2024    VLDL 15 10/05/2021     Lab Results   Component Value Date    CHOLHDLRATIO 3.5 03/24/2024      Lab Results   Component Value Date/Time     03/28/2024 01:05 AM    K 3.5 03/28/2024 01:05 AM     03/28/2024 01:05 AM    CO2 21 03/28/2024 01:05 AM    BUN 11 03/28/2024 01:05 AM    CREATININE 0.92 03/28/2024 01:05 AM    GLUCOSE 103 03/28/2024 01:05 AM    CALCIUM 8.4 03/28/2024 01:05 AM    LABGLOM >90 03/28/2024 01:05 AM       Wt Readings from Last 3 Encounters:   05/09/24 115.7 kg (255 lb)   03/28/24 127.8 kg (281 lb 12 oz)   03/24/24 123.7 kg (272 lb 11.3 oz)      BP Readings from Last 3 Encounters:   05/09/24 126/82

## 2024-05-09 NOTE — PATIENT INSTRUCTIONS
Call [facility] at [phone] for a new patient appointment with PCP.    Have labs completed at facility.    We will see you back in about 2 months.    Occupational Therapy Visit    Visit Type: Daily Treatment Note  Visit: 5  Referring Provider: Bennie Campos MD  Next referring provider visit: 10/19/2023  Medical Diagnosis (from order): Diagnosis Information    Diagnosis  Z98.890 (ICD-10-CM) - S/P arthroscopy of shoulder       Patient alert and oriented X3.    SUBJECTIVE                                                                                                               Agree to observer: jonathon; family member.    Patient arrived without immobilizer  Functional Change: Showering is easier  Increasing sleep pattern     Pain / Symptoms  - Pain rating (out of 10): Current: 0      OBJECTIVE                                                                                                                                        Treatment     Therapeutic Exercise  Supine   PROM scaption x10 reps x 3 sets   Prom EXTERAL ROTATION x 10 rep x 3 sets   PROM INTERAL ROTATION x 10 reps x 3 sets   PROM elbow flexion/extension x10 reps x 2 sets  PROM supination/pronation x10 reps x 2 sets     Answered patient's questions relating to diagnosis. Reviewed and reinforced HEP. Patient verbalized understanding and reports no questions at time of session.       Manual Therapy   Manual soft tissue manipulation to upper arm, pectoralis, teres minor/major, infraspinatus, supraspinatus and upper trap to decrease hypertonicity. Intermittent soft tissue manipulation to upper arm during P R O M    Supine:  Glides to lateral border of scapula to promote motion    Skilled input: verbal instruction/cues and tactile instruction/cues    Writer verbally educated and received verbal consent for hand placement, positioning of patient, and techniques to be performed today from patient for hand placement and palpation for techniques and therapist position for techniques as described above and how they are pertinent to the patient's plan of care.  Home Exercise Program  Access Code: [email or web address]  URL:  [email or web address]  Date: 09/11/2023  Prepared by: Rema Armenta    Exercises  - Seated Scapular Retraction  - 1 x daily - 7 x weekly - 3 sets - 10 reps  - Wrist AROM Flexion Extension  - 1 x daily - 7 x weekly - 3 sets - 10 reps  - Wrist Tendon Gliding  - 1 x daily - 7 x weekly - 3 sets - 10 reps      ASSESSMENT                                                                                                            4+ weeks post op  Patient is progressing favorably and notes increasing ease with ADLs. He arrives to therapy without immobilizer donned. Session is guided per MD post op protocol parameters. Allen is visually exceeding expectations for current time frame with P R O M. Patient remains being seen 1 time per week due to positive response to therapy; of note patient will be re-assessed at next session for upcoming follow up with Dr Campos.      Patient continues to benefit from skilled Occupational Therapy to maximize active range of motion, strength, functional use and activity tolerance, decrease pain, increase scar tissue mobility, decrease swelling/edema, increase scapular mechanics and improve muscle coordination/proprioception to return to independence with ADLs and IADLs.      Pain/symptoms after session (out of 10): 0  Education:   - Results of above outlined education: Verbalizes understanding and Demonstrates understanding    PLAN                                                                                                                           Suggestions for next session as indicated: Progress per plan of care  **REASSESS  progress per protocol       Therapy procedure time and total treatment time can be found documented on the Time Entry flowsheet

## 2024-05-17 LAB
ECHO AO ASC DIAM: 3.9 CM
ECHO AO ASCENDING AORTA INDEX: 1.65 CM/M2
ECHO AO ROOT DIAM: 4.1 CM
ECHO AO ROOT INDEX: 1.74 CM/M2
ECHO AV AREA PEAK VELOCITY: 1.7 CM2
ECHO AV AREA VTI: 1.8 CM2
ECHO AV AREA/BSA PEAK VELOCITY: 0.7 CM2/M2
ECHO AV AREA/BSA VTI: 0.8 CM2/M2
ECHO AV MEAN GRADIENT: 1 MMHG
ECHO AV MEAN VELOCITY: 0.6 M/S
ECHO AV PEAK GRADIENT: 2 MMHG
ECHO AV PEAK VELOCITY: 0.8 M/S
ECHO AV VELOCITY RATIO: 0.38
ECHO AV VTI: 10.7 CM
ECHO BSA: 2.42 M2
ECHO LA DIAMETER INDEX: 1.36 CM/M2
ECHO LA DIAMETER: 3.2 CM
ECHO LA TO AORTIC ROOT RATIO: 0.78
ECHO LV FRACTIONAL SHORTENING: 32 % (ref 28–44)
ECHO LV INTERNAL DIMENSION DIASTOLE INDEX: 1.44 CM/M2
ECHO LV INTERNAL DIMENSION DIASTOLIC MMODE: 4.5 CM (ref 4.2–5.9)
ECHO LV INTERNAL DIMENSION DIASTOLIC: 3.4 CM (ref 4.2–5.9)
ECHO LV INTERNAL DIMENSION SYSTOLIC INDEX: 0.97 CM/M2
ECHO LV INTERNAL DIMENSION SYSTOLIC MMODE: 2.1 CM
ECHO LV INTERNAL DIMENSION SYSTOLIC: 2.3 CM
ECHO LV IVSD MMODE: 1.2 CM (ref 0.6–1)
ECHO LV IVSD: 1.8 CM (ref 0.6–1)
ECHO LV IVSS MMODE: 1.5 CM
ECHO LV MASS 2D: 229.5 G (ref 88–224)
ECHO LV MASS INDEX 2D: 97.2 G/M2 (ref 49–115)
ECHO LV POSTERIOR WALL DIASTOLIC MMODE: 1.7 CM (ref 0.6–1)
ECHO LV POSTERIOR WALL DIASTOLIC: 1.6 CM (ref 0.6–1)
ECHO LV POSTERIOR WALL SYSTOLIC MMODE: 2.4 CM
ECHO LV RELATIVE WALL THICKNESS RATIO: 0.94
ECHO LVOT AREA: 3.8 CM2
ECHO LVOT AV VTI INDEX: 0.46
ECHO LVOT DIAM: 2.2 CM
ECHO LVOT MEAN GRADIENT: 0 MMHG
ECHO LVOT PEAK GRADIENT: 0 MMHG
ECHO LVOT PEAK VELOCITY: 0.3 M/S
ECHO LVOT STROKE VOLUME INDEX: 7.9 ML/M2
ECHO LVOT SV: 18.6 ML
ECHO LVOT VTI: 4.9 CM
ECHO PV MAX VELOCITY: 0.6 M/S
ECHO PV PEAK GRADIENT: 1 MMHG

## 2024-05-17 PROCEDURE — 93306 TTE W/DOPPLER COMPLETE: CPT | Performed by: SPECIALIST

## 2024-05-26 ENCOUNTER — HOSPITAL ENCOUNTER (INPATIENT)
Facility: HOSPITAL | Age: 64
LOS: 1 days | Discharge: NURSING, REHAB OR LONG-TERM CARE | DRG: 699 | End: 2024-05-29
Attending: STUDENT IN AN ORGANIZED HEALTH CARE EDUCATION/TRAINING PROGRAM | Admitting: HOSPITALIST
Payer: MEDICARE

## 2024-05-26 DIAGNOSIS — R55 SYNCOPE, UNSPECIFIED SYNCOPE TYPE: ICD-10-CM

## 2024-05-26 DIAGNOSIS — N39.0 URINARY TRACT INFECTION ASSOCIATED WITH INDWELLING URETHRAL CATHETER, INITIAL ENCOUNTER (HCC): ICD-10-CM

## 2024-05-26 DIAGNOSIS — R55 SYNCOPE AND COLLAPSE: Primary | ICD-10-CM

## 2024-05-26 DIAGNOSIS — T83.511A URINARY TRACT INFECTION ASSOCIATED WITH INDWELLING URETHRAL CATHETER, INITIAL ENCOUNTER (HCC): ICD-10-CM

## 2024-05-26 LAB
ALBUMIN SERPL-MCNC: 3.6 G/DL (ref 3.5–5)
ALBUMIN/GLOB SERPL: 0.8 (ref 1.1–2.2)
ALP SERPL-CCNC: 77 U/L (ref 45–117)
ALT SERPL-CCNC: 50 U/L (ref 12–78)
ANION GAP SERPL CALC-SCNC: 2 MMOL/L (ref 5–15)
AST SERPL-CCNC: 27 U/L (ref 15–37)
BASOPHILS # BLD: 0.1 K/UL (ref 0–0.1)
BASOPHILS NFR BLD: 1 % (ref 0–1)
BILIRUB SERPL-MCNC: 0.9 MG/DL (ref 0.2–1)
BUN SERPL-MCNC: 12 MG/DL (ref 6–20)
BUN/CREAT SERPL: 11 (ref 12–20)
CALCIUM SERPL-MCNC: 9.9 MG/DL (ref 8.5–10.1)
CHLORIDE SERPL-SCNC: 105 MMOL/L (ref 97–108)
CO2 SERPL-SCNC: 30 MMOL/L (ref 21–32)
COMMENT:: NORMAL
CREAT SERPL-MCNC: 1.13 MG/DL (ref 0.7–1.3)
DIFFERENTIAL METHOD BLD: ABNORMAL
EOSINOPHIL # BLD: 0.2 K/UL (ref 0–0.4)
EOSINOPHIL NFR BLD: 4 % (ref 0–7)
ERYTHROCYTE [DISTWIDTH] IN BLOOD BY AUTOMATED COUNT: 16 % (ref 11.5–14.5)
GLOBULIN SER CALC-MCNC: 4.3 G/DL (ref 2–4)
GLUCOSE SERPL-MCNC: 107 MG/DL (ref 65–100)
HCT VFR BLD AUTO: 39.7 % (ref 36.6–50.3)
HGB BLD-MCNC: 13.2 G/DL (ref 12.1–17)
IMM GRANULOCYTES # BLD AUTO: 0 K/UL (ref 0–0.04)
IMM GRANULOCYTES NFR BLD AUTO: 0 % (ref 0–0.5)
LYMPHOCYTES # BLD: 2.3 K/UL (ref 0.8–3.5)
LYMPHOCYTES NFR BLD: 38 % (ref 12–49)
MCH RBC QN AUTO: 30.8 PG (ref 26–34)
MCHC RBC AUTO-ENTMCNC: 33.2 G/DL (ref 30–36.5)
MCV RBC AUTO: 92.8 FL (ref 80–99)
MONOCYTES # BLD: 0.5 K/UL (ref 0–1)
MONOCYTES NFR BLD: 8 % (ref 5–13)
NEUTS SEG # BLD: 3 K/UL (ref 1.8–8)
NEUTS SEG NFR BLD: 49 % (ref 32–75)
NRBC # BLD: 0 K/UL (ref 0–0.01)
NRBC BLD-RTO: 0 PER 100 WBC
PLATELET # BLD AUTO: 363 K/UL (ref 150–400)
PMV BLD AUTO: 9.6 FL (ref 8.9–12.9)
POTASSIUM SERPL-SCNC: 3.4 MMOL/L (ref 3.5–5.1)
PROT SERPL-MCNC: 7.9 G/DL (ref 6.4–8.2)
RBC # BLD AUTO: 4.28 M/UL (ref 4.1–5.7)
SODIUM SERPL-SCNC: 137 MMOL/L (ref 136–145)
SPECIMEN HOLD: NORMAL
WBC # BLD AUTO: 6.1 K/UL (ref 4.1–11.1)

## 2024-05-26 PROCEDURE — 93005 ELECTROCARDIOGRAM TRACING: CPT | Performed by: STUDENT IN AN ORGANIZED HEALTH CARE EDUCATION/TRAINING PROGRAM

## 2024-05-26 PROCEDURE — 99285 EMERGENCY DEPT VISIT HI MDM: CPT

## 2024-05-26 PROCEDURE — 80053 COMPREHEN METABOLIC PANEL: CPT

## 2024-05-26 PROCEDURE — 96374 THER/PROPH/DIAG INJ IV PUSH: CPT

## 2024-05-26 PROCEDURE — 36415 COLL VENOUS BLD VENIPUNCTURE: CPT

## 2024-05-26 PROCEDURE — 87186 SC STD MICRODIL/AGAR DIL: CPT

## 2024-05-26 PROCEDURE — 85025 COMPLETE CBC W/AUTO DIFF WBC: CPT

## 2024-05-26 PROCEDURE — 87086 URINE CULTURE/COLONY COUNT: CPT

## 2024-05-26 PROCEDURE — 81001 URINALYSIS AUTO W/SCOPE: CPT

## 2024-05-26 PROCEDURE — 87077 CULTURE AEROBIC IDENTIFY: CPT

## 2024-05-26 ASSESSMENT — PAIN - FUNCTIONAL ASSESSMENT: PAIN_FUNCTIONAL_ASSESSMENT: NONE - DENIES PAIN

## 2024-05-27 ENCOUNTER — APPOINTMENT (OUTPATIENT)
Facility: HOSPITAL | Age: 64
DRG: 699 | End: 2024-05-27
Payer: MEDICARE

## 2024-05-27 PROBLEM — R55 SYNCOPE AND COLLAPSE: Status: ACTIVE | Noted: 2024-05-27

## 2024-05-27 LAB
AMORPH CRY URNS QL MICRO: ABNORMAL
APPEARANCE UR: ABNORMAL
BACTERIA URNS QL MICRO: ABNORMAL /HPF
BILIRUB UR QL CFM: NEGATIVE
CAOX CRY URNS QL MICRO: ABNORMAL
COLOR UR: ABNORMAL
EKG ATRIAL RATE: 75 BPM
EKG DIAGNOSIS: NORMAL
EKG P AXIS: 34 DEGREES
EKG P-R INTERVAL: 144 MS
EKG Q-T INTERVAL: 382 MS
EKG QRS DURATION: 86 MS
EKG QTC CALCULATION (BAZETT): 426 MS
EKG R AXIS: 12 DEGREES
EKG T AXIS: 63 DEGREES
EKG VENTRICULAR RATE: 75 BPM
EPITH CASTS URNS QL MICRO: ABNORMAL /LPF
GLUCOSE UR STRIP.AUTO-MCNC: NEGATIVE MG/DL
HGB UR QL STRIP: ABNORMAL
KETONES UR QL STRIP.AUTO: NEGATIVE MG/DL
LEUKOCYTE ESTERASE UR QL STRIP.AUTO: ABNORMAL
NITRITE UR QL STRIP.AUTO: POSITIVE
PH UR STRIP: 6 (ref 5–8)
PROT UR STRIP-MCNC: >300 MG/DL
RBC #/AREA URNS HPF: ABNORMAL /HPF (ref 0–5)
SP GR UR REFRACTOMETRY: >1.03 (ref 1–1.03)
UROBILINOGEN UR QL STRIP.AUTO: 1 EU/DL (ref 0.2–1)
WBC URNS QL MICRO: ABNORMAL /HPF (ref 0–4)

## 2024-05-27 PROCEDURE — 2580000003 HC RX 258: Performed by: STUDENT IN AN ORGANIZED HEALTH CARE EDUCATION/TRAINING PROGRAM

## 2024-05-27 PROCEDURE — 6370000000 HC RX 637 (ALT 250 FOR IP): Performed by: HOSPITALIST

## 2024-05-27 PROCEDURE — G0378 HOSPITAL OBSERVATION PER HR: HCPCS

## 2024-05-27 PROCEDURE — 96361 HYDRATE IV INFUSION ADD-ON: CPT

## 2024-05-27 PROCEDURE — 6360000002 HC RX W HCPCS: Performed by: STUDENT IN AN ORGANIZED HEALTH CARE EDUCATION/TRAINING PROGRAM

## 2024-05-27 PROCEDURE — 93010 ELECTROCARDIOGRAM REPORT: CPT | Performed by: INTERNAL MEDICINE

## 2024-05-27 PROCEDURE — 73501 X-RAY EXAM HIP UNI 1 VIEW: CPT

## 2024-05-27 PROCEDURE — 6370000000 HC RX 637 (ALT 250 FOR IP): Performed by: STUDENT IN AN ORGANIZED HEALTH CARE EDUCATION/TRAINING PROGRAM

## 2024-05-27 PROCEDURE — 96374 THER/PROPH/DIAG INJ IV PUSH: CPT

## 2024-05-27 RX ORDER — HYDROCODONE BITARTRATE AND ACETAMINOPHEN 5; 325 MG/1; MG/1
1 TABLET ORAL EVERY 6 HOURS PRN
Status: DISCONTINUED | OUTPATIENT
Start: 2024-05-27 | End: 2024-05-29 | Stop reason: HOSPADM

## 2024-05-27 RX ORDER — GABAPENTIN 300 MG/1
300 CAPSULE ORAL 3 TIMES DAILY
Status: DISCONTINUED | OUTPATIENT
Start: 2024-05-27 | End: 2024-05-29 | Stop reason: HOSPADM

## 2024-05-27 RX ORDER — MIRTAZAPINE 15 MG/1
15 TABLET, FILM COATED ORAL NIGHTLY
Status: DISCONTINUED | OUTPATIENT
Start: 2024-05-27 | End: 2024-05-29 | Stop reason: HOSPADM

## 2024-05-27 RX ORDER — LIDOCAINE 4 G/G
1 PATCH TOPICAL DAILY
COMMUNITY

## 2024-05-27 RX ORDER — LANOLIN ALCOHOL/MO/W.PET/CERES
6 CREAM (GRAM) TOPICAL NIGHTLY
Status: DISCONTINUED | OUTPATIENT
Start: 2024-05-27 | End: 2024-05-29 | Stop reason: HOSPADM

## 2024-05-27 RX ORDER — BACLOFEN 10 MG/1
10 TABLET ORAL 3 TIMES DAILY
Status: DISCONTINUED | OUTPATIENT
Start: 2024-05-27 | End: 2024-05-29 | Stop reason: HOSPADM

## 2024-05-27 RX ORDER — MAGNESIUM SULFATE IN WATER 40 MG/ML
2000 INJECTION, SOLUTION INTRAVENOUS PRN
Status: DISCONTINUED | OUTPATIENT
Start: 2024-05-27 | End: 2024-05-29 | Stop reason: HOSPADM

## 2024-05-27 RX ORDER — SODIUM CHLORIDE 0.9 % (FLUSH) 0.9 %
5-40 SYRINGE (ML) INJECTION PRN
Status: DISCONTINUED | OUTPATIENT
Start: 2024-05-27 | End: 2024-05-29 | Stop reason: HOSPADM

## 2024-05-27 RX ORDER — ACETAMINOPHEN 325 MG/1
650 TABLET ORAL EVERY 6 HOURS PRN
Status: DISCONTINUED | OUTPATIENT
Start: 2024-05-27 | End: 2024-05-29 | Stop reason: HOSPADM

## 2024-05-27 RX ORDER — TAMSULOSIN HYDROCHLORIDE 0.4 MG/1
0.4 CAPSULE ORAL
Status: DISCONTINUED | OUTPATIENT
Start: 2024-05-27 | End: 2024-05-29 | Stop reason: HOSPADM

## 2024-05-27 RX ORDER — ACETAMINOPHEN 650 MG/1
650 SUPPOSITORY RECTAL EVERY 6 HOURS PRN
Status: DISCONTINUED | OUTPATIENT
Start: 2024-05-27 | End: 2024-05-29 | Stop reason: HOSPADM

## 2024-05-27 RX ORDER — BACLOFEN 10 MG/1
10 TABLET ORAL 3 TIMES DAILY
COMMUNITY

## 2024-05-27 RX ORDER — LIDOCAINE 4 G/G
1 PATCH TOPICAL DAILY
Status: DISCONTINUED | OUTPATIENT
Start: 2024-05-27 | End: 2024-05-29 | Stop reason: HOSPADM

## 2024-05-27 RX ORDER — TRAMADOL HYDROCHLORIDE 50 MG/1
50 TABLET ORAL DAILY PRN
Status: ON HOLD | COMMUNITY
End: 2024-05-29 | Stop reason: HOSPADM

## 2024-05-27 RX ORDER — AMIODARONE HYDROCHLORIDE 200 MG/1
200 TABLET ORAL DAILY
Status: DISCONTINUED | OUTPATIENT
Start: 2024-05-27 | End: 2024-05-29 | Stop reason: HOSPADM

## 2024-05-27 RX ORDER — ATORVASTATIN CALCIUM 40 MG/1
40 TABLET, FILM COATED ORAL NIGHTLY
Status: DISCONTINUED | OUTPATIENT
Start: 2024-05-27 | End: 2024-05-29 | Stop reason: HOSPADM

## 2024-05-27 RX ORDER — POTASSIUM CHLORIDE 750 MG/1
40 TABLET, FILM COATED, EXTENDED RELEASE ORAL PRN
Status: DISCONTINUED | OUTPATIENT
Start: 2024-05-27 | End: 2024-05-29 | Stop reason: HOSPADM

## 2024-05-27 RX ORDER — ASPIRIN 81 MG/1
81 TABLET, CHEWABLE ORAL DAILY
Status: DISCONTINUED | OUTPATIENT
Start: 2024-05-27 | End: 2024-05-29 | Stop reason: HOSPADM

## 2024-05-27 RX ORDER — SODIUM CHLORIDE 0.9 % (FLUSH) 0.9 %
5-40 SYRINGE (ML) INJECTION EVERY 12 HOURS SCHEDULED
Status: DISCONTINUED | OUTPATIENT
Start: 2024-05-27 | End: 2024-05-29 | Stop reason: HOSPADM

## 2024-05-27 RX ORDER — FINASTERIDE 5 MG/1
5 TABLET, FILM COATED ORAL DAILY
Status: DISCONTINUED | OUTPATIENT
Start: 2024-05-27 | End: 2024-05-29 | Stop reason: HOSPADM

## 2024-05-27 RX ORDER — ACETAMINOPHEN 500 MG
1000 TABLET ORAL 2 TIMES DAILY
COMMUNITY

## 2024-05-27 RX ORDER — POLYETHYLENE GLYCOL 3350 17 G/17G
17 POWDER, FOR SOLUTION ORAL DAILY PRN
Status: DISCONTINUED | OUTPATIENT
Start: 2024-05-27 | End: 2024-05-29 | Stop reason: HOSPADM

## 2024-05-27 RX ORDER — TRAMADOL HYDROCHLORIDE 50 MG/1
50 TABLET ORAL 2 TIMES DAILY PRN
Status: DISCONTINUED | OUTPATIENT
Start: 2024-05-27 | End: 2024-05-27

## 2024-05-27 RX ORDER — POLYETHYLENE GLYCOL 3350 17 G/17G
17 POWDER, FOR SOLUTION ORAL 2 TIMES DAILY PRN
COMMUNITY

## 2024-05-27 RX ORDER — BACLOFEN 10 MG/1
5 TABLET ORAL 3 TIMES DAILY
Status: DISCONTINUED | OUTPATIENT
Start: 2024-05-27 | End: 2024-05-27

## 2024-05-27 RX ORDER — ACETAMINOPHEN 325 MG/1
650 TABLET ORAL EVERY 6 HOURS PRN
COMMUNITY

## 2024-05-27 RX ORDER — POTASSIUM CHLORIDE 7.45 MG/ML
10 INJECTION INTRAVENOUS PRN
Status: DISCONTINUED | OUTPATIENT
Start: 2024-05-27 | End: 2024-05-29 | Stop reason: HOSPADM

## 2024-05-27 RX ORDER — 0.9 % SODIUM CHLORIDE 0.9 %
1000 INTRAVENOUS SOLUTION INTRAVENOUS ONCE
Status: COMPLETED | OUTPATIENT
Start: 2024-05-27 | End: 2024-05-27

## 2024-05-27 RX ORDER — ONDANSETRON 2 MG/ML
4 INJECTION INTRAMUSCULAR; INTRAVENOUS EVERY 6 HOURS PRN
Status: DISCONTINUED | OUTPATIENT
Start: 2024-05-27 | End: 2024-05-29 | Stop reason: HOSPADM

## 2024-05-27 RX ORDER — ONDANSETRON 4 MG/1
4 TABLET, ORALLY DISINTEGRATING ORAL EVERY 8 HOURS PRN
Status: DISCONTINUED | OUTPATIENT
Start: 2024-05-27 | End: 2024-05-29 | Stop reason: HOSPADM

## 2024-05-27 RX ORDER — AMOXICILLIN 250 MG
1 CAPSULE ORAL NIGHTLY
COMMUNITY

## 2024-05-27 RX ORDER — ASCORBIC ACID 500 MG
500 TABLET ORAL 2 TIMES DAILY
COMMUNITY

## 2024-05-27 RX ORDER — SENNA AND DOCUSATE SODIUM 50; 8.6 MG/1; MG/1
1 TABLET, FILM COATED ORAL NIGHTLY
Status: DISCONTINUED | OUTPATIENT
Start: 2024-05-27 | End: 2024-05-29 | Stop reason: HOSPADM

## 2024-05-27 RX ORDER — MIRTAZAPINE 15 MG/1
15 TABLET, FILM COATED ORAL NIGHTLY
COMMUNITY

## 2024-05-27 RX ORDER — BACLOFEN 5 MG/1
5 TABLET ORAL 3 TIMES DAILY
Status: ON HOLD | COMMUNITY
End: 2024-05-29 | Stop reason: HOSPADM

## 2024-05-27 RX ORDER — POTASSIUM CHLORIDE 750 MG/1
40 TABLET, FILM COATED, EXTENDED RELEASE ORAL ONCE
Status: DISCONTINUED | OUTPATIENT
Start: 2024-05-27 | End: 2024-05-29 | Stop reason: HOSPADM

## 2024-05-27 RX ORDER — BACLOFEN 20 MG/1
20 TABLET ORAL 3 TIMES DAILY
Status: ON HOLD | COMMUNITY
End: 2024-05-29 | Stop reason: HOSPADM

## 2024-05-27 RX ORDER — SODIUM CHLORIDE 9 MG/ML
INJECTION, SOLUTION INTRAVENOUS PRN
Status: DISCONTINUED | OUTPATIENT
Start: 2024-05-27 | End: 2024-05-29 | Stop reason: HOSPADM

## 2024-05-27 RX ORDER — BACLOFEN 10 MG/1
20 TABLET ORAL 3 TIMES DAILY
Status: DISCONTINUED | OUTPATIENT
Start: 2024-05-27 | End: 2024-05-27

## 2024-05-27 RX ADMIN — SACUBITRIL AND VALSARTAN 1 TABLET: 24; 26 TABLET, FILM COATED ORAL at 21:15

## 2024-05-27 RX ADMIN — HYDROCODONE BITARTRATE AND ACETAMINOPHEN 1 TABLET: 5; 325 TABLET ORAL at 18:53

## 2024-05-27 RX ADMIN — SACUBITRIL AND VALSARTAN 1 TABLET: 24; 26 TABLET, FILM COATED ORAL at 08:48

## 2024-05-27 RX ADMIN — WATER 1000 MG: 1 INJECTION INTRAMUSCULAR; INTRAVENOUS; SUBCUTANEOUS at 01:39

## 2024-05-27 RX ADMIN — MIRTAZAPINE 15 MG: 15 TABLET, FILM COATED ORAL at 21:09

## 2024-05-27 RX ADMIN — ATORVASTATIN CALCIUM 40 MG: 40 TABLET, FILM COATED ORAL at 21:09

## 2024-05-27 RX ADMIN — Medication 10 ML: at 21:11

## 2024-05-27 RX ADMIN — Medication 6 MG: at 21:09

## 2024-05-27 RX ADMIN — TAMSULOSIN HYDROCHLORIDE 0.4 MG: 0.4 CAPSULE ORAL at 04:43

## 2024-05-27 RX ADMIN — BACLOFEN 10 MG: 10 TABLET ORAL at 21:09

## 2024-05-27 RX ADMIN — POTASSIUM BICARBONATE 40 MEQ: 782 TABLET, EFFERVESCENT ORAL at 04:43

## 2024-05-27 RX ADMIN — GABAPENTIN 300 MG: 300 CAPSULE ORAL at 08:48

## 2024-05-27 RX ADMIN — Medication 10 ML: at 10:22

## 2024-05-27 RX ADMIN — AMIODARONE HYDROCHLORIDE 200 MG: 200 TABLET ORAL at 08:48

## 2024-05-27 RX ADMIN — TAMSULOSIN HYDROCHLORIDE 0.4 MG: 0.4 CAPSULE ORAL at 21:15

## 2024-05-27 RX ADMIN — APIXABAN 5 MG: 5 TABLET, FILM COATED ORAL at 21:09

## 2024-05-27 RX ADMIN — FINASTERIDE 5 MG: 5 TABLET, FILM COATED ORAL at 08:48

## 2024-05-27 RX ADMIN — ASPIRIN 81 MG CHEWABLE TABLET 81 MG: 81 TABLET CHEWABLE at 08:48

## 2024-05-27 RX ADMIN — SODIUM CHLORIDE 1000 ML: 9 INJECTION, SOLUTION INTRAVENOUS at 04:29

## 2024-05-27 RX ADMIN — BACLOFEN 10 MG: 10 TABLET ORAL at 10:22

## 2024-05-27 RX ADMIN — GABAPENTIN 300 MG: 300 CAPSULE ORAL at 21:09

## 2024-05-27 RX ADMIN — APIXABAN 5 MG: 5 TABLET, FILM COATED ORAL at 08:48

## 2024-05-27 RX ADMIN — SENNOSIDES AND DOCUSATE SODIUM 1 TABLET: 8.6; 5 TABLET ORAL at 21:09

## 2024-05-27 RX ADMIN — GABAPENTIN 300 MG: 300 CAPSULE ORAL at 14:13

## 2024-05-27 RX ADMIN — BACLOFEN 10 MG: 10 TABLET ORAL at 14:13

## 2024-05-27 RX ADMIN — ACETAMINOPHEN 650 MG: 325 TABLET ORAL at 05:44

## 2024-05-27 ASSESSMENT — PAIN DESCRIPTION - LOCATION
LOCATION: HIP
LOCATION: HIP

## 2024-05-27 ASSESSMENT — PAIN SCALES - GENERAL
PAINLEVEL_OUTOF10: 3
PAINLEVEL_OUTOF10: 0
PAINLEVEL_OUTOF10: 0
PAINLEVEL_OUTOF10: 3
PAINLEVEL_OUTOF10: 6
PAINLEVEL_OUTOF10: 0

## 2024-05-27 ASSESSMENT — PAIN DESCRIPTION - ORIENTATION
ORIENTATION: LEFT
ORIENTATION: LEFT

## 2024-05-27 ASSESSMENT — PAIN DESCRIPTION - DESCRIPTORS
DESCRIPTORS: ACHING
DESCRIPTORS: ACHING

## 2024-05-27 ASSESSMENT — LIFESTYLE VARIABLES
HOW OFTEN DO YOU HAVE A DRINK CONTAINING ALCOHOL: NEVER
HOW MANY STANDARD DRINKS CONTAINING ALCOHOL DO YOU HAVE ON A TYPICAL DAY: PATIENT DOES NOT DRINK

## 2024-05-27 NOTE — CARE COORDINATION
Brief Note:    Patient admitted here 5/26/24 from [facility] ([facility] with AMS and Near Syncope).  The patient was initially adm to [facility] 4/24/24.  Per extended chart review, the patient had a prior hospitalization here March 2024 and was discharged to [facility] ([facility]).   Unit-based CM will continue to follow.    LEE Daniels, CRM  [phone]

## 2024-05-27 NOTE — PROGRESS NOTES
Occupational Therapy  05/27/24    Chart reviewed and spoke with primary RN, Pt awaiting pain patch but approved for therapy. Pt presenting with severe L hip pain and unable to participate in evaluation. Wife at bedside reporting plan for L hip x-ray. Evaluation aborted. Will defer and follow up as appropriate and able.    Thank you!  Whitney Daniel, OT

## 2024-05-27 NOTE — ED NOTES
Assumed care of pt @ this time. Introduced self to pt & fiance @ bedside. Pt in NAD with even & unlabored respirations. Connected pt to monitoring equipment & VS set to cycle q 1 hr. VSS. Stretcher in lowest position, brakes secured, & B/L rails raised. Call bell within pt's reach. No requests @ this time

## 2024-05-27 NOTE — CONSULTS
CARDIOLOGY CONSULT                 Assessment:     Assessment:       Principal Problem:    Syncope and collapse  Resolved Problems:    * No resolved hospital problems. *       Plan:     Syncope:     associated with hypotension     Echo 5/9/2024   Left Ventricle: Normal left ventricular systolic function with a visually estimated EF of 60 - 65%. Left ventricle size is normal. Findings consistent with moderate concentric hypertrophy. Normal wall motion in limited views obtained   Resume outpt meds and observe   . If bp stable dc with 14 day holter  we can arrange through office   Prob med related        CVA:  large vessel LMCA 3/2024  unable to recannulized occluded vessel   PAF:  hx dccv in  ed  3/2024    rx amiodarone and apixiban   CM:  presenting ef 3/2024  25%   improved to   40%  on DC 3/28/2024      GDMT    entresto and aldactone   now EF normalized         Subjective:    Date of  Admission: 5/26/2024 10:07 PM     Admission type:Emergency    Taj Clemens is a 64 y.o. male admitted for Syncope and collapse [R55]  Urinary tract infection associated with indwelling urethral catheter, initial encounter ([facility]) [T83.511A, N39.0].had a syncopal episode for 10 minutes, woke up lethargic. Nurse gave patient 100 mL of water PO while lethargic. Patient currently at [facility] or after having stroke approximate 4 weeks ago patient had received tramadol 1 hour prior to symptoms when wife was in the room noticed that it appeared that he was going unresponsive. She notified nursing and when they checked his blood pressure he was extremely hypotensive. Patient has residual deficits from his recent stroke with stress of aphasia and hemiparesis. Wife states that currently he is at baseline he also has an indwelling Sun catheter with extremely dark-colored urine. Wife states that this was placed approximately 2 weeks ago by [facility] she denies him having any fevers, chills.   No nv or diaphoreis     Patient Active Problem  MG/DL    Total Bilirubin 0.9 0.2 - 1.0 MG/DL    ALT 50 12 - 78 U/L    AST 27 15 - 37 U/L    Alk Phosphatase 77 45 - 117 U/L    Total Protein 7.9 6.4 - 8.2 g/dL    Albumin 3.6 3.5 - 5.0 g/dL    Globulin 4.3 (H) 2.0 - 4.0 g/dL    Albumin/Globulin Ratio 0.8 (L) 1.1 - 2.2     Extra Tubes Hold    Collection Time: 05/26/24 10:20 PM   Result Value Ref Range    Specimen HOld 1 RED, 1 BRAVO     Comment:        Add-on orders for these samples will be processed based on acceptable specimen integrity and analyte stability, which may vary by analyte.   EKG 12 Lead    Collection Time: 05/26/24 10:23 PM   Result Value Ref Range    Ventricular Rate 75 BPM    Atrial Rate 75 BPM    P-R Interval 144 ms    QRS Duration 86 ms    Q-T Interval 382 ms    QTc Calculation (Bazett) 426 ms    P Axis 34 degrees    R Axis 12 degrees    T Axis 63 degrees    Diagnosis       Normal sinus rhythm  Low voltage QRS  Cannot rule out Anterior infarct (cited on or before 24-MAR-2024)  Abnormal ECG  When compared with ECG of 24-MAR-2024 00:54,  Significant changes have occurred     Urinalysis with Microscopic    Collection Time: 05/26/24 11:11 PM   Result Value Ref Range    Color, UA BROWN      Appearance TURBID (A) CLEAR      Specific Gravity, UA >1.030 (H) 1.003 - 1.030    pH, Urine 6.0 5.0 - 8.0      Protein, UA >300 (A) NEG mg/dL    Glucose, Ur Negative NEG mg/dL    Ketones, Urine Negative NEG mg/dL    Blood, Urine LARGE (A) NEG      Urobilinogen, Urine 1.0 0.2 - 1.0 EU/dL    Nitrite, Urine Positive (A) NEG      Leukocyte Esterase, Urine MODERATE (A) NEG      WBC, UA 5-10 0 - 4 /hpf    RBC, UA  0 - 5 /hpf    Epithelial Cells, UA FEW FEW /lpf    BACTERIA, URINE 2+ (A) NEG /hpf    Amorphous Crystal FEW (A) NEG      Calcium Oxalate FEW (A) NEG     Bilirubin, Confirmatory    Collection Time: 05/26/24 11:11 PM   Result Value Ref Range    Bilirubin Confirmation, UA Negative NEG

## 2024-05-27 NOTE — PROGRESS NOTES
Physical Therapy 5/27/2024    Chart reviewed and spoke with primary RN, Pt awaiting pain patch but approved for therapy. Pt presenting with severe L hip pain. Wife at bedside reporting plan for L hip x-ray as MD just met with pt & wife. Evaluation aborted. Will defer and follow up as appropriate and able.     Thank you,  Anusha Berman, PT, DPT, NCS

## 2024-05-27 NOTE — ED PROVIDER NOTES
[facility] EMERGENCY DEP  EMERGENCY DEPARTMENT ENCOUNTER      Pt Name: Taj Clemens  MRN: [id number]  Birthdate 1960  Date of evaluation: 5/26/2024  Provider: Karri William MD    CHIEF COMPLAINT       Chief Complaint   Patient presents with    Loss of Consciousness         HISTORY OF PRESENT ILLNESS   (Location/Symptom, Timing/Onset, Context/Setting, Quality, Duration, Modifying Factors, Severity)  Note limiting factors.   Patient is a 64-year-old male present emergency department for episode of altered mental status/confusion.  Patient currently at [facility] or after having stroke approximate 4 weeks ago patient had received tramadol 1 hour prior to symptoms when wife was in the room noticed that it appeared that he was going unresponsive.  She notified nursing and when they checked his blood pressure he was extremely hypotensive.  Patient has residual deficits from his recent stroke with stress of aphasia and hemiparesis.  Wife states that currently he is at baseline he also has an indwelling Sun catheter with extremely dark-colored urine.  Wife states that this was placed approximately 2 weeks ago by [facility] she denies him having any fevers, chills.            Review of External Medical Records:     Nursing Notes were reviewed.    REVIEW OF SYSTEMS    (2-9 systems for level 4, 10 or more for level 5)     Review of Systems    Except as noted above the remainder of the review of systems was reviewed and negative.       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

## 2024-05-27 NOTE — ED NOTES
Pt notified that his bed is ready & this RN has arrived to take him upstairs. Pt in NAD with even & unlabored respirations. VSS. No requests @ this time. Pt transferred out of ED by this RN while on monitor & accompanied by his fiance.

## 2024-05-27 NOTE — PLAN OF CARE
Problem: Discharge Planning  Goal: Discharge to home or other facility with appropriate resources  Outcome: Progressing  Flowsheets  Taken 5/27/2024 0800 by Siomara Montes RN  Discharge to home or other facility with appropriate resources: Identify barriers to discharge with patient and caregiver  Taken 5/27/2024 0600 by Naya Carmona RN  Discharge to home or other facility with appropriate resources: Identify barriers to discharge with patient and caregiver     Problem: Safety - Adult  Goal: Free from fall injury  Outcome: Progressing  Flowsheets (Taken 5/27/2024 0800)  Free From Fall Injury: Instruct family/caregiver on patient safety     Problem: Pain  Goal: Verbalizes/displays adequate comfort level or baseline comfort level  Outcome: Progressing     Problem: Chronic Conditions and Co-morbidities  Goal: Patient's chronic conditions and co-morbidity symptoms are monitored and maintained or improved  Outcome: Progressing     Problem: Skin/Tissue Integrity  Goal: Absence of new skin breakdown  Description: 1.  Monitor for areas of redness and/or skin breakdown  2.  Assess vascular access sites hourly  3.  Every 4-6 hours minimum:  Change oxygen saturation probe site  4.  Every 4-6 hours:  If on nasal continuous positive airway pressure, respiratory therapy assess nares and determine need for appliance change or resting period.  Outcome: Progressing

## 2024-05-27 NOTE — ED NOTES
Pt undressed & gown placed on pt. Pt's belongings gathered & placed into a pt belongings bag which was left @ bedside. Excess linen removed from under pt. Nex chux placed. Pt repositioned & provided with warm blankets.

## 2024-05-27 NOTE — ED TRIAGE NOTES
Patient comes from facility via EMS. Patient was accompanied by family when he had a syncopal episode for 10 minutes, woke up lethargic. Nurse gave patient 100 mL of water PO while lethargic. Patient has urinary catheter, family reports dark urine in bag. Patient has some aphasia and hemiparesis, but this is baseline from previous CVA.     Patient is alert but confused, this is baseline as well. Family in waiting room.

## 2024-05-27 NOTE — PROGRESS NOTES
[facility] Adult  Hospitalist Group                                                                                          Hospitalist Progress Note  Juan Antonio Pompa MD  Office Phone: [phone]        Date of Service:  2024  NAME:  Taj Clemens  :  1960  MRN:  [id number]       Admission Summary:   Taj Clemens is a 64 y.o. 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  History is provided by patient's significant other at the bedside.  He had been in his usual state of health until earlier this evening while seated in his wheelchair he was noted to slump over in his chair and was difficult to arouse.  Spouse states his vitals were notable for BP of 90s over 50s.  He was subsequently arousable and transferred to the hospital.  Patient is seen and examined at bedside.  Complains of some intermittent right middle quadrant abdominal pain but none currently.     The patient denies any fever, chills, chest or abdominal pain, nausea, vomiting, cough, congestion, recent illness, palpitations, or dysuria.     Remarkable vitals on ER Presentation: Vital signs stable  Labs Remarkable for: Potassium 3.4, urinalysis showed turbid urine with large blood, nitrites, leukocyte esterase and 2+ bacteria  ER Images: Not indicated  ER Rx: Rocephin 1 g       Interval history / Subjective:   Follow up near syncope  Wife in the room \"it happened because of this other medication (tramadol)\"  The patient is just concerned about pain left hip     Assessment & Plan:     Near syncope  -Suspect vasovagal episode  -Patient with signs of volume depletion and acute cystitis  -Orthostats limited by his right hemiparesis  -Hydrate overnight  -Check orthostatics with PT OT in a.m.  -treat UTI as outlined below  -Check CT

## 2024-05-27 NOTE — ED TRIAGE NOTES
Pt to ED via EMS from nursing home after syncopal episode. Pt lethargic after episode per facility, pt BP 90/60s per staff. Bed bound at baseline. Pt at baseline upon arrival to EMS.

## 2024-05-28 PROCEDURE — G0378 HOSPITAL OBSERVATION PER HR: HCPCS

## 2024-05-28 PROCEDURE — 6370000000 HC RX 637 (ALT 250 FOR IP): Performed by: HOSPITALIST

## 2024-05-28 PROCEDURE — 6360000002 HC RX W HCPCS: Performed by: STUDENT IN AN ORGANIZED HEALTH CARE EDUCATION/TRAINING PROGRAM

## 2024-05-28 PROCEDURE — 6370000000 HC RX 637 (ALT 250 FOR IP): Performed by: STUDENT IN AN ORGANIZED HEALTH CARE EDUCATION/TRAINING PROGRAM

## 2024-05-28 PROCEDURE — 97165 OT EVAL LOW COMPLEX 30 MIN: CPT

## 2024-05-28 PROCEDURE — 97161 PT EVAL LOW COMPLEX 20 MIN: CPT

## 2024-05-28 PROCEDURE — 97530 THERAPEUTIC ACTIVITIES: CPT

## 2024-05-28 PROCEDURE — 99233 SBSQ HOSP IP/OBS HIGH 50: CPT | Performed by: SPECIALIST

## 2024-05-28 PROCEDURE — 2580000003 HC RX 258: Performed by: STUDENT IN AN ORGANIZED HEALTH CARE EDUCATION/TRAINING PROGRAM

## 2024-05-28 PROCEDURE — 1100000000 HC RM PRIVATE

## 2024-05-28 RX ORDER — CEFUROXIME AXETIL 250 MG/1
500 TABLET ORAL EVERY 12 HOURS SCHEDULED
Status: DISCONTINUED | OUTPATIENT
Start: 2024-05-28 | End: 2024-05-29 | Stop reason: HOSPADM

## 2024-05-28 RX ADMIN — AMIODARONE HYDROCHLORIDE 200 MG: 200 TABLET ORAL at 08:48

## 2024-05-28 RX ADMIN — ASPIRIN 81 MG CHEWABLE TABLET 81 MG: 81 TABLET CHEWABLE at 08:47

## 2024-05-28 RX ADMIN — APIXABAN 5 MG: 5 TABLET, FILM COATED ORAL at 21:50

## 2024-05-28 RX ADMIN — CEFUROXIME AXETIL 500 MG: 250 TABLET, FILM COATED ORAL at 10:16

## 2024-05-28 RX ADMIN — Medication 6 MG: at 21:50

## 2024-05-28 RX ADMIN — SACUBITRIL AND VALSARTAN 1 TABLET: 24; 26 TABLET, FILM COATED ORAL at 21:46

## 2024-05-28 RX ADMIN — ATORVASTATIN CALCIUM 40 MG: 40 TABLET, FILM COATED ORAL at 21:49

## 2024-05-28 RX ADMIN — TAMSULOSIN HYDROCHLORIDE 0.4 MG: 0.4 CAPSULE ORAL at 21:46

## 2024-05-28 RX ADMIN — Medication 10 ML: at 09:10

## 2024-05-28 RX ADMIN — BACLOFEN 10 MG: 10 TABLET ORAL at 21:46

## 2024-05-28 RX ADMIN — APIXABAN 5 MG: 5 TABLET, FILM COATED ORAL at 08:48

## 2024-05-28 RX ADMIN — GABAPENTIN 300 MG: 300 CAPSULE ORAL at 21:49

## 2024-05-28 RX ADMIN — GABAPENTIN 300 MG: 300 CAPSULE ORAL at 13:52

## 2024-05-28 RX ADMIN — MIRTAZAPINE 15 MG: 15 TABLET, FILM COATED ORAL at 21:50

## 2024-05-28 RX ADMIN — BACLOFEN 10 MG: 10 TABLET ORAL at 13:52

## 2024-05-28 RX ADMIN — Medication 10 ML: at 21:51

## 2024-05-28 RX ADMIN — CEFUROXIME AXETIL 500 MG: 250 TABLET, FILM COATED ORAL at 21:50

## 2024-05-28 RX ADMIN — GABAPENTIN 300 MG: 300 CAPSULE ORAL at 08:48

## 2024-05-28 RX ADMIN — SACUBITRIL AND VALSARTAN 1 TABLET: 24; 26 TABLET, FILM COATED ORAL at 08:48

## 2024-05-28 RX ADMIN — WATER 1000 MG: 1 INJECTION INTRAMUSCULAR; INTRAVENOUS; SUBCUTANEOUS at 06:15

## 2024-05-28 RX ADMIN — SENNOSIDES AND DOCUSATE SODIUM 1 TABLET: 8.6; 5 TABLET ORAL at 21:50

## 2024-05-28 RX ADMIN — FINASTERIDE 5 MG: 5 TABLET, FILM COATED ORAL at 08:48

## 2024-05-28 RX ADMIN — BACLOFEN 10 MG: 10 TABLET ORAL at 08:48

## 2024-05-28 ASSESSMENT — PAIN DESCRIPTION - ORIENTATION
ORIENTATION: RIGHT
ORIENTATION: RIGHT

## 2024-05-28 ASSESSMENT — PAIN DESCRIPTION - LOCATION
LOCATION: LEG
LOCATION: HIP

## 2024-05-28 ASSESSMENT — PAIN DESCRIPTION - DESCRIPTORS: DESCRIPTORS: CRAMPING

## 2024-05-28 ASSESSMENT — PAIN SCALES - GENERAL
PAINLEVEL_OUTOF10: 4
PAINLEVEL_OUTOF10: 4
PAINLEVEL_OUTOF10: 0

## 2024-05-28 ASSESSMENT — PAIN DESCRIPTION - PAIN TYPE: TYPE: ACUTE PAIN

## 2024-05-28 NOTE — PLAN OF CARE
Problem: Physical Therapy - Adult  Goal: By Discharge: Performs mobility at highest level of function for planned discharge setting.  See evaluation for individualized goals.  Description: FUNCTIONAL STATUS PRIOR TO ADMISSION: Admitted from SNF rehab following recent prolonged hospitalization and IPR stay following L MCA CVA. Per significant other, at [facility] pt has been working with therapy and requiring assist for sliding board transfers to/from w/c.     HOME SUPPORT PRIOR TO ADMISSION: [facility]    Physical Therapy Goals  Initiated 5/28/2024  1.  Patient will move from supine to sit and sit to supine, scoot up and down, and roll side to side in bed with maximal assistance within 7 day(s).    2.  Patient will perform sit to stand with maximal assistance within 7 day(s).  3.  Patient will transfer from bed to chair and chair to bed with maximal assistance via sliding board within 7 day(s).  4.  Patient will demo fair sitting balance at EOB x2min in prep for mobility progression within 7 days.   Outcome: Progressing   PHYSICAL THERAPY EVALUATION    Patient: Taj Clemens (64 y.o. male)  Date: 5/28/2024  Primary Diagnosis: Syncope and collapse [R55]  Urinary tract infection associated with indwelling urethral catheter, initial encounter ([facility]) [T83.511A, N39.0]       Precautions: Restrictions/Precautions: Fall Risk                      ASSESSMENT :   DEFICITS/IMPAIRMENTS:   The patient is limited by decreased functional mobility, ROM, strength, activity tolerance, safety awareness, cognition, command following, attention/concentration, coordination, balance, and R LE pain following admission from SNF rehab for c/o AMS and near syncopal episode. Per signifant other, pt has been working with therapy on performing slide board transfers. Hx significant for recent large L MCA CVA.    Received pt supine in bed, cleared for mobility by RN. Limited spontaneous verbalizations throughout session; able to follow some simple one step  Mobility Short Forms. Physical Therapy Mar 2014, [phone]) [phone]; DOI: 10.2522/ptj.57767815  2. Richard BACK, Kristi STORM, Estela STORM, Maeve STORM. Association of AM-PAC \"6-Clicks\" Basic Mobility and Daily Activity Scores With Discharge Destination. Phys Ther. 2021 Apr 4;101(4):ursi582. doi: 10.1093/ptj/vfpt055. PMID: 27899841.  3. Whitney STORM, Temo CARLSON, Alesia S, Juana K, Peter ESTRELLA. Activity Measure for Post-Acute Care \"6-Clicks\" Basic Mobility Scores Predict Discharge Destination After Acute Care Hospitalization in Select Patient Groups: A Retrospective, Observational Study. Arch Rehabil Res Clin Transl. 2022 Jul 16;4(3):309003. doi: 10.1016/j.arrct.2022.692647. PMID: 00049285; PMCID: RDV4994547.  4. Antoni PURI, Neema S, Karen W, Rosita P. AM-PAC Short Forms Manual 4.0. Revised 2/2020.                                                                                                                                                                                                                               Pain Rating:  Unable to rate/10 - localized to R hip and knee  Pain Intervention(s):   nursing notified    Activity Tolerance:   Fair  and requires rest breaks    After treatment:   Patient left in no apparent distress in bed and placed in chair position., Call bell within reach, Bed/ chair alarm activated, Caregiver / family present, Side rails x3, and Heels elevated for pressure relief    COMMUNICATION/EDUCATION:   The patient's plan of care was discussed with: occupational therapist and registered nurse    Patient Education  Education Given To: Patient;Family  Education Provided: Role of Therapy;Plan of Care;Home Exercise Program  Education Method: Verbal  Education Outcome: Continued education needed    Thank you for this referral.  Nayana Vallecillo, PT  Minutes: 24      Physical Therapy Evaluation Charge Determination   History Examination Presentation Decision-Making   HIGH Complexity :3+ comorbidities / personal factors

## 2024-05-28 NOTE — PLAN OF CARE
Problem: Discharge Planning  Goal: Discharge to home or other facility with appropriate resources  5/28/2024 0935 by Shilpi Osborn RN  Outcome: Progressing  5/28/2024 0350 by Frances Mcclellan RN  Outcome: Progressing  Flowsheets (Taken 5/27/2024 2000)  Discharge to home or other facility with appropriate resources: Identify barriers to discharge with patient and caregiver     Problem: Safety - Adult  Goal: Free from fall injury  5/28/2024 0935 by Shilpi Osborn RN  Outcome: Progressing  5/28/2024 0350 by Frances Mcclellan RN  Outcome: Progressing     Problem: Pain  Goal: Verbalizes/displays adequate comfort level or baseline comfort level  5/28/2024 0935 by Shilpi Osborn RN  Outcome: Progressing  5/28/2024 0350 by Frances Mcclellan RN  Outcome: Progressing     Problem: Chronic Conditions and Co-morbidities  Goal: Patient's chronic conditions and co-morbidity symptoms are monitored and maintained or improved  5/28/2024 0935 by Shilpi Osborn RN  Outcome: Progressing  5/28/2024 0350 by Frances Mcclellan RN  Outcome: Progressing  Flowsheets (Taken 5/27/2024 2000)  Care Plan - Patient's Chronic Conditions and Co-Morbidity Symptoms are Monitored and Maintained or Improved: Monitor and assess patient's chronic conditions and comorbid symptoms for stability, deterioration, or improvement     Problem: Skin/Tissue Integrity  Goal: Absence of new skin breakdown  Description: 1.  Monitor for areas of redness and/or skin breakdown  2.  Assess vascular access sites hourly  3.  Every 4-6 hours minimum:  Change oxygen saturation probe site  4.  Every 4-6 hours:  If on nasal continuous positive airway pressure, respiratory therapy assess nares and determine need for appliance change or resting period.  5/28/2024 0935 by Shilpi Osborn RN  Outcome: Progressing  5/28/2024 0350 by Frances Mcclellan RN  Outcome: Progressing

## 2024-05-28 NOTE — PROGRESS NOTES
24 hours ending 05/28/24 0932     Physical Examination:     I had a face to face encounter with this patient and independently examined them on 5/28/2024 as outlined below:          General : alert x 3, awake, no acute distress,   HEENT: PEERL, EOMI, moist mucus membrane, TM clear  Neck: supple, no JVD, no meningeal signs  Chest: Clear to auscultation bilaterally   CVS: S1 S2 heard, Capillary refill less than 2 seconds  Abd: soft/ non tender, non distended, BS physiological,   Ext: no clubbing, no cyanosis, no edema, brisk 2+ DP pulses  Neuro/Psych: pleasant mood and affect, CN 2-12 grossly intact, sensory grossly within normal limit, Strength 5/5 in all extremities, DTR 1+ x 4  Skin: warm     Data Review:    Review and/or order of clinical lab test      I have personally and independently reviewed all pertinent labs, diagnostic studies, imaging, and have provided independent interpretation of the same.     Labs:     Recent Labs     05/26/24  2220   WBC 6.1   HGB 13.2   HCT 39.7          Recent Labs     05/26/24  2220      K 3.4*      CO2 30   BUN 12       Recent Labs     05/26/24  2220   ALT 50   GLOB 4.3*       No results for input(s): \"INR\", \"APTT\" in the last 72 hours.    Invalid input(s): \"PTP\"   No results for input(s): \"TIBC\" in the last 72 hours.    Invalid input(s): \"FE\", \"PSAT\", \"FERR\"   No results found for: \"RBCF\"   No results for input(s): \"PH\", \"PCO2\", \"PO2\" in the last 72 hours.  No results for input(s): \"CPK\" in the last 72 hours.    Invalid input(s): \"CPKMB\", \"CKNDX\", \"TROIQ\"  Lab Results   Component Value Date/Time    CHOL 109 03/24/2024 05:19 AM    HDL 31 03/24/2024 05:19 AM    LDL 59.6 03/24/2024 05:19 AM     No results found for: \"GLUCPOC\"  [unfilled]    Notes reviewed from all clinical/nonclinical/nursing services involved in patient's clinical care. Care coordination discussions were held with appropriate clinical/nonclinical/ nursing providers based on care coordination needs.          Patients current active Medications were reviewed, considered, added and adjusted based on the clinical condition today.      Home Medications were reconciled to the best of my ability given all available resources at the time of admission. Route is PO if not otherwise noted.      Admission Status:[id number]:::1}      Medications Reviewed:     Current Facility-Administered Medications   Medication Dose Route Frequency    amiodarone (CORDARONE) tablet 200 mg  200 mg Per NG tube Daily    apixaban (ELIQUIS) tablet 5 mg  5 mg Oral BID    aspirin chewable tablet 81 mg  81 mg Oral Daily    atorvastatin (LIPITOR) tablet 40 mg  40 mg Oral Nightly    baclofen (LIORESAL) tablet 10 mg  10 mg Oral TID    finasteride (PROSCAR) tablet 5 mg  5 mg Oral Daily    gabapentin (NEURONTIN) capsule 300 mg  300 mg Oral TID    melatonin tablet 6 mg  6 mg Oral Nightly    mirtazapine (REMERON) tablet 15 mg  15 mg Oral Nightly    sacubitril-valsartan (ENTRESTO) 24-26 MG per tablet 1 tablet  1 tablet Oral BID    sennosides-docusate sodium (SENOKOT-S) 8.6-50 MG tablet 1 tablet  1 tablet Oral Nightly    tamsulosin (FLOMAX) capsule 0.4 mg  0.4 mg Oral QHS    cefTRIAXone (ROCEPHIN) 1,000 mg in sterile water 10 mL IV syringe  1,000 mg IntraVENous Q24H    sodium chloride flush 0.9 % injection 5-40 mL  5-40 mL IntraVENous 2 times per day    sodium chloride flush 0.9 % injection 5-40 mL  5-40 mL IntraVENous PRN    0.9 % sodium chloride infusion   IntraVENous PRN    potassium chloride (KLOR-CON) extended release tablet 40 mEq  40 mEq Oral PRN    Or    potassium bicarb-citric acid (EFFER-K) effervescent tablet 40 mEq  40 mEq Oral PRN    Or    potassium chloride 10 mEq/100 mL IVPB (Peripheral Line)  10 mEq IntraVENous PRN    magnesium sulfate 2000 mg in 50 mL IVPB premix  2,000 mg IntraVENous PRN    ondansetron (ZOFRAN-ODT) disintegrating tablet 4 mg  4 mg Oral Q8H PRN    Or    ondansetron (ZOFRAN) injection 4 mg  4 mg IntraVENous Q6H PRN

## 2024-05-28 NOTE — CONSULTS
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 Authorizing Provider   ascorbic acid (VITAMIN C) 500 MG tablet Take 1 tablet by mouth 2 times daily   Yes Sherif Dao MD   baclofen (LIORESAL) 20 MG tablet Take 1 tablet by mouth 3 times daily   Yes Sherif Dao MD   baclofen (LIORESAL) 10 MG tablet Take 1 tablet by mouth 3 times daily   Yes Sherif Dao MD   Baclofen (LIORESAL) 5 MG tablet Take 1 tablet by mouth 3 times daily   Yes Sherif Dao MD   lidocaine 4 % external patch Place 1 patch onto the skin daily   Yes Sherif Dao MD   polyethylene glycol (GLYCOLAX) 17 GM/SCOOP powder Take 17 g by mouth 2 times daily as needed (constipation)   Yes Sherif Dao MD   mirtazapine (REMERON) 15 MG tablet Take 1 tablet by mouth nightly   Yes Sherif Dao MD   senna-docusate (PERICOLACE) 8.6-50 MG per tablet Take 1 tablet by mouth at bedtime   Yes Sherif Dao MD   traMADol (ULTRAM) 50 MG tablet Take 1 tablet by mouth daily as needed for Pain. Max Daily Amount: 50 mg   Yes Sherif Dao MD   acetaminophen (TYLENOL) 500 MG tablet Take 2 tablets by mouth 2 times daily   Yes Sherif Dao MD   acetaminophen (TYLENOL) 325 MG tablet Take 2 tablets by mouth every 6 hours as needed for Pain   Yes Sherif Dao MD   apixaban (ELIQUIS) 5 MG TABS tablet Take by mouth 2 times daily    Sherif Dao MD   docusate (COLACE) 50 MG/5ML liquid Take 5 mLs by mouth  discussed this with patient and his girlfriend at length. His symptoms are somewhat vague and are minimal at the time of my evaluation which makes diagnosis challenging. Don't think further imaging is necessary at this point. Certainly nothing surgical is indicated. It's possible that he is experiencing some lumbar radiculopathy but does not have any clear motor/sensory deficits that would not be attributed to his recent stroke. We discussed starting him on a medrol dose pack. Would not start tonight but could be considered if his symptoms warrant this tomorrow. Will defer to medicine on whether or not to start this tomorrow. Otherwise, he may follow-up outpatient for further evaluation.     MARITA Guerrero  Orthopedic Surgery PA  Orthopedic Trauma Service  [facility]  Today's Date: May 28, 2024

## 2024-05-28 NOTE — PROGRESS NOTES
[facility] Adult  Hospitalist Group                                                                                          Hospitalist Progress Note  Juan Antonio Pompa MD  Office Phone: [phone]        Date of Service:  2024  NAME:  Taj Clemens  :  1960  MRN:  [id number]       Admission Summary:   Taj Clemens is a 64 y.o. 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  History is provided by patient's significant other at the bedside.  He had been in his usual state of health until earlier this evening while seated in his wheelchair he was noted to slump over in his chair and was difficult to arouse.  Spouse states his vitals were notable for BP of 90s over 50s.  He was subsequently arousable and transferred to the hospital.  Patient is seen and examined at bedside.  Complains of some intermittent right middle quadrant abdominal pain but none currently.     The patient denies any fever, chills, chest or abdominal pain, nausea, vomiting, cough, congestion, recent illness, palpitations, or dysuria.     Remarkable vitals on ER Presentation: Vital signs stable  Labs Remarkable for: Potassium 3.4, urinalysis showed turbid urine with large blood, nitrites, leukocyte esterase and 2+ bacteria  ER Images: Not indicated  ER Rx: Rocephin 1 g       Interval history / Subjective:   Follow up near syncope  Patient agitated overnight  Much calmer this am  Refusing IV antibiotics  Family in the room  Per family patient not eating/drinking that much    Later :  Girlfriend says that the patient is now drinking more fluids  Wants to know how often the urinary bag has been emptied       Assessment & Plan:     Near syncope  -Suspect vasovagal episode from hypotension/?tramadol/cystitis  -looks much better

## 2024-05-28 NOTE — PLAN OF CARE
Problem: Discharge Planning  Goal: Discharge to home or other facility with appropriate resources  Outcome: Progressing  Flowsheets (Taken 5/27/2024 2000)  Discharge to home or other facility with appropriate resources: Identify barriers to discharge with patient and caregiver     Problem: Safety - Adult  Goal: Free from fall injury  Outcome: Progressing     Problem: Pain  Goal: Verbalizes/displays adequate comfort level or baseline comfort level  Outcome: Progressing

## 2024-05-28 NOTE — CARE COORDINATION
Transition of Care Plan:    RUR: 19%  Prior Level of Functioning: Patient was admitted from [facility].  He had been to [facility] prior to that.  Disposition: SNF: Referrals sent to [facility] via [employer] and [facility] via [employer].  If SNF or IPR: Date FOC offered: 5/28/24  Date FOC received: 5/28/24  Accepting facility:   Date authorization started with reference number:   Date authorization received and expires:   Follow up appointments: PCP/Specialist  DME needed: No  Transportation at discharge: BLS vs. WC van  IM/IMM Medicare/ letter given:   Is patient a [place] and connected with [facility]?    If yes, was [place] transfer form completed and [facility] notified?   Caregiver Contact: Romi Ovalles [phone]  Discharge Caregiver contacted prior to discharge? Yes  Care Conference needed? No  Barriers to discharge:  Medical stability       05/28/24 1527   Service Assessment   Patient Orientation Alert and Oriented   Cognition Alert   History Provided By Significant Other   Primary Caregiver Self   Support Systems Spouse/Significant Other   PCP Verified by CM No  (Patient's spouse is making appointment with a new PCP)   Prior Functional Level Independent in ADLs/IADLs   Current Functional Level Independent in ADLs/IADLs   Can patient return to prior living arrangement Yes   Ability to make needs known: Fair   Family able to assist with home care needs: Yes   Would you like for me to discuss the discharge plan with any other family members/significant others, and if so, who? Yes  (Romi Ovalles [phone])   Financial Resources Medicare   Social/Functional History   Lives With Spouse   Type of Home House   Home Layout One level   Bathroom Shower/Tub Tub/Shower unit;Walk-in shower   Bathroom Toilet Standard   Bathroom Equipment Grab bars in shower   Home Equipment None   Receives Help From Family   ADL Assistance Independent   Homemaking Assistance Independent   Homemaking Responsibilities Yes   Occupation Full

## 2024-05-28 NOTE — PROGRESS NOTES
[facility] CARDIOLOGY  Cardiology Care Note                  []Initial visit     [x]Established visit     Patient Name: Taj Clemens - :1960 - MRN:[id number]  Primary Cardiologist: Viridiana Gillis MD  Rounding Cardiologist: Viridiana Gillis MD     Assessment/Plan/Discussion:Cardiology Attending:     Patient seen on the day of progress note and examined  reviewed  with Advance Practice Provider (HUSSAIN, NP,PA)       A/P/ Medical Decision Making:  Syncope likely due to UTI perhaps new medicine.  I think it is more likely than the tramadol.  Rhythm has been stable.  Will put a 14-day Holter as an outpatient.    Tachycardia induced cardiomyopathy systolic CHF EF improved from 25 to normal with control of rhythm.  Has been on Entresto.  Will hold Coreg at discharge for now and then restarted at a later date.  Currently NYHA I    Atrial flutter previous cardioversion remains in sinus 14-day monitor on discharge    Hypertension following    Cerebrovascular disease status post large stroke    Overall at this time agree with holding Coreg getting Holter and he will follow-up as an outpatient.  We will see back as needed.  Please discuss hip pain with his significant other.  Note hip x-ray was normal.    Taj Clemens is a 64 y.o. male   S: Overnight--- patient seen Dr. Harrell for yesterday informing that he did not realize that we also see the patient known Mr. Clemens for since his last admission.  He had a stroke unfortunately which is left him with significant expressive aphasia and hemiparesis.  The stroke was in March.  At that time he was in atrial fibrillation with RVR and had a cardioversion.  He also had a known thrombus in the distal aorta.  He had a syncopal episode at rehab was getting some water became lethargic and poorly responsive for about 10 minutes.  Blood pressure was noted to be low.  He is found to have a possible UTI.   hypotensive with BP 90's/60's at facility.   In ER, he was also found to have UTI.  Pt denied any SOB, chest pain. Pt does not recall event/near syncope.  BP on arrival to ER was 146/96, HR 66.    Girlfriend reports that tramadol was a new med  for him.  States she notices he gets sleepy with pain medications as he was not taking pain medications prior to CVA.       SUBJECTIVE:  Girlfriend reports pt has been having Rt lower extremity pain and most recently complaining of left hip pain. Has been working with PT with a new walking device. Noted continued aphasia. Girlfriend reports pt has not been taking in much po fluids.   He remains in NSR.      Assessment and Plan      Syncope vs near syncope:  associated with hypotension.  Recent echo with NL LV function, NWMA, tr MR.  Suspect may be multifactorial due to new pain medication in combination with BP/CHF meds, Proscar and Flomax, poor po intake, possible mild dehydration in setting of UTI.  No evidence of significant bradycardia, no heart block on tele review.  Remains in NSR- sinus tachycardia.    BMP  ordered   Consider outpatient holter monitor.     History of cardiomyopathy/systolic CHF/HFimpEF: EF as low as 25-30% after CVA, but subsequent LV function improved to 40% on 3/28/24, then  normalized (EF 60-65% on 5/9/24).  Suspect NYHA I.  On entresto 24-26 mg BID.  ?Likely Restart coreg 3.125 mg BID- will d/w Dr. Gillis.  Does not appear volume overloaded on exam.  CM may have been tachycardia induced.       History of typical aflutter:  s/p cardioversion 3/24/24.  Now NSR, no evidence of afib on tele review.  Continue amiodarone. Consider resuming low dose coreg.  Continue eliquis.  Check TSH with a.m. labs.     History of essential HTN:  bp mildly elevated this a.m. but due for meds.  Entresto 24-26 BID. Also on proscar and flomax for urinary retention which can affect BP.     Left MCA CVA (large):  3/2024.  Continue statin, ASA, eliquis. LDL 59, HDL 3
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
## 2024-05-28 NOTE — PLAN OF CARE
received semi-reclined in bed, agreeable to therapy, cleared by RN. Pt was Max A x2 for supine<>sit, heavy reliance on LUE for sitting balance but able to hold midline with cues to bring LUE. Pt was Max x2 for getting back into bed. Pt was able to self feed and drink when sitting back in bed with LUE. RUE propped on pillows, and blanket used to prevent external rotation of RLE.  Pt was left in high fowlers, all needs met, call bell  in reach. Will continue to follow in acute setting.      Functional Outcome Measure:  The patient scored 10/24 on the [facility] outcome measure        PLAN :  Recommendations and Planned Interventions:   self care training, therapeutic activities, functional mobility training, balance training, therapeutic exercise, endurance activities, patient education, home safety training, and family training/education    Frequency/Duration: OT Plan of Care: 3 times/week    Recommendation for discharge: (in order for the patient to meet his/her long term goals): Therapy up to 5 days/week in Skilled nursing facility    Other factors to consider for discharge: patient's current support system is unable to meet their requirements for physical assistance, poor safety awareness, impaired cognition, high risk for falls, not safe to be alone, and concern for safely navigating or managing the home environment    IF patient discharges home will need the following DME: continuing to assess with progress       SUBJECTIVE:   Patient stated, “hello.”  OBJECTIVE DATA SUMMARY:     Past Medical History:   Diagnosis Date    Allergic rhinitis 11/6/2017    Atrial fibrillation (HCC)     Constipation     Hemorrhoids     HTN (hypertension) 12/13/2012    Hypertension     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     Past Surgical History:   Procedure Laterality Date    HEMORRHOID SURGERY  1993    IR MECHANICAL ART THROMBECTOMY INTRACRANIAL  3/24/2024     IR MECHANICAL ART THROMBECTOMY INTRACRANIAL 3/24/2024 [facility] RAD ANGIO IR          Expanded or extensive additional review of patient history:   Social/Functional History  Lives With: Spouse  Type of Home: House  Home Layout: One level  Bathroom Shower/Tub: Tub/Shower unit, Walk-in shower  Bathroom Toilet: Standard  Bathroom Equipment: Grab bars in shower  Home Equipment: None  Receives Help From: Family  ADL Assistance: Independent  Homemaking Assistance: Independent  Homemaking Responsibilities: Yes  Ambulation Assistance: Independent  Transfer Assistance: Independent  Active : Yes  Mode of Transportation: Car  Occupation: Full time employment          EXAMINATION OF PERFORMANCE DEFICITS:    Cognitive/Behavioral Status:  Orientation  Orientation Level: Unable to assess (2* aphasia)  Cognition  Overall Cognitive Status: Exceptions  Following Commands: Follows one step commands with increased time;Follows one step commands with repetition  Attention Span: Attends with cues to redirect;Difficulty attending to directions  Safety Judgement: Decreased awareness of need for safety;Decreased awareness of need for assistance  Problem Solving: Decreased awareness of errors  Insights: Decreased awareness of deficits  Initiation: Requires cues for all  Sequencing: Requires cues for all           Range of Motion:   AROM: Generally decreased, functional (RUE nonfunctional)         Strength:  Strength: Generally decreased, functional      Coordination:  Coordination: Generally decreased, functional            Tone & Sensation:   Tone: Normal  Sensation: Intact          Functional Mobility and Transfers for ADLs:    Bed Mobility:     Bed Mobility Training  Bed Mobility Training: Yes  Overall Level of Assistance: Assist X2;Maximum assistance;Total assistance  Interventions: Safety awareness training;Tactile cues;Verbal cues;Weight shifting training/pressure relief  Rolling: Maximum assistance;Total assistance;Assist X2  Supine

## 2024-05-29 ENCOUNTER — APPOINTMENT (OUTPATIENT)
Facility: HOSPITAL | Age: 64
DRG: 699 | End: 2024-05-29
Attending: HOSPITALIST
Payer: MEDICARE

## 2024-05-29 ENCOUNTER — APPOINTMENT (OUTPATIENT)
Facility: HOSPITAL | Age: 64
DRG: 699 | End: 2024-05-29
Payer: MEDICARE

## 2024-05-29 VITALS
OXYGEN SATURATION: 99 % | SYSTOLIC BLOOD PRESSURE: 141 MMHG | WEIGHT: 249.78 LBS | RESPIRATION RATE: 10 BRPM | DIASTOLIC BLOOD PRESSURE: 99 MMHG | TEMPERATURE: 97.7 F | HEART RATE: 75 BPM | HEIGHT: 72 IN | BODY MASS INDEX: 33.83 KG/M2

## 2024-05-29 LAB
ANION GAP SERPL CALC-SCNC: 3 MMOL/L (ref 5–15)
BACTERIA SPEC CULT: ABNORMAL
BUN SERPL-MCNC: 10 MG/DL (ref 6–20)
BUN/CREAT SERPL: 11 (ref 12–20)
CALCIUM SERPL-MCNC: 9.3 MG/DL (ref 8.5–10.1)
CC UR VC: ABNORMAL
CHLORIDE SERPL-SCNC: 107 MMOL/L (ref 97–108)
CO2 SERPL-SCNC: 28 MMOL/L (ref 21–32)
CREAT SERPL-MCNC: 0.91 MG/DL (ref 0.7–1.3)
ECHO BSA: 2.4 M2
ECHO BSA: 2.4 M2
GLUCOSE SERPL-MCNC: 98 MG/DL (ref 65–100)
POTASSIUM SERPL-SCNC: 3.6 MMOL/L (ref 3.5–5.1)
SERVICE CMNT-IMP: ABNORMAL
SODIUM SERPL-SCNC: 138 MMOL/L (ref 136–145)
TSH SERPL DL<=0.05 MIU/L-ACNC: 3.46 UIU/ML (ref 0.36–3.74)

## 2024-05-29 PROCEDURE — 84443 ASSAY THYROID STIM HORMONE: CPT

## 2024-05-29 PROCEDURE — 93971 EXTREMITY STUDY: CPT

## 2024-05-29 PROCEDURE — 6370000000 HC RX 637 (ALT 250 FOR IP): Performed by: STUDENT IN AN ORGANIZED HEALTH CARE EDUCATION/TRAINING PROGRAM

## 2024-05-29 PROCEDURE — 2580000003 HC RX 258: Performed by: STUDENT IN AN ORGANIZED HEALTH CARE EDUCATION/TRAINING PROGRAM

## 2024-05-29 PROCEDURE — 80048 BASIC METABOLIC PNL TOTAL CA: CPT

## 2024-05-29 PROCEDURE — 6370000000 HC RX 637 (ALT 250 FOR IP): Performed by: HOSPITALIST

## 2024-05-29 PROCEDURE — 36415 COLL VENOUS BLD VENIPUNCTURE: CPT

## 2024-05-29 RX ORDER — HYDROCODONE BITARTRATE AND ACETAMINOPHEN 5; 325 MG/1; MG/1
1 TABLET ORAL EVERY 6 HOURS PRN
Qty: 10 TABLET | Refills: 0 | Status: SHIPPED | OUTPATIENT
Start: 2024-05-29 | End: 2024-06-01

## 2024-05-29 RX ORDER — CEFUROXIME AXETIL 500 MG/1
500 TABLET ORAL EVERY 12 HOURS SCHEDULED
Qty: 6 TABLET | Refills: 0 | Status: SHIPPED | OUTPATIENT
Start: 2024-05-29 | End: 2024-06-01

## 2024-05-29 RX ADMIN — Medication 10 ML: at 09:27

## 2024-05-29 RX ADMIN — APIXABAN 5 MG: 5 TABLET, FILM COATED ORAL at 09:26

## 2024-05-29 RX ADMIN — FINASTERIDE 5 MG: 5 TABLET, FILM COATED ORAL at 09:26

## 2024-05-29 RX ADMIN — BACLOFEN 10 MG: 10 TABLET ORAL at 09:26

## 2024-05-29 RX ADMIN — AMIODARONE HYDROCHLORIDE 200 MG: 200 TABLET ORAL at 09:26

## 2024-05-29 RX ADMIN — BACLOFEN 10 MG: 10 TABLET ORAL at 14:05

## 2024-05-29 RX ADMIN — GABAPENTIN 300 MG: 300 CAPSULE ORAL at 14:05

## 2024-05-29 RX ADMIN — CEFUROXIME AXETIL 500 MG: 250 TABLET, FILM COATED ORAL at 09:26

## 2024-05-29 RX ADMIN — SACUBITRIL AND VALSARTAN 1 TABLET: 24; 26 TABLET, FILM COATED ORAL at 09:26

## 2024-05-29 RX ADMIN — GABAPENTIN 300 MG: 300 CAPSULE ORAL at 09:26

## 2024-05-29 RX ADMIN — ASPIRIN 81 MG CHEWABLE TABLET 81 MG: 81 TABLET CHEWABLE at 09:26

## 2024-05-29 NOTE — CARE COORDINATION
Transition of Care Plan:     RUR: 19%  Prior Level of Functioning: Independent  Disposition: SNF: [facility] will accept today.  Patient will leave after duplex is completed today. Patient also needs a holter monitor prior to discharge.  CHECO spoke to lisa Abbottison [phone]  Per Milena, patient needs to arrive prior to 8pm so CM scheduled transportation for 6pm with [facility].    RN call report to [phone]    Patient will go into room 129P  If SNF or IPR: Date FOC offered: 5/28/24  Date FOC received: 5/28/24  Accepting facility:   Date authorization started with reference number:   Date authorization received and expires:   Follow up appointments: PCP/Specialist  DME needed: No  Transportation at discharge: S -Hospital to Home scheduled for 6pm.  IM/IMM Medicare/ letter given:   Is patient a Skipwith and connected with [facility]?               If yes, was  transfer form completed and [facility] notified?   Caregiver Contact: Romi Ovalles [phone]  Discharge Caregiver contacted prior to discharge? Yes  Care Conference needed? No  Barriers to discharge:  None    Belkis Sharma, ISI/CRM  [phone]

## 2024-05-29 NOTE — PLAN OF CARE
Problem: Discharge Planning  Goal: Discharge to home or other facility with appropriate resources  5/29/2024 1503 by Shilpi Osborn RN  Outcome: Adequate for Discharge  5/29/2024 1057 by Shilpi Osborn RN  Outcome: Progressing  Flowsheets (Taken 5/29/2024 0800)  Discharge to home or other facility with appropriate resources: Identify barriers to discharge with patient and caregiver     Problem: Safety - Adult  Goal: Free from fall injury  5/29/2024 1503 by Shilpi Osborn RN  Outcome: Adequate for Discharge  5/29/2024 1057 by Shilpi Osborn RN  Outcome: Progressing  Flowsheets (Taken 5/29/2024 0800)  Free From Fall Injury: Instruct family/caregiver on patient safety     Problem: Pain  Goal: Verbalizes/displays adequate comfort level or baseline comfort level  5/29/2024 1503 by Shilpi Osborn RN  Outcome: Adequate for Discharge  5/29/2024 1057 by Shilpi Osborn RN  Outcome: Progressing  Flowsheets (Taken 5/29/2024 0800)  Verbalizes/displays adequate comfort level or baseline comfort level:   Encourage patient to monitor pain and request assistance   Assess pain using appropriate pain scale   Administer analgesics based on type and severity of pain and evaluate response     Problem: Chronic Conditions and Co-morbidities  Goal: Patient's chronic conditions and co-morbidity symptoms are monitored and maintained or improved  5/29/2024 1503 by Shilpi Osborn RN  Outcome: Adequate for Discharge  5/29/2024 1057 by Shilpi Osborn RN  Outcome: Progressing  Flowsheets (Taken 5/29/2024 0800)  Care Plan - Patient's Chronic Conditions and Co-Morbidity Symptoms are Monitored and Maintained or Improved:   Monitor and assess patient's chronic conditions and comorbid symptoms for stability, deterioration, or improvement   Collaborate with multidisciplinary team to address chronic and comorbid conditions and prevent exacerbation or deterioration   Update acute care plan with appropriate goals if chronic or comorbid symptoms are exacerbated

## 2024-05-29 NOTE — DISCHARGE INSTRUCTIONS
Discharge Instructions       PATIENT ID: Taj Clemens  MRN: [id number]   YOB: 1960    DATE OF ADMISSION: [unfilled]    DATE OF DISCHARGE: 5/29/2024    PRIMARY CARE PROVIDER: @PCP@     ATTENDING PHYSICIAN: [unfilled]  DISCHARGING PROVIDER: Juan Antonio Pompa MD    To contact this individual call [phone] and ask the  to page.   If unavailable ask to be transferred the Adult Hospitalist Department.    DISCHARGE DIAGNOSES syncope    CONSULTATIONS: cardiology, orthopedics     PROCEDURES/SURGERIES: * No surgery found *    PENDING TEST RESULTS:   At the time of discharge the following test results are still pending: none    FOLLOW UP APPOINTMENTS:   PCP  Cardiology    ADDITIONAL CARE RECOMMENDATIONS: as above    DIET: cardiac diet    ACTIVITY: activity as tolerated    DISCHARGE MEDICATIONS:   See Medication Reconciliation Form    It is important that you take the medication exactly as they are prescribed.   Keep your medication in the bottles provided by the pharmacist and keep a list of the medication names, dosages, and times to be taken in your wallet.   Do not take other medications without consulting your doctor.       NOTIFY YOUR PHYSICIAN FOR ANY OF THE FOLLOWING:   Fever over 101 degrees for 24 hours.   Chest pain, shortness of breath, fever, chills, nausea, vomiting, diarrhea, change in mentation, falling, weakness, bleeding. Severe pain or pain not relieved by medications.  Or, any other signs or symptoms that you may have questions about.      DISPOSITION:    Home With:   OT  PT  HH  RN      x SNF/Inpatient Rehab/LTAC    Independent/assisted living    Hospice    Other:     CDMP Checked:   Yes x     PROBLEM LIST Updated:  Yes x       Signed:   Juan Antonio Pompa MD  5/29/2024  1:21 PM

## 2024-05-29 NOTE — CARE COORDINATION
Shilpi called evening CM to request updated ETA for pt's dc transportation. H2H was scheduled for 18:00.     [facility] reported transport team is on premises. CM alerted [facility] that pt needs to arrive to [facility] by 20:00. Transport team was at nurses station before call ended with [facility].  ANGELIC [place]   or by [employer]

## 2024-05-29 NOTE — PLAN OF CARE
Problem: Discharge Planning  Goal: Discharge to home or other facility with appropriate resources  Outcome: Progressing     Problem: Safety - Adult  Goal: Free from fall injury  Outcome: Progressing     Problem: Pain  Goal: Verbalizes/displays adequate comfort level or baseline comfort level  Outcome: Progressing  Flowsheets (Taken 5/28/2024 1600 by Shilpi Osborn RN)  Verbalizes/displays adequate comfort level or baseline comfort level: Encourage patient to monitor pain and request assistance

## 2024-05-29 NOTE — DISCHARGE SUMMARY
Discharge Summary       PATIENT ID: Taj Clemens  MRN: [id number]   YOB: 1960    DATE OF ADMISSION: 5/26/2024 10:07 PM    DATE OF DISCHARGE: 5/29/2024   PRIMARY CARE PROVIDER: No primary care provider on file.     ATTENDING PHYSICIAN: Dr Juan Antonio Pompa  DISCHARGING PROVIDER: Juan Antonio Pompa MD    To contact this individual call [phone] and ask the  to page.  If unavailable ask to be transferred the Adult Hospitalist Department.    CONSULTATIONS: IP CONSULT TO CARDIOLOGY  IP CONSULT TO ORTHOPEDIC SURGERY    PROCEDURES/SURGERIES: * No surgery found *    ADMITTING DIAGNOSES & HOSPITAL COURSE:   Near syncope  -Suspect vasovagal episode from hypotension/?tramadol/cystitis  -looks much better today  -Appreciate discussion with cardiology, cleared for discharge  -PT/OT     Acute complicated cystitis  Chronic indwelling Pate  -reportedly pate was changed at the facility on the day of admission-  -follow urine culture  -continue ceftin     Agitation  Likely hospital delirium  -now resolved     Left hip pain  -xray unremarkable  -did not see the need for CT  -pain control, restart baclofen, if needed will add narco  -Appreciate ortho, ?steroids. Would try to avoid if possible because of side effects      Acute large Left CVA  -Continue PTA meds  -PT OT     Afib  S/p cardioversion 3/24  -Rate controlled.  Continue PTA meds     Cardiomyopathy /HFrEF  -recent echo with improvement in EF  -Outpatient follows with Dr Gillis, will consult  -PTA, continue home medications     Abdominal aortic distal thrombus   -Continue Eliquis      PENDING TEST RESULTS:   At the time of discharge the following test results are still pending: none    FOLLOW UP APPOINTMENTS:    PCP  Cardiology    ADDITIONAL CARE RECOMMENDATIONS: as above    DIET: cardiac diet    ACTIVITY: activity as tolerated      DISCHARGE MEDICATIONS:     Medication List        START taking these medications      cefUROXime 500 MG tablet  Commonly  known as: CEFTIN  Take 1 tablet by mouth every 12 hours for 3 days     HYDROcodone-acetaminophen 5-325 MG per tablet  Commonly known as: NORCO  Take 1 tablet by mouth every 6 hours as needed for Pain for up to 3 days. Max Daily Amount: 4 tablets            CHANGE how you take these medications      baclofen 10 MG tablet  Commonly known as: LIORESAL  What changed: Another medication with the same name was removed. Continue taking this medication, and follow the directions you see here.            CONTINUE taking these medications      * acetaminophen 500 MG tablet  Commonly known as: TYLENOL     * acetaminophen 325 MG tablet  Commonly known as: TYLENOL     amiodarone 200 MG tablet  Commonly known as: CORDARONE  1 tablet by Per NG tube route daily     apixaban 5 MG Tabs tablet  Commonly known as: ELIQUIS     ascorbic acid 500 MG tablet  Commonly known as: VITAMIN C     aspirin 81 MG chewable tablet     atorvastatin 40 MG tablet  Commonly known as: LIPITOR  Take 1 tablet by mouth nightly     Entresto 24-26 MG per tablet  Generic drug: sacubitril-valsartan     finasteride 5 MG tablet  Commonly known as: PROSCAR     gabapentin 100 MG capsule  Commonly known as: NEURONTIN     lidocaine 4 % external patch     melatonin 3 MG Tabs tablet     mirtazapine 15 MG tablet  Commonly known as: REMERON     polyethylene glycol 17 GM/SCOOP powder  Commonly known as: GLYCOLAX     senna-docusate 8.6-50 MG per tablet  Commonly known as: PERICOLACE     tamsulosin 0.4 MG capsule  Commonly known as: FLOMAX     vitamin D 25 MCG (1000 UT) Caps           * This list has 2 medication(s) that are the same as other medications prescribed for you. Read the directions carefully, and ask your doctor or other care provider to review them with you.                STOP taking these medications      carvedilol 6.25 MG tablet  Commonly known as: COREG     cetirizine 10 MG tablet  Commonly known as: ZYRTEC     docusate 50 MG/5ML liquid  Commonly known as:

## 2024-05-29 NOTE — PROGRESS NOTES
[facility] Adult  [facility]ist [facility]                                                                                          Hospitalist Progress Note  Juan Antonio Pompa MD  Office Phone: [phone]        Date of Service:  2024  NAME:  Taj Clemens  :  1960  MRN:  [id number]       Admission Summary:   Taj Clemens is a 64 y.o. male with history of left MCA stroke with residual right hemiparesis and aphasia, A-fib, HFrEF, abdominal aortic distal thrombus, chronic Pate who presented to the hospital from nursing facility for concerns of syncope-like episode.  History is provided by patient's significant other at the bedside.  He had been in his usual state of health until earlier this evening while seated in his wheelchair he was noted to slump over in his chair and was difficult to arouse.  Spouse states his vitals were notable for BP of 90s over 50s.  He was subsequently arousable and transferred to the hospital.  Patient is seen and examined at bedside.  Complains of some intermittent right middle quadrant abdominal pain but none currently.     The patient denies any fever, chills, chest or abdominal pain, nausea, vomiting, cough, congestion, recent illness, palpitations, or dysuria.     Remarkable vitals on ER Presentation: Vital signs stable  Labs Remarkable for: Potassium 3.4, urinalysis showed turbid urine with large blood, nitrites, leukocyte esterase and 2+ bacteria  ER Images: Not indicated  ER Rx: Rocephin 1 g       Interval history / Subjective:   Follow up near syncope  No new issues  Good urine output  Comfortably sleeping today  Girlfriend requesting doppler RUE       Assessment & Plan:     Near syncope  -Suspect vasovagal episode from hypotension/?tramadol/cystitis  -looks much better today  -Appreciate discussion with cardiology, cleared for discharge  -PT/OT     Acute complicated cystitis  Chronic indwelling Pate  -reportedly pate was changed at the facility on the  coordination discussions were held with appropriate clinical/nonclinical/ nursing providers based on care coordination needs.         Patients current active Medications were reviewed, considered, added and adjusted based on the clinical condition today.      Home Medications were reconciled to the best of my ability given all available resources at the time of admission. Route is PO if not otherwise noted.      Admission Status:[id number]:::1}      Medications Reviewed:     Current Facility-Administered Medications   Medication Dose Route Frequency    cefUROXime (CEFTIN) tablet 500 mg  500 mg Oral 2 times per day    amiodarone (CORDARONE) tablet 200 mg  200 mg Per NG tube Daily    apixaban (ELIQUIS) tablet 5 mg  5 mg Oral BID    aspirin chewable tablet 81 mg  81 mg Oral Daily    atorvastatin (LIPITOR) tablet 40 mg  40 mg Oral Nightly    baclofen (LIORESAL) tablet 10 mg  10 mg Oral TID    finasteride (PROSCAR) tablet 5 mg  5 mg Oral Daily    gabapentin (NEURONTIN) capsule 300 mg  300 mg Oral TID    melatonin tablet 6 mg  6 mg Oral Nightly    mirtazapine (REMERON) tablet 15 mg  15 mg Oral Nightly    sacubitril-valsartan (ENTRESTO) 24-26 MG per tablet 1 tablet  1 tablet Oral BID    sennosides-docusate sodium (SENOKOT-S) 8.6-50 MG tablet 1 tablet  1 tablet Oral Nightly    tamsulosin (FLOMAX) capsule 0.4 mg  0.4 mg Oral QHS    sodium chloride flush 0.9 % injection 5-40 mL  5-40 mL IntraVENous 2 times per day    sodium chloride flush 0.9 % injection 5-40 mL  5-40 mL IntraVENous PRN    0.9 % sodium chloride infusion   IntraVENous PRN    potassium chloride (KLOR-CON) extended release tablet 40 mEq  40 mEq Oral PRN    Or    potassium bicarb-citric acid (EFFER-K) effervescent tablet 40 mEq  40 mEq Oral PRN    Or    potassium chloride 10 mEq/100 mL IVPB (Peripheral Line)  10 mEq IntraVENous PRN    magnesium sulfate 2000 mg in 50 mL IVPB premix  2,000 mg IntraVENous PRN    ondansetron (ZOFRAN-ODT) disintegrating tablet 4 mg  4

## 2024-05-29 NOTE — PLAN OF CARE
Problem: Discharge Planning  Goal: Discharge to home or other facility with appropriate resources  5/29/2024 1057 by Shilpi Osborn RN  Outcome: Progressing  5/29/2024 0021 by Uzma Carbone RN  Outcome: Progressing     Problem: Safety - Adult  Goal: Free from fall injury  5/29/2024 1057 by Shilpi Osborn RN  Outcome: Progressing  5/29/2024 0021 by Uzma Carbone RN  Outcome: Progressing     Problem: Pain  Goal: Verbalizes/displays adequate comfort level or baseline comfort level  5/29/2024 1057 by Shilpi Osborn RN  Outcome: Progressing  5/29/2024 0021 by Uzma Carbone RN  Outcome: Progressing  Flowsheets (Taken 5/28/2024 1600 by Shilpi Osborn RN)  Verbalizes/displays adequate comfort level or baseline comfort level: Encourage patient to monitor pain and request assistance     Problem: Chronic Conditions and Co-morbidities  Goal: Patient's chronic conditions and co-morbidity symptoms are monitored and maintained or improved  Outcome: Progressing     Problem: Skin/Tissue Integrity  Goal: Absence of new skin breakdown  Description: 1.  Monitor for areas of redness and/or skin breakdown  2.  Assess vascular access sites hourly  3.  Every 4-6 hours minimum:  Change oxygen saturation probe site  4.  Every 4-6 hours:  If on nasal continuous positive airway pressure, respiratory therapy assess nares and determine need for appliance change or resting period.  Outcome: Progressing

## 2024-06-04 NOTE — PROGRESS NOTES
Physician Progress Note      PATIENT:               JOSEFINA VIRGEN  [facility] #:                  [id number]  :                       1960  ADMIT DATE:       2024 10:07 PM  DISCH DATE:        2024 7:14 PM  RESPONDING  PROVIDER #:        Juan Antonio Parra MD          QUERY TEXT:    Patient admitted with syncope. Noted documentation of CAUTI. In order to   support the diagnosis of CAUTI, please include additional clinical indicators   in your documentation.  Or please document if the diagnosis of CAUTI has been   ruled out after further study.    The medical record reflects the following:  Risk Factors: UTI  syncope    Clinical Indicators:  ED   he also has an indwelling Sun catheter with extremely dark-colored urine.    Wife states that this was placed approximately 2 weeks ago by [place] [facility]   she denies him having any fevers, chills.  ED Working Diagnosis:  2. Urinary tract infection associated with indwelling urethral catheter,   initial encounter    H&P   Acute complicated cystitis  Chronic indwelling Sun  -Change Sun, Rocephin 1 g daily  -Follow urine cultures    urinc cx  >100,000 COLONIES/mL P  Culture  STAPHYLOCOCCUS SPECIES, COAGULASE NEGATIVE Abnormal    UA 24 23:11  Blood, Urine: LARGE !  Protein, UA: >300 !  Nitrite, Urine: Positive !  Leukocyte Esterase, Urine: MODERATE !  WBC, UA: 5-10  RBC, UA:   Epithelial Cells, UA: FEW  Bacteria, UA: 2+ !  Amorphous, UA: FEW !    HR   /111-120/78    Treatment:  cefTRIAXone (ROCEPHIN) 1,000 mg in sterile water 10 mL IV syringe  cefUROXime (CEFTIN) tablet 500 mg BID  sodium chloride 0.9 % bolus 1,000 mL    Thank you,  Luz Elena Arredondo RN CDI  CRCR  Clinical Documentation  [phone] or via Perfect Serve  [email or web address]  Options provided:  -- CAUTI present as evidenced by, Please document evidence.  -- CAUTI was ruled out  -- Other - I will add my own diagnosis  -- Disagree - Not applicable / Not valid  --

## 2024-06-06 ENCOUNTER — TELEPHONE (OUTPATIENT)
Age: 64
End: 2024-06-06

## 2024-06-06 NOTE — TELEPHONE ENCOUNTER
Received call that pts 14 day holter was returned back to [facility] with no data.  It was applied while pt was in [facility] hosp on 5/29/24.  Pt was discharged to NH per note so they possibly just sent it back.

## 2024-06-07 NOTE — TELEPHONE ENCOUNTER
Called HILDA Ovalles Mr. Clemens's fiance and she states he is wearing the monitor now and will send it back on June 12th when the time is up. Confirmed follow up.

## 2024-06-07 NOTE — TELEPHONE ENCOUNTER
Was unable to reach patient by phone, his phone number does not work and [facility]

## 2024-06-25 ENCOUNTER — HOSPITAL ENCOUNTER (EMERGENCY)
Facility: HOSPITAL | Age: 64
Discharge: NURSING, REHAB OR LONG-TERM CARE | End: 2024-06-25
Attending: EMERGENCY MEDICINE
Payer: MEDICARE

## 2024-06-25 VITALS
WEIGHT: 249.78 LBS | HEART RATE: 80 BPM | DIASTOLIC BLOOD PRESSURE: 79 MMHG | SYSTOLIC BLOOD PRESSURE: 113 MMHG | HEIGHT: 72 IN | TEMPERATURE: 98.4 F | RESPIRATION RATE: 16 BRPM | BODY MASS INDEX: 33.83 KG/M2 | OXYGEN SATURATION: 96 %

## 2024-06-25 DIAGNOSIS — R33.9 URINARY RETENTION: Primary | ICD-10-CM

## 2024-06-25 DIAGNOSIS — R31.9 URINARY TRACT INFECTION WITH HEMATURIA, SITE UNSPECIFIED: ICD-10-CM

## 2024-06-25 DIAGNOSIS — N39.0 URINARY TRACT INFECTION WITH HEMATURIA, SITE UNSPECIFIED: ICD-10-CM

## 2024-06-25 LAB
APPEARANCE UR: ABNORMAL
BACTERIA URNS QL MICRO: ABNORMAL /HPF
BILIRUB UR QL: NEGATIVE
CAOX CRY URNS QL MICRO: ABNORMAL
COLOR UR: ABNORMAL
EPITH CASTS URNS QL MICRO: ABNORMAL /LPF
GLUCOSE UR STRIP.AUTO-MCNC: NEGATIVE MG/DL
HGB UR QL STRIP: ABNORMAL
HYALINE CASTS URNS QL MICRO: ABNORMAL /LPF (ref 0–5)
KETONES UR QL STRIP.AUTO: ABNORMAL MG/DL
LEUKOCYTE ESTERASE UR QL STRIP.AUTO: ABNORMAL
NITRITE UR QL STRIP.AUTO: NEGATIVE
PH UR STRIP: 5 (ref 5–8)
PROT UR STRIP-MCNC: 30 MG/DL
RBC #/AREA URNS HPF: ABNORMAL /HPF (ref 0–5)
SP GR UR REFRACTOMETRY: >1.03 (ref 1–1.03)
SPECIMEN HOLD: NORMAL
UROBILINOGEN UR QL STRIP.AUTO: 1 EU/DL (ref 0.2–1)
WBC URNS QL MICRO: ABNORMAL /HPF (ref 0–4)

## 2024-06-25 PROCEDURE — 51702 INSERT TEMP BLADDER CATH: CPT

## 2024-06-25 PROCEDURE — 81001 URINALYSIS AUTO W/SCOPE: CPT

## 2024-06-25 PROCEDURE — 51798 US URINE CAPACITY MEASURE: CPT

## 2024-06-25 PROCEDURE — 87086 URINE CULTURE/COLONY COUNT: CPT

## 2024-06-25 PROCEDURE — 87186 SC STD MICRODIL/AGAR DIL: CPT

## 2024-06-25 PROCEDURE — 99283 EMERGENCY DEPT VISIT LOW MDM: CPT

## 2024-06-25 PROCEDURE — 6370000000 HC RX 637 (ALT 250 FOR IP): Performed by: EMERGENCY MEDICINE

## 2024-06-25 PROCEDURE — 87088 URINE BACTERIA CULTURE: CPT

## 2024-06-25 RX ORDER — ACETAMINOPHEN 500 MG
500 TABLET ORAL
Status: COMPLETED | OUTPATIENT
Start: 2024-06-25 | End: 2024-06-25

## 2024-06-25 RX ORDER — NITROFURANTOIN 25; 75 MG/1; MG/1
100 CAPSULE ORAL ONCE
Status: COMPLETED | OUTPATIENT
Start: 2024-06-25 | End: 2024-06-25

## 2024-06-25 RX ORDER — CEFUROXIME AXETIL 250 MG/1
500 TABLET ORAL ONCE
Status: DISCONTINUED | OUTPATIENT
Start: 2024-06-25 | End: 2024-06-25

## 2024-06-25 RX ORDER — NITROFURANTOIN 25; 75 MG/1; MG/1
100 CAPSULE ORAL 2 TIMES DAILY
Qty: 14 CAPSULE | Refills: 0 | Status: SHIPPED | OUTPATIENT
Start: 2024-06-25 | End: 2024-07-02

## 2024-06-25 RX ORDER — CEFUROXIME AXETIL 500 MG/1
500 TABLET ORAL 2 TIMES DAILY
Qty: 14 TABLET | Refills: 0 | Status: SHIPPED | OUTPATIENT
Start: 2024-06-25 | End: 2024-06-25

## 2024-06-25 RX ADMIN — ACETAMINOPHEN 500 MG: 500 TABLET ORAL at 02:09

## 2024-06-25 RX ADMIN — NITROFURANTOIN MONOHYDRATE/MACROCRYSTALS 100 MG: 75; 25 CAPSULE ORAL at 03:24

## 2024-06-25 ASSESSMENT — PAIN SCALES - GENERAL: PAINLEVEL_OUTOF10: 0

## 2024-06-25 ASSESSMENT — PAIN - FUNCTIONAL ASSESSMENT: PAIN_FUNCTIONAL_ASSESSMENT: 0-10

## 2024-06-25 NOTE — ED PROVIDER NOTES
[facility] EMERGENCY DEPT  EMERGENCY DEPARTMENT ENCOUNTER      Pt Name: Taj Clemens  MRN: [id number]  Birthdate 1960  Date of evaluation: 6/25/2024  Provider: Brandon Castorena MD    CHIEF COMPLAINT       Chief Complaint   Patient presents with    Urinary Retention       EMERGENCY DEPARTMENT COURSE and DIFFERENTIAL DIAGNOSIS/MDM:   Medical Decision Making  64-year-old male presenting ER by EMS from [facility] for urinary catheter issues.  Patient has history of requiring straight catheterizations for neurogenic bladder.  Had a chronic Sun previously however this was discontinued 1 week ago from in lieu of as needed straight catheterization.  However at their nursing facility they were having difficulty cathing the patient.  And only getting 9 cc of urine output.  Patient has been having worsening pain in the suprapubic region.  No reported fevers or chills.  Patient does have history of previous stroke with residual deficits with right-sided paralysis and aphasia.  Patient also has history of A-fib on anticoagulation, hypertension.  No report of any other symptoms no reported any fevers.  No back pain.  On exam patient with his chronic deficits but no new changes.  Suprapubic abdominal discomfort with mild distention.  No testicular swelling or tenderness to palpation.  No penile lesions or drainage.  Bladder scan with greater than 370 cc of urine.  In light of the symptoms Sun was placed.  Nursing staff reports difficulty with placing Sun however successfully placed.  In lieu of difficulties at the nursing facility and difficult Sun placement in ER will keep Sun in place.  Urine showing signs concerning for urinary tract infection based on comparison with urine cultures previously will start patient on Macrobid and send urine culture.  Patient symptoms improved after Sun was placed.  Patient stable for discharge    Amount and/or Complexity of Data Reviewed  Independent Historian: spouse and

## 2024-06-25 NOTE — ED TRIAGE NOTES
[facility]. Pt had a chronic pate that was taken out about a week ago, staff has been straight catheterizing pt. Last straight cath was 4 hours ago and pt had 9 ml out put.  Pt is eating and drinking okay   Endorses tenderness of the groin     Staff attempted to straight cath about an hour ago but was unsuccessful

## 2024-06-25 NOTE — ED NOTES
I have reviewed discharge instructions with the patient.  Opportunity for questions and clarification was provided.  The patient verbalized understanding.  Patient discharged out of the ED via ambulance stretcher with no difficulty and in stable condition.

## 2024-06-26 LAB — ECHO BSA: 2.4 M2

## 2024-06-27 LAB
BACTERIA SPEC CULT: ABNORMAL
CC UR VC: ABNORMAL
SERVICE CMNT-IMP: ABNORMAL

## 2024-07-08 ENCOUNTER — OFFICE VISIT (OUTPATIENT)
Age: 64
End: 2024-07-08
Payer: MEDICARE

## 2024-07-08 VITALS
BODY MASS INDEX: 33.88 KG/M2 | RESPIRATION RATE: 18 BRPM | DIASTOLIC BLOOD PRESSURE: 86 MMHG | OXYGEN SATURATION: 97 % | SYSTOLIC BLOOD PRESSURE: 126 MMHG | HEART RATE: 73 BPM | HEIGHT: 72 IN

## 2024-07-08 DIAGNOSIS — I48.3 TYPICAL ATRIAL FLUTTER (HCC): Primary | ICD-10-CM

## 2024-07-08 DIAGNOSIS — I67.9 CEREBRAL VASCULAR DISEASE: ICD-10-CM

## 2024-07-08 DIAGNOSIS — I50.22 CHRONIC SYSTOLIC CONGESTIVE HEART FAILURE (HCC): ICD-10-CM

## 2024-07-08 DIAGNOSIS — R94.30 CARDIAC LV EJECTION FRACTION 21-30%: ICD-10-CM

## 2024-07-08 DIAGNOSIS — I10 ESSENTIAL HYPERTENSION: ICD-10-CM

## 2024-07-08 DIAGNOSIS — I63.412 ACUTE STROKE DUE TO EMBOLISM OF LEFT MIDDLE CEREBRAL ARTERY (HCC): ICD-10-CM

## 2024-07-08 PROCEDURE — G8427 DOCREV CUR MEDS BY ELIG CLIN: HCPCS | Performed by: SPECIALIST

## 2024-07-08 PROCEDURE — 99214 OFFICE O/P EST MOD 30 MIN: CPT | Performed by: SPECIALIST

## 2024-07-08 PROCEDURE — 3074F SYST BP LT 130 MM HG: CPT | Performed by: SPECIALIST

## 2024-07-08 PROCEDURE — 1123F ACP DISCUSS/DSCN MKR DOCD: CPT | Performed by: SPECIALIST

## 2024-07-08 PROCEDURE — 3079F DIAST BP 80-89 MM HG: CPT | Performed by: SPECIALIST

## 2024-07-08 PROCEDURE — 1036F TOBACCO NON-USER: CPT | Performed by: SPECIALIST

## 2024-07-08 PROCEDURE — G8417 CALC BMI ABV UP PARAM F/U: HCPCS | Performed by: SPECIALIST

## 2024-07-08 PROCEDURE — 3017F COLORECTAL CA SCREEN DOC REV: CPT | Performed by: SPECIALIST

## 2024-07-08 RX ORDER — ERGOCALCIFEROL 1.25 MG/1
50000 CAPSULE ORAL WEEKLY
COMMUNITY

## 2024-07-08 RX ORDER — FLUTICASONE PROPIONATE 50 MCG
1 SPRAY, SUSPENSION (ML) NASAL DAILY
COMMUNITY

## 2024-07-08 RX ORDER — CEFUROXIME AXETIL 500 MG/1
TABLET ORAL 2 TIMES DAILY
COMMUNITY
Start: 2024-06-25

## 2024-07-08 ASSESSMENT — PATIENT HEALTH QUESTIONNAIRE - PHQ9
SUM OF ALL RESPONSES TO PHQ QUESTIONS 1-9: 0
2. FEELING DOWN, DEPRESSED OR HOPELESS: NOT AT ALL
SUM OF ALL RESPONSES TO PHQ9 QUESTIONS 1 & 2: 0
SUM OF ALL RESPONSES TO PHQ QUESTIONS 1-9: 0
SUM OF ALL RESPONSES TO PHQ QUESTIONS 1-9: 0
1. LITTLE INTEREST OR PLEASURE IN DOING THINGS: NOT AT ALL
SUM OF ALL RESPONSES TO PHQ QUESTIONS 1-9: 0

## 2024-07-08 NOTE — PROGRESS NOTES
Taj Clemens     1960       Viridiana Gillis MD, [facility]  Date of Visit-7/8/2024   PCP is No primary care provider on file.   [facility] [place]  [facility] [place]  HPI:  Taj Clemens is a 64 y.o. male   2 month follow up   Atrial flutter, acute systolic CHF, stroke, atherogenesis CT abdomen, hypertension, IGLESIA, amiodarone use  Today returns with no changes clinically from a cardiac point of view.  Does not appear to be having chest pain shortness of breath or edema.  Lungs are clear heart sounds are normal.  His echo in May showed return of LV function normal.  He was then rehospitalized with what sounds like more of a UTI or tramadol effect.  He had a monitor done on 5/29/2024 that for 14 days showed normal rhythm at sinus.  There were rare PACs and PVCs.  He returns today with no complaints accompanied by his significant other.        History of atrial fibrillation hypertension obesity  Had acute stroke and A-fib rate of 200s with RVR.   New systolic CHF. EF 25 then 40%  Patient got cardioversion   CT showed a left-sided MCA infarct large vessel occlusion of left MCA.    Patient was transferred to [facility] to ICU.  Agitated had hypotension requiring Rafael-Synephrine unable to recanalize occluded vessel.  Likely prior cardioembolic stroke due to A-fib and low EF   had cardioversion  placed on amiodarone found to have cardiomyopathy EF 25 to 30% troponin negative   repeat echo at 40% started on Entresto and Aldactone.    He had distal thrombus in the aorta  Echocardiogram May 2024 shows an EF of 60 to 65%    05/09/24  ECHO (TTE) COMPLETE     Left Ventricle: Normal left ventricular systolic function with a visually estimated EF of 60 - 65%. Left ventricle size is normal. Findings consistent with moderate concentric hypertrophy. Normal wall motion in limited views obtained    Pericardium: Trivial pericardial effusion present.  Assessment/Plan:     Patient Instructions

## 2024-07-20 ENCOUNTER — HOSPITAL ENCOUNTER (EMERGENCY)
Facility: HOSPITAL | Age: 64
Discharge: HOME | End: 2024-07-20
Attending: EMERGENCY MEDICINE
Payer: MEDICARE

## 2024-07-20 VITALS
DIASTOLIC BLOOD PRESSURE: 127 MMHG | HEIGHT: 72 IN | RESPIRATION RATE: 16 BRPM | TEMPERATURE: 98.9 F | SYSTOLIC BLOOD PRESSURE: 153 MMHG | BODY MASS INDEX: 33.86 KG/M2 | HEART RATE: 90 BPM | WEIGHT: 250 LBS | OXYGEN SATURATION: 100 %

## 2024-07-20 DIAGNOSIS — R33.9 URINARY RETENTION: Primary | ICD-10-CM

## 2024-07-20 DIAGNOSIS — T83.511A URINARY TRACT INFECTION ASSOCIATED WITH INDWELLING URETHRAL CATHETER, INITIAL ENCOUNTER (HCC): ICD-10-CM

## 2024-07-20 DIAGNOSIS — N39.0 URINARY TRACT INFECTION ASSOCIATED WITH INDWELLING URETHRAL CATHETER, INITIAL ENCOUNTER (HCC): ICD-10-CM

## 2024-07-20 LAB
APPEARANCE UR: ABNORMAL
BACTERIA URNS QL MICRO: ABNORMAL /HPF
BILIRUB UR QL: NEGATIVE
COLOR UR: ABNORMAL
EPITH CASTS URNS QL MICRO: ABNORMAL /LPF
GLUCOSE UR STRIP.AUTO-MCNC: NEGATIVE MG/DL
HGB UR QL STRIP: ABNORMAL
KETONES UR QL STRIP.AUTO: NEGATIVE MG/DL
LEUKOCYTE ESTERASE UR QL STRIP.AUTO: ABNORMAL
NITRITE UR QL STRIP.AUTO: NEGATIVE
PH UR STRIP: 6.5 (ref 5–8)
PROT UR STRIP-MCNC: ABNORMAL MG/DL
RBC #/AREA URNS HPF: ABNORMAL /HPF (ref 0–5)
SP GR UR REFRACTOMETRY: 1.02 (ref 1–1.03)
SPECIMEN HOLD: NORMAL
UROBILINOGEN UR QL STRIP.AUTO: 0.2 EU/DL (ref 0.2–1)
WBC URNS QL MICRO: >100 /HPF (ref 0–4)

## 2024-07-20 PROCEDURE — 51702 INSERT TEMP BLADDER CATH: CPT

## 2024-07-20 PROCEDURE — 87086 URINE CULTURE/COLONY COUNT: CPT

## 2024-07-20 PROCEDURE — 51798 US URINE CAPACITY MEASURE: CPT

## 2024-07-20 PROCEDURE — 81001 URINALYSIS AUTO W/SCOPE: CPT

## 2024-07-20 PROCEDURE — 99283 EMERGENCY DEPT VISIT LOW MDM: CPT

## 2024-07-20 PROCEDURE — 87077 CULTURE AEROBIC IDENTIFY: CPT

## 2024-07-20 PROCEDURE — 87186 SC STD MICRODIL/AGAR DIL: CPT

## 2024-07-20 PROCEDURE — 6370000000 HC RX 637 (ALT 250 FOR IP): Performed by: EMERGENCY MEDICINE

## 2024-07-20 RX ORDER — ACETAMINOPHEN 325 MG/1
325 TABLET ORAL
Status: COMPLETED | OUTPATIENT
Start: 2024-07-20 | End: 2024-07-20

## 2024-07-20 RX ORDER — NITROFURANTOIN 25; 75 MG/1; MG/1
100 CAPSULE ORAL 2 TIMES DAILY
Qty: 14 CAPSULE | Refills: 0 | Status: SHIPPED | OUTPATIENT
Start: 2024-07-20 | End: 2024-07-27

## 2024-07-20 RX ORDER — KETOROLAC TROMETHAMINE 30 MG/ML
15 INJECTION, SOLUTION INTRAMUSCULAR; INTRAVENOUS
Status: DISCONTINUED | OUTPATIENT
Start: 2024-07-20 | End: 2024-07-20

## 2024-07-20 RX ORDER — NITROFURANTOIN 25; 75 MG/1; MG/1
100 CAPSULE ORAL ONCE
Status: COMPLETED | OUTPATIENT
Start: 2024-07-20 | End: 2024-07-20

## 2024-07-20 RX ADMIN — NITROFURANTOIN MONOHYDRATE/MACROCRYSTALS 100 MG: 75; 25 CAPSULE ORAL at 03:36

## 2024-07-20 RX ADMIN — ACETAMINOPHEN 325 MG: 325 TABLET ORAL at 04:51

## 2024-07-20 ASSESSMENT — PAIN DESCRIPTION - LOCATION: LOCATION: ABDOMEN

## 2024-07-20 ASSESSMENT — LIFESTYLE VARIABLES
HOW MANY STANDARD DRINKS CONTAINING ALCOHOL DO YOU HAVE ON A TYPICAL DAY: PATIENT DOES NOT DRINK
HOW OFTEN DO YOU HAVE A DRINK CONTAINING ALCOHOL: NEVER

## 2024-07-20 ASSESSMENT — PAIN - FUNCTIONAL ASSESSMENT: PAIN_FUNCTIONAL_ASSESSMENT: 0-10

## 2024-07-20 ASSESSMENT — PAIN SCALES - GENERAL: PAINLEVEL_OUTOF10: 2

## 2024-07-20 NOTE — ED PROVIDER NOTES
MCG/ACT NASAL SPRAY    1 spray by Each Nostril route daily    GABAPENTIN (NEURONTIN) 100 MG CAPSULE    Take 3 capsules by mouth 3 times daily.    LIDOCAINE 4 % EXTERNAL PATCH    Place 1 patch onto the skin daily    MELATONIN 3 MG TABS TABLET    Take 2 tablets by mouth at bedtime    MIRTAZAPINE (REMERON) 15 MG TABLET    Take 1 tablet by mouth nightly    POLYETHYLENE GLYCOL (GLYCOLAX) 17 GM/SCOOP POWDER    Take 17 g by mouth 2 times daily as needed (constipation)    SACUBITRIL-VALSARTAN (ENTRESTO) 24-26 MG PER TABLET    Take 1 tablet by mouth 2 times daily    SENNA-DOCUSATE (PERICOLACE) 8.6-50 MG PER TABLET    Take 1 tablet by mouth at bedtime    TAMSULOSIN (FLOMAX) 0.4 MG CAPSULE    Take 1 capsule by mouth nightly    VITAMIN D (ERGOCALCIFEROL) 1.25 MG (61996 UT) CAPS CAPSULE    Take 1 capsule by mouth once a week    VITAMIN D 25 MCG (1000 UT) CAPS    Take by mouth daily       ALLERGIES     Penicillin g and Sulfa antibiotics    FAMILY HISTORY       Family History   Problem Relation Age of Onset    Hypertension Mother     Diabetes Mother     Cancer Father         Lung Cancer          SOCIAL HISTORY       Social History     Socioeconomic History    Marital status: Single   Tobacco Use    Smoking status: Former     Current packs/day: 0.25     Types: Cigarettes     Passive exposure: Never    Smokeless tobacco: Never   Vaping Use    Vaping Use: Never used   Substance and Sexual Activity    Alcohol use: Not Currently     Alcohol/week: 1.7 standard drinks of alcohol     Types: 2 Standard drinks or equivalent per week    Drug use: Never     Social Determinants of Health     Food Insecurity: No Food Insecurity (5/29/2024)    Hunger Vital Sign     Worried About Running Out of Food in the Last Year: Never true     Ran Out of Food in the Last Year: Never true   Transportation Needs: No Transportation Needs (5/29/2024)    PRAPARE - Transportation     Lack of Transportation (Medical): No     Lack of Transportation (Non-Medical): No    Housing Stability: Low Risk  (5/29/2024)    Housing Stability Vital Sign     Unable to Pay for Housing in the Last Year: No     Number of Places Lived in the Last Year: 2     Unstable Housing in the Last Year: No       SCREENINGS         Teresa Coma Scale  Eye Opening: Spontaneous  Best Verbal Response: Oriented  Best Motor Response: Obeys commands  McColl Coma Scale Score: 15                     CIWA Assessment  BP: (!) 153/127  Pulse: 90                 PHYSICAL EXAM       Vitals:    07/20/24 0235   BP: (!) 153/127   Pulse: 90   Resp: 16   Temp: 98.9 °F (37.2 °C)   TempSrc: Oral   SpO2: 100%   Weight: 113.4 kg (250 lb)   Height: 1.829 m (6')       Body mass index is 33.91 kg/m².    Physical Exam  Vitals and nursing note reviewed.   Constitutional:       General: He is not in acute distress.     Appearance: Normal appearance.   HENT:      Head: Normocephalic.      Mouth/Throat:      Pharynx: Oropharynx is clear.   Eyes:      Conjunctiva/sclera: Conjunctivae normal.   Cardiovascular:      Rate and Rhythm: Normal rate.   Pulmonary:      Effort: Pulmonary effort is normal. No respiratory distress.   Abdominal:      General: Abdomen is flat. There is no distension.      Palpations: Abdomen is soft.      Tenderness: There is abdominal tenderness in the suprapubic area. There is no right CVA tenderness, left CVA tenderness, guarding or rebound. Negative signs include Hampton's sign and McBurney's sign.   Musculoskeletal:         General: Normal range of motion.      Cervical back: Neck supple.   Skin:     General: Skin is warm.      Capillary Refill: Capillary refill takes less than 2 seconds.      Findings: No rash.   Neurological:      General: No focal deficit present.      Mental Status: He is alert and oriented to person, place, and time.         DIAGNOSTIC RESULTS     RADIOLOGY:   Interpretation per the Radiologist below, if available at the time of this note:    No orders to display        LABS:    Results for

## 2024-07-20 NOTE — ED TRIAGE NOTES
Pt presents with urinary retention after a pate removal this morning. Pt has been experiencing this since their stroke in 2022. Pain and pressure noted in the abd.

## 2024-07-20 NOTE — ED NOTES
Discharge instructions reviewed, discharge papers given and pt teaching completed. (1) prescription(s) discussed. Pt instructed to follow up with PCP and Urology regarding today's visit. Pt also instructed to report to ED if symptoms return, worsen, don't subside, and/or with onset of new symptoms.

## 2024-07-21 LAB
BACTERIA SPEC CULT: ABNORMAL
BACTERIA SPEC CULT: ABNORMAL
CC UR VC: ABNORMAL
SERVICE CMNT-IMP: ABNORMAL

## 2024-08-11 ENCOUNTER — APPOINTMENT (OUTPATIENT)
Facility: HOSPITAL | Age: 64
DRG: 439 | End: 2024-08-11
Payer: MEDICARE

## 2024-08-11 ENCOUNTER — HOSPITAL ENCOUNTER (INPATIENT)
Facility: HOSPITAL | Age: 64
LOS: 2 days | Discharge: NURSING, REHAB OR LONG-TERM CARE | DRG: 439 | End: 2024-08-13
Attending: EMERGENCY MEDICINE | Admitting: FAMILY MEDICINE
Payer: MEDICARE

## 2024-08-11 DIAGNOSIS — R07.9 ACUTE CHEST PAIN: ICD-10-CM

## 2024-08-11 DIAGNOSIS — K85.00 IDIOPATHIC ACUTE PANCREATITIS WITHOUT INFECTION OR NECROSIS: ICD-10-CM

## 2024-08-11 DIAGNOSIS — R20.0 NUMBNESS IN BOTH LEGS: Primary | ICD-10-CM

## 2024-08-11 DIAGNOSIS — R79.89 ABNORMAL LFTS: ICD-10-CM

## 2024-08-11 LAB
ALBUMIN SERPL-MCNC: 3.6 G/DL (ref 3.5–5)
ALBUMIN/GLOB SERPL: 0.9 (ref 1.1–2.2)
ALP SERPL-CCNC: 97 U/L (ref 45–117)
ALT SERPL-CCNC: 123 U/L (ref 12–78)
ANION GAP SERPL CALC-SCNC: 4 MMOL/L (ref 5–15)
APTT PPP: 29.6 SEC (ref 22.1–31)
AST SERPL-CCNC: 143 U/L (ref 15–37)
BASOPHILS # BLD: 0 K/UL (ref 0–0.1)
BASOPHILS NFR BLD: 1 % (ref 0–1)
BILIRUB SERPL-MCNC: 0.8 MG/DL (ref 0.2–1)
BUN SERPL-MCNC: 17 MG/DL (ref 6–20)
BUN/CREAT SERPL: 18 (ref 12–20)
CALCIUM SERPL-MCNC: 9.4 MG/DL (ref 8.5–10.1)
CHLORIDE SERPL-SCNC: 108 MMOL/L (ref 97–108)
CHOLEST SERPL-MCNC: 134 MG/DL
CK SERPL-CCNC: 100 U/L (ref 39–308)
CO2 SERPL-SCNC: 26 MMOL/L (ref 21–32)
CREAT SERPL-MCNC: 0.94 MG/DL (ref 0.7–1.3)
DIFFERENTIAL METHOD BLD: ABNORMAL
EOSINOPHIL # BLD: 0.3 K/UL (ref 0–0.4)
EOSINOPHIL NFR BLD: 5 % (ref 0–7)
ERYTHROCYTE [DISTWIDTH] IN BLOOD BY AUTOMATED COUNT: 16.7 % (ref 11.5–14.5)
GLOBULIN SER CALC-MCNC: 4 G/DL (ref 2–4)
GLUCOSE SERPL-MCNC: 83 MG/DL (ref 65–100)
HCT VFR BLD AUTO: 39.7 % (ref 36.6–50.3)
HDLC SERPL-MCNC: 70 MG/DL
HDLC SERPL: 1.9 (ref 0–5)
HGB BLD-MCNC: 13.1 G/DL (ref 12.1–17)
IMM GRANULOCYTES # BLD AUTO: 0 K/UL (ref 0–0.04)
IMM GRANULOCYTES NFR BLD AUTO: 0 % (ref 0–0.5)
INR PPP: 1 (ref 0.9–1.1)
LDLC SERPL CALC-MCNC: 55 MG/DL (ref 0–100)
LIPASE SERPL-CCNC: 116 U/L (ref 13–75)
LYMPHOCYTES # BLD: 2.7 K/UL (ref 0.8–3.5)
LYMPHOCYTES NFR BLD: 49 % (ref 12–49)
MAGNESIUM SERPL-MCNC: 2.2 MG/DL (ref 1.6–2.4)
MCH RBC QN AUTO: 30.8 PG (ref 26–34)
MCHC RBC AUTO-ENTMCNC: 33 G/DL (ref 30–36.5)
MCV RBC AUTO: 93.4 FL (ref 80–99)
MONOCYTES # BLD: 0.5 K/UL (ref 0–1)
MONOCYTES NFR BLD: 8 % (ref 5–13)
NEUTS SEG # BLD: 2 K/UL (ref 1.8–8)
NEUTS SEG NFR BLD: 37 % (ref 32–75)
NRBC # BLD: 0 K/UL (ref 0–0.01)
NRBC BLD-RTO: 0 PER 100 WBC
PLATELET # BLD AUTO: 330 K/UL (ref 150–400)
PMV BLD AUTO: 9.9 FL (ref 8.9–12.9)
POTASSIUM SERPL-SCNC: 4.2 MMOL/L (ref 3.5–5.1)
PROT SERPL-MCNC: 7.6 G/DL (ref 6.4–8.2)
PROTHROMBIN TIME: 10.7 SEC (ref 9–11.1)
RBC # BLD AUTO: 4.25 M/UL (ref 4.1–5.7)
SODIUM SERPL-SCNC: 138 MMOL/L (ref 136–145)
THERAPEUTIC RANGE: NORMAL SECS (ref 58–77)
TRIGL SERPL-MCNC: 45 MG/DL
TROPONIN I SERPL HS-MCNC: 7 NG/L (ref 0–76)
TROPONIN I SERPL HS-MCNC: 8 NG/L (ref 0–76)
TSH SERPL DL<=0.05 MIU/L-ACNC: 8.67 UIU/ML (ref 0.36–3.74)
VLDLC SERPL CALC-MCNC: 9 MG/DL
WBC # BLD AUTO: 5.5 K/UL (ref 4.1–11.1)

## 2024-08-11 PROCEDURE — 99285 EMERGENCY DEPT VISIT HI MDM: CPT

## 2024-08-11 PROCEDURE — 6360000004 HC RX CONTRAST MEDICATION: Performed by: EMERGENCY MEDICINE

## 2024-08-11 PROCEDURE — 2580000003 HC RX 258: Performed by: FAMILY MEDICINE

## 2024-08-11 PROCEDURE — 6370000000 HC RX 637 (ALT 250 FOR IP): Performed by: FAMILY MEDICINE

## 2024-08-11 PROCEDURE — 93005 ELECTROCARDIOGRAM TRACING: CPT | Performed by: EMERGENCY MEDICINE

## 2024-08-11 PROCEDURE — 85610 PROTHROMBIN TIME: CPT

## 2024-08-11 PROCEDURE — 80053 COMPREHEN METABOLIC PANEL: CPT

## 2024-08-11 PROCEDURE — 71045 X-RAY EXAM CHEST 1 VIEW: CPT

## 2024-08-11 PROCEDURE — 84443 ASSAY THYROID STIM HORMONE: CPT

## 2024-08-11 PROCEDURE — 80061 LIPID PANEL: CPT

## 2024-08-11 PROCEDURE — 83735 ASSAY OF MAGNESIUM: CPT

## 2024-08-11 PROCEDURE — 2060000000 HC ICU INTERMEDIATE R&B

## 2024-08-11 PROCEDURE — 83690 ASSAY OF LIPASE: CPT

## 2024-08-11 PROCEDURE — 2500000003 HC RX 250 WO HCPCS: Performed by: FAMILY MEDICINE

## 2024-08-11 PROCEDURE — 85025 COMPLETE CBC W/AUTO DIFF WBC: CPT

## 2024-08-11 PROCEDURE — 84484 ASSAY OF TROPONIN QUANT: CPT

## 2024-08-11 PROCEDURE — 74177 CT ABD & PELVIS W/CONTRAST: CPT

## 2024-08-11 PROCEDURE — 85730 THROMBOPLASTIN TIME PARTIAL: CPT

## 2024-08-11 PROCEDURE — 94761 N-INVAS EAR/PLS OXIMETRY MLT: CPT

## 2024-08-11 PROCEDURE — 82550 ASSAY OF CK (CPK): CPT

## 2024-08-11 PROCEDURE — 36415 COLL VENOUS BLD VENIPUNCTURE: CPT

## 2024-08-11 PROCEDURE — 6370000000 HC RX 637 (ALT 250 FOR IP): Performed by: EMERGENCY MEDICINE

## 2024-08-11 PROCEDURE — 6360000002 HC RX W HCPCS: Performed by: EMERGENCY MEDICINE

## 2024-08-11 PROCEDURE — 76705 ECHO EXAM OF ABDOMEN: CPT

## 2024-08-11 RX ORDER — AMIODARONE HYDROCHLORIDE 200 MG/1
200 TABLET ORAL DAILY
Status: DISCONTINUED | OUTPATIENT
Start: 2024-08-11 | End: 2024-08-13 | Stop reason: HOSPADM

## 2024-08-11 RX ORDER — METOPROLOL TARTRATE 1 MG/ML
5 INJECTION, SOLUTION INTRAVENOUS ONCE
Status: COMPLETED | OUTPATIENT
Start: 2024-08-11 | End: 2024-08-11

## 2024-08-11 RX ORDER — ENOXAPARIN SODIUM 100 MG/ML
30 INJECTION SUBCUTANEOUS 2 TIMES DAILY
Status: DISCONTINUED | OUTPATIENT
Start: 2024-08-11 | End: 2024-08-11

## 2024-08-11 RX ORDER — BACLOFEN 10 MG/1
10 TABLET ORAL 3 TIMES DAILY
Status: DISCONTINUED | OUTPATIENT
Start: 2024-08-11 | End: 2024-08-13 | Stop reason: HOSPADM

## 2024-08-11 RX ORDER — GABAPENTIN 300 MG/1
300 CAPSULE ORAL 3 TIMES DAILY
Status: DISCONTINUED | OUTPATIENT
Start: 2024-08-11 | End: 2024-08-12

## 2024-08-11 RX ORDER — SODIUM CHLORIDE 9 MG/ML
INJECTION, SOLUTION INTRAVENOUS PRN
Status: DISCONTINUED | OUTPATIENT
Start: 2024-08-11 | End: 2024-08-13 | Stop reason: HOSPADM

## 2024-08-11 RX ORDER — KETOROLAC TROMETHAMINE 15 MG/ML
15 INJECTION, SOLUTION INTRAMUSCULAR; INTRAVENOUS ONCE
Status: DISCONTINUED | OUTPATIENT
Start: 2024-08-11 | End: 2024-08-13 | Stop reason: HOSPADM

## 2024-08-11 RX ORDER — SENNA AND DOCUSATE SODIUM 50; 8.6 MG/1; MG/1
1 TABLET, FILM COATED ORAL NIGHTLY
Status: DISCONTINUED | OUTPATIENT
Start: 2024-08-11 | End: 2024-08-13 | Stop reason: HOSPADM

## 2024-08-11 RX ORDER — SODIUM CHLORIDE 0.9 % (FLUSH) 0.9 %
5-40 SYRINGE (ML) INJECTION PRN
Status: DISCONTINUED | OUTPATIENT
Start: 2024-08-11 | End: 2024-08-13 | Stop reason: HOSPADM

## 2024-08-11 RX ORDER — ONDANSETRON 2 MG/ML
4 INJECTION INTRAMUSCULAR; INTRAVENOUS EVERY 6 HOURS PRN
Status: DISCONTINUED | OUTPATIENT
Start: 2024-08-11 | End: 2024-08-13 | Stop reason: HOSPADM

## 2024-08-11 RX ORDER — ACETAMINOPHEN 500 MG
1000 TABLET ORAL
Status: COMPLETED | OUTPATIENT
Start: 2024-08-11 | End: 2024-08-11

## 2024-08-11 RX ORDER — MIRTAZAPINE 15 MG/1
15 TABLET, FILM COATED ORAL NIGHTLY
Status: DISCONTINUED | OUTPATIENT
Start: 2024-08-11 | End: 2024-08-13 | Stop reason: HOSPADM

## 2024-08-11 RX ORDER — ACETAMINOPHEN 325 MG/1
650 TABLET ORAL EVERY 6 HOURS PRN
Status: DISCONTINUED | OUTPATIENT
Start: 2024-08-11 | End: 2024-08-13 | Stop reason: HOSPADM

## 2024-08-11 RX ORDER — IOPAMIDOL 755 MG/ML
100 INJECTION, SOLUTION INTRAVASCULAR
Status: COMPLETED | OUTPATIENT
Start: 2024-08-11 | End: 2024-08-11

## 2024-08-11 RX ORDER — SODIUM CHLORIDE 0.9 % (FLUSH) 0.9 %
5-40 SYRINGE (ML) INJECTION EVERY 12 HOURS SCHEDULED
Status: DISCONTINUED | OUTPATIENT
Start: 2024-08-11 | End: 2024-08-13 | Stop reason: HOSPADM

## 2024-08-11 RX ORDER — TAMSULOSIN HYDROCHLORIDE 0.4 MG/1
0.4 CAPSULE ORAL
Status: DISCONTINUED | OUTPATIENT
Start: 2024-08-11 | End: 2024-08-13 | Stop reason: HOSPADM

## 2024-08-11 RX ORDER — ONDANSETRON 4 MG/1
4 TABLET, ORALLY DISINTEGRATING ORAL EVERY 8 HOURS PRN
Status: DISCONTINUED | OUTPATIENT
Start: 2024-08-11 | End: 2024-08-13 | Stop reason: HOSPADM

## 2024-08-11 RX ORDER — POLYETHYLENE GLYCOL 3350 17 G/17G
17 POWDER, FOR SOLUTION ORAL DAILY PRN
Status: DISCONTINUED | OUTPATIENT
Start: 2024-08-11 | End: 2024-08-13 | Stop reason: HOSPADM

## 2024-08-11 RX ORDER — ASPIRIN 81 MG/1
81 TABLET, CHEWABLE ORAL DAILY
Status: DISCONTINUED | OUTPATIENT
Start: 2024-08-11 | End: 2024-08-13 | Stop reason: HOSPADM

## 2024-08-11 RX ORDER — FINASTERIDE 5 MG/1
5 TABLET, FILM COATED ORAL DAILY
Status: DISCONTINUED | OUTPATIENT
Start: 2024-08-11 | End: 2024-08-13 | Stop reason: HOSPADM

## 2024-08-11 RX ORDER — SODIUM CHLORIDE 9 MG/ML
INJECTION, SOLUTION INTRAVENOUS CONTINUOUS
Status: DISCONTINUED | OUTPATIENT
Start: 2024-08-11 | End: 2024-08-13

## 2024-08-11 RX ORDER — LEVOTHYROXINE SODIUM 50 UG/1
50 TABLET ORAL
Status: DISCONTINUED | OUTPATIENT
Start: 2024-08-11 | End: 2024-08-13

## 2024-08-11 RX ORDER — ACETAMINOPHEN 650 MG/1
650 SUPPOSITORY RECTAL EVERY 6 HOURS PRN
Status: DISCONTINUED | OUTPATIENT
Start: 2024-08-11 | End: 2024-08-13 | Stop reason: HOSPADM

## 2024-08-11 RX ADMIN — GABAPENTIN 300 MG: 300 CAPSULE ORAL at 21:41

## 2024-08-11 RX ADMIN — SACUBITRIL AND VALSARTAN 1 TABLET: 24; 26 TABLET, FILM COATED ORAL at 21:42

## 2024-08-11 RX ADMIN — IOPAMIDOL 100 ML: 755 INJECTION, SOLUTION INTRAVENOUS at 03:09

## 2024-08-11 RX ADMIN — FINASTERIDE 5 MG: 5 TABLET, FILM COATED ORAL at 08:20

## 2024-08-11 RX ADMIN — METOPROLOL TARTRATE 5 MG: 5 INJECTION INTRAVENOUS at 17:00

## 2024-08-11 RX ADMIN — SODIUM CHLORIDE, PRESERVATIVE FREE 10 ML: 5 INJECTION INTRAVENOUS at 08:20

## 2024-08-11 RX ADMIN — BACLOFEN 10 MG: 10 TABLET ORAL at 21:42

## 2024-08-11 RX ADMIN — SODIUM CHLORIDE: 9 INJECTION, SOLUTION INTRAVENOUS at 09:37

## 2024-08-11 RX ADMIN — GABAPENTIN 300 MG: 300 CAPSULE ORAL at 08:20

## 2024-08-11 RX ADMIN — SACUBITRIL AND VALSARTAN 1 TABLET: 24; 26 TABLET, FILM COATED ORAL at 09:37

## 2024-08-11 RX ADMIN — SENNOSIDES AND DOCUSATE SODIUM 1 TABLET: 50; 8.6 TABLET ORAL at 21:42

## 2024-08-11 RX ADMIN — Medication 6 MG: at 21:41

## 2024-08-11 RX ADMIN — APIXABAN 5 MG: 5 TABLET, FILM COATED ORAL at 21:42

## 2024-08-11 RX ADMIN — MIRTAZAPINE 15 MG: 15 TABLET, FILM COATED ORAL at 21:41

## 2024-08-11 RX ADMIN — SODIUM CHLORIDE, PRESERVATIVE FREE 10 ML: 5 INJECTION INTRAVENOUS at 21:45

## 2024-08-11 RX ADMIN — GABAPENTIN 300 MG: 300 CAPSULE ORAL at 13:43

## 2024-08-11 RX ADMIN — BACLOFEN 10 MG: 10 TABLET ORAL at 13:43

## 2024-08-11 RX ADMIN — LEVOTHYROXINE SODIUM 50 MCG: 0.05 TABLET ORAL at 09:37

## 2024-08-11 RX ADMIN — TAMSULOSIN HYDROCHLORIDE 0.4 MG: 0.4 CAPSULE ORAL at 21:42

## 2024-08-11 RX ADMIN — AMIODARONE HYDROCHLORIDE 200 MG: 200 TABLET ORAL at 08:20

## 2024-08-11 RX ADMIN — ACETAMINOPHEN 500 MG: 500 TABLET ORAL at 02:24

## 2024-08-11 RX ADMIN — APIXABAN 5 MG: 5 TABLET, FILM COATED ORAL at 08:20

## 2024-08-11 RX ADMIN — ASPIRIN 81 MG: 81 TABLET, CHEWABLE ORAL at 08:20

## 2024-08-11 ASSESSMENT — PAIN - FUNCTIONAL ASSESSMENT: PAIN_FUNCTIONAL_ASSESSMENT: NONE - DENIES PAIN

## 2024-08-11 ASSESSMENT — PAIN SCALES - GENERAL
PAINLEVEL_OUTOF10: 0

## 2024-08-11 NOTE — ED PROVIDER NOTES
[facility] EMERGENCY DEPT  EMERGENCY DEPARTMENT ENCOUNTER      Pt Name: Taj Clemens  MRN: [id number]  Birthdate 1960  Date of evaluation: 8/11/2024  Provider: Marv Cody MD    CHIEF COMPLAINT       Chief Complaint   Patient presents with    Chest Pain    Shortness of Breath         HISTORY OF PRESENT ILLNESS   (Location/Symptom, Timing/Onset, Context/Setting, Quality, Duration, Modifying Factors, Severity)  Note limiting factors.   64-year-old male with a past medical history significant for CVA with right-sided deficit, expressive aphasia, chronic anticoagulation with Eliquis, allergic reaction, atrial fibrillation, constipation, hemorrhoid, hypertension, morbid obesity, chronic back pain, who presents the ER accompanied by wife who is the caregiver for evaluation for chest pain that she stated began this evening approximately 2 hours prior to arrival to the ER.  When asked, the patient denies any discomfort at this time he complains of epigastric pain.  Wife deny any nausea or vomiting, diarrhea constipation, fever, sore throat, cough or congestion, sick contact, recent travel.            Review of External Medical Records:     Nursing Notes were reviewed.    REVIEW OF SYSTEMS    (2-9 systems for level 4, 10 or more for level 5)     Review of Systems   All other systems reviewed and are negative.      Except as noted above the remainder of the review of systems was reviewed and negative.       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 AAA (30 mm).      Electronically signed by Devon Bright      XR CHEST PORTABLE   Final Result   Shallow volumes and left basilar atelectasis. Cardiomegaly.      Electronically signed by Devon Bright           LABS:  Labs Reviewed   CBC WITH AUTO DIFFERENTIAL - Abnormal; Notable for the following components:       Result Value    RDW 16.7 (*)     All other components within normal limits   COMPREHENSIVE METABOLIC PANEL - Abnormal; Notable for the following components:    Anion Gap 4 (*)      (*)      (*)     Albumin/Globulin Ratio 0.9 (*)     All other components within normal limits   TSH - Abnormal; Notable for the following components:    TSH, 3rd Generation 8.67 (*)     All other components within normal limits   LIPASE - Abnormal; Notable for the following components:    Lipase 116 (*)     All other components within normal limits   CK   TROPONIN   MAGNESIUM   APTT   PROTIME-INR       All other labs were within normal range or not returned as of this dictation.    EMERGENCY DEPARTMENT COURSE and DIFFERENTIAL DIAGNOSIS/MDM:   Vitals:    Vitals:    08/11/24 0530 08/11/24 0545 08/11/24 0600 08/11/24 0615   BP:       Pulse: 65 63 68 66   Resp: 13 14 19 13   Temp:       TempSrc:       SpO2: 96% 94% 96% 97%   Weight:       Height:               Medical Decision Making  Assessment: 64-year-old male with history of CVA with right-sided deficit with expressive aphasia who presents to the ER for evaluation for chest pain and epigastric discomfort with otherwise stable at this time.  The patient will need evaluation for ACS, pleurisy, GERD, pancreatitis, colitis, gallbladder disease rule out metabolic derangement including electrolyte abnormality and dehydration.    Plan: Lab/EKG/chest x-ray/CT scan of the abdomen pelvis/IV fluid/ultrasound abdomen/serial exam/ Monitor and Reevaluate.      Amount and/or Complexity of Data Reviewed  Labs: ordered.  Radiology: ordered.  ECG/medicine tests:

## 2024-08-11 NOTE — ED TRIAGE NOTES
Patient arrived via EMS from [facility] with complaints of chest pain and shortness of breath.  Chest pain started last night.  Saranya Hull gave one nitro with no effect.    Hx of stroke.  Right sided deficit.  Right arm is painful to the touch.  Patient has dysphasia.

## 2024-08-11 NOTE — ED NOTES
Bedside and Verbal shift change report given to Johnny (oncoming nurse) by Shikha (offgoing nurse). Report included the following information Nurse Handoff Report, Recent Results, and Quality Measures.

## 2024-08-11 NOTE — CARE COORDINATION
08/11/24  Care Management Assessment      Reason for Admission:     Chest pain [R07.9]  Acute chest pain [R07.9]  Abnormal LFTs [R79.89]  Idiopathic acute pancreatitis without infection or necrosis [K85.00]         RUR:  20%    Advance Directive: Full Code     [x] No AD on file.         Healthcare Decision Maker:   PAULA    Assessment:     08/11/24 1412   Service Assessment   Patient Orientation Alert and Oriented   History Provided By Significant Other  (patient friend-Romi provided history)   Primary Caregiver Other (Comment)  (staff-[facility])   Support Systems Family Members;Spouse/Significant Other   PCP Verified by CM Yes  (patient seen by PCP at [facility])   Prior Functional Level Assistance with the following:;Mobility   Current Functional Level Assistance with the following:;Mobility   Can patient return to prior living arrangement Yes   Ability to make needs known: Fair   Family able to assist with home care needs: No   Would you like for me to discuss the discharge plan with any other family members/significant others, and if so, who? Yes  (friend-Romi)   Financial Resources Medicare   Community Resources None         Insurer:   Active Insurance as of 8/11/2024       Primary Coverage       Payor Plan Insurance Group Employer/Plan Group    MEDICARE MEDICARE PART A AND B        Payor Address Payor Phone Number Payor Fax Number Effective Dates    [address] [address] [phone]  1/1/2023 - None Entered    [facility] [address]         Subscriber Name Subscriber Birth Date Member ID       JOSEFINA VIRGEN 1960 [id number]               Secondary Coverage       Payor Plan Insurance Group Employer/Plan Group    BCBS ANTHEM MEDICARE SUPP [id number]       Payor Plan Address Payor Plan Phone Number Payor Plan Fax Number Effective Dates    [address] [phone]  5/1/2023 - None Entered    [facility] [id number]         Subscriber Name Subscriber Birth Date Member ID       JOSEFINA VIRGEN 1960 [id number]

## 2024-08-11 NOTE — H&P
[facility]  [address] [place], [place] [address] [phone]    Formerly [facility] Adult  Hospitalist Group    History & Physical    Date of service: 8/11/2024    Patient name: Taj Clemens  MRN: [id number]  YOB: 1960  Age: 64 y.o.     Primary care provider:  No primary care provider on file.     Source of Information: patient, medical records                                  Chief complain: chest pain    History of present illness  Taj Clemens is a 64 y.o. male who presented with chest pain which started about 2 hours prior to arrival to the ED. Patient describes the pain as epigastric in locality and associated with some SOB. He denies any n/v, fever/chills, diarrhea/constipation, or sick contacts. He as given 1 nitroglycerine which did not help alleviate the pain. He has a history of CVA in the past with right sided deficits and expressive aphasia.     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 Authorizing Provider   cefUROXime (CEFTIN) 500 MG tablet Take by mouth 2 times daily  Patient not taking: Reported on 7/8/2024 6/25/24   Sherif Dao MD   fluticasone (FLONASE) 50 MCG/ACT nasal spray 1 spray by Each Nostril route daily    Sherif Dao MD   vitamin D (ERGOCALCIFEROL) 1.25 MG (43010 UT) CAPS capsule Take 1 capsule by mouth once a week    Sherif Dao MD   Cyanocobalamin    Elevated LFTs  -US abd shows mild hepatic steatosis, but no stones or other gallbladder abnormalities  -no alcohol use  -check trigs  -NPO, IVF, pain management with IV morphine prn for now  -consider GI consult if no improvement     Chest pain  -likely non cardiac, due to pancreatitis above   -will check one more trop    Hypothyroidism  Elevated TSH  -not on thyroid meds at home  -start low dose synthroid     Aflutter  Chronic systolic CHF  HTN  -in march pt had EF 25%, but recovered to 60-65% in may  -cont entresto, asa, statin (hold for now due to elevated LFTs)  -cont amiodarone and eliquis    BPH  -cont finasteride and tamsulosin    Depression  -cont remeron    Hx CVA  -cont asa/statin      Diet: NPO  Activity: as tolerated   DVT prophylaxis: eliquis   Isolation precautions: none       Signed by: Kelly Dye MD    August 11, 2024 at 7:33 AM

## 2024-08-12 LAB
ALBUMIN SERPL-MCNC: 3.2 G/DL (ref 3.5–5)
ALBUMIN/GLOB SERPL: 0.9 (ref 1.1–2.2)
ALP SERPL-CCNC: 78 U/L (ref 45–117)
ALT SERPL-CCNC: 73 U/L (ref 12–78)
ANION GAP SERPL CALC-SCNC: 5 MMOL/L (ref 5–15)
AST SERPL-CCNC: 29 U/L (ref 15–37)
BASOPHILS # BLD: 0 K/UL (ref 0–0.1)
BASOPHILS NFR BLD: 1 % (ref 0–1)
BILIRUB SERPL-MCNC: 1 MG/DL (ref 0.2–1)
BUN SERPL-MCNC: 9 MG/DL (ref 6–20)
BUN/CREAT SERPL: 11 (ref 12–20)
CALCIUM SERPL-MCNC: 9.5 MG/DL (ref 8.5–10.1)
CHLORIDE SERPL-SCNC: 109 MMOL/L (ref 97–108)
CO2 SERPL-SCNC: 24 MMOL/L (ref 21–32)
CREAT SERPL-MCNC: 0.83 MG/DL (ref 0.7–1.3)
DIFFERENTIAL METHOD BLD: ABNORMAL
EKG ATRIAL RATE: 64 BPM
EKG DIAGNOSIS: NORMAL
EKG P AXIS: 30 DEGREES
EKG P-R INTERVAL: 170 MS
EKG Q-T INTERVAL: 432 MS
EKG QRS DURATION: 84 MS
EKG QTC CALCULATION (BAZETT): 445 MS
EKG R AXIS: -8 DEGREES
EKG T AXIS: 5 DEGREES
EKG VENTRICULAR RATE: 64 BPM
EOSINOPHIL # BLD: 0.3 K/UL (ref 0–0.4)
EOSINOPHIL NFR BLD: 5 % (ref 0–7)
ERYTHROCYTE [DISTWIDTH] IN BLOOD BY AUTOMATED COUNT: 16.5 % (ref 11.5–14.5)
GLOBULIN SER CALC-MCNC: 3.6 G/DL (ref 2–4)
GLUCOSE BLD STRIP.AUTO-MCNC: 110 MG/DL (ref 65–117)
GLUCOSE BLD STRIP.AUTO-MCNC: 67 MG/DL (ref 65–117)
GLUCOSE BLD STRIP.AUTO-MCNC: 71 MG/DL (ref 65–117)
GLUCOSE BLD STRIP.AUTO-MCNC: 84 MG/DL (ref 65–117)
GLUCOSE SERPL-MCNC: 87 MG/DL (ref 65–100)
HCT VFR BLD AUTO: 34.3 % (ref 36.6–50.3)
HGB BLD-MCNC: 11.2 G/DL (ref 12.1–17)
IMM GRANULOCYTES # BLD AUTO: 0 K/UL (ref 0–0.04)
IMM GRANULOCYTES NFR BLD AUTO: 0 % (ref 0–0.5)
LYMPHOCYTES # BLD: 2.6 K/UL (ref 0.8–3.5)
LYMPHOCYTES NFR BLD: 56 % (ref 12–49)
MCH RBC QN AUTO: 30.4 PG (ref 26–34)
MCHC RBC AUTO-ENTMCNC: 32.7 G/DL (ref 30–36.5)
MCV RBC AUTO: 93 FL (ref 80–99)
MONOCYTES # BLD: 0.4 K/UL (ref 0–1)
MONOCYTES NFR BLD: 8 % (ref 5–13)
NEUTS SEG # BLD: 1.4 K/UL (ref 1.8–8)
NEUTS SEG NFR BLD: 30 % (ref 32–75)
NRBC # BLD: 0 K/UL (ref 0–0.01)
NRBC BLD-RTO: 0 PER 100 WBC
PLATELET # BLD AUTO: 290 K/UL (ref 150–400)
PMV BLD AUTO: 9.9 FL (ref 8.9–12.9)
POTASSIUM SERPL-SCNC: 3.8 MMOL/L (ref 3.5–5.1)
PROT SERPL-MCNC: 6.8 G/DL (ref 6.4–8.2)
RBC # BLD AUTO: 3.69 M/UL (ref 4.1–5.7)
SERVICE CMNT-IMP: NORMAL
SODIUM SERPL-SCNC: 138 MMOL/L (ref 136–145)
WBC # BLD AUTO: 4.6 K/UL (ref 4.1–11.1)

## 2024-08-12 PROCEDURE — 80053 COMPREHEN METABOLIC PANEL: CPT

## 2024-08-12 PROCEDURE — 94761 N-INVAS EAR/PLS OXIMETRY MLT: CPT

## 2024-08-12 PROCEDURE — 2060000000 HC ICU INTERMEDIATE R&B

## 2024-08-12 PROCEDURE — 36415 COLL VENOUS BLD VENIPUNCTURE: CPT

## 2024-08-12 PROCEDURE — 85025 COMPLETE CBC W/AUTO DIFF WBC: CPT

## 2024-08-12 PROCEDURE — 82962 GLUCOSE BLOOD TEST: CPT

## 2024-08-12 PROCEDURE — 6370000000 HC RX 637 (ALT 250 FOR IP): Performed by: FAMILY MEDICINE

## 2024-08-12 PROCEDURE — 93010 ELECTROCARDIOGRAM REPORT: CPT | Performed by: STUDENT IN AN ORGANIZED HEALTH CARE EDUCATION/TRAINING PROGRAM

## 2024-08-12 PROCEDURE — 2580000003 HC RX 258: Performed by: FAMILY MEDICINE

## 2024-08-12 RX ORDER — AMLODIPINE BESYLATE 5 MG/1
5 TABLET ORAL DAILY
Status: DISCONTINUED | OUTPATIENT
Start: 2024-08-12 | End: 2024-08-13 | Stop reason: HOSPADM

## 2024-08-12 RX ORDER — LIDOCAINE 4 G/G
1 PATCH TOPICAL DAILY
Status: DISCONTINUED | OUTPATIENT
Start: 2024-08-12 | End: 2024-08-13

## 2024-08-12 RX ADMIN — AMIODARONE HYDROCHLORIDE 200 MG: 200 TABLET ORAL at 09:52

## 2024-08-12 RX ADMIN — DICLOFENAC SODIUM 2 G: 10 GEL TOPICAL at 21:56

## 2024-08-12 RX ADMIN — TAMSULOSIN HYDROCHLORIDE 0.4 MG: 0.4 CAPSULE ORAL at 21:03

## 2024-08-12 RX ADMIN — GABAPENTIN 400 MG: 300 CAPSULE ORAL at 15:07

## 2024-08-12 RX ADMIN — MIRTAZAPINE 15 MG: 15 TABLET, FILM COATED ORAL at 20:53

## 2024-08-12 RX ADMIN — Medication 6 MG: at 20:53

## 2024-08-12 RX ADMIN — SACUBITRIL AND VALSARTAN 1 TABLET: 24; 26 TABLET, FILM COATED ORAL at 09:52

## 2024-08-12 RX ADMIN — LEVOTHYROXINE SODIUM 50 MCG: 0.05 TABLET ORAL at 09:53

## 2024-08-12 RX ADMIN — GABAPENTIN 300 MG: 300 CAPSULE ORAL at 09:53

## 2024-08-12 RX ADMIN — FINASTERIDE 5 MG: 5 TABLET, FILM COATED ORAL at 09:52

## 2024-08-12 RX ADMIN — ASPIRIN 81 MG: 81 TABLET, CHEWABLE ORAL at 09:52

## 2024-08-12 RX ADMIN — SODIUM CHLORIDE: 9 INJECTION, SOLUTION INTRAVENOUS at 01:36

## 2024-08-12 RX ADMIN — APIXABAN 5 MG: 5 TABLET, FILM COATED ORAL at 20:58

## 2024-08-12 RX ADMIN — BACLOFEN 10 MG: 10 TABLET ORAL at 20:59

## 2024-08-12 RX ADMIN — APIXABAN 5 MG: 5 TABLET, FILM COATED ORAL at 09:53

## 2024-08-12 RX ADMIN — SODIUM CHLORIDE, PRESERVATIVE FREE 10 ML: 5 INJECTION INTRAVENOUS at 09:53

## 2024-08-12 RX ADMIN — GABAPENTIN 400 MG: 300 CAPSULE ORAL at 20:53

## 2024-08-12 RX ADMIN — AMLODIPINE BESYLATE 5 MG: 5 TABLET ORAL at 12:19

## 2024-08-12 RX ADMIN — SACUBITRIL AND VALSARTAN 1 TABLET: 24; 26 TABLET, FILM COATED ORAL at 20:59

## 2024-08-12 RX ADMIN — SODIUM CHLORIDE: 9 INJECTION, SOLUTION INTRAVENOUS at 12:20

## 2024-08-12 RX ADMIN — SODIUM CHLORIDE, PRESERVATIVE FREE 10 ML: 5 INJECTION INTRAVENOUS at 21:00

## 2024-08-12 RX ADMIN — BACLOFEN 10 MG: 10 TABLET ORAL at 15:07

## 2024-08-12 RX ADMIN — BACLOFEN 10 MG: 10 TABLET ORAL at 09:52

## 2024-08-12 RX ADMIN — SENNOSIDES AND DOCUSATE SODIUM 1 TABLET: 50; 8.6 TABLET ORAL at 20:53

## 2024-08-12 ASSESSMENT — PAIN SCALES - GENERAL
PAINLEVEL_OUTOF10: 2
PAINLEVEL_OUTOF10: 4
PAINLEVEL_OUTOF10: 7
PAINLEVEL_OUTOF10: 0
PAINLEVEL_OUTOF10: 4
PAINLEVEL_OUTOF10: 5

## 2024-08-12 ASSESSMENT — PAIN DESCRIPTION - LOCATION
LOCATION: ARM
LOCATION: GENERALIZED
LOCATION: ARM

## 2024-08-12 ASSESSMENT — PAIN DESCRIPTION - DESCRIPTORS
DESCRIPTORS: ACHING

## 2024-08-12 ASSESSMENT — PAIN SCALES - WONG BAKER: WONGBAKER_NUMERICALRESPONSE: HURTS A LITTLE BIT

## 2024-08-12 ASSESSMENT — PAIN DESCRIPTION - ORIENTATION
ORIENTATION: RIGHT
ORIENTATION: RIGHT

## 2024-08-12 NOTE — PROGRESS NOTES
Patient's SpO2 drops to the 70s-80s while asleep.  When the NC touched his nose, he refused by batting it away and saying, \"No.\"  Both this RN and Marlin Harrison RT have educated the patient and explained the importance of keeping his oxygen level up, but he is still refusing. Provider paged to possibly come bedside to talk to patient.

## 2024-08-12 NOTE — PROGRESS NOTES
[facility]  [address] [place] [address] [phone]    Formerly [facility] Adult  Hospitalist Group                                                                                          Hospitalist Progress Note  Kelly Dye MD        Date of Service:  2024  NAME:  Taj Clemens  :  1960  MRN:  [id number]      Admission Summary:   64-year-old male with a recent CVA is admitted for idiopathic pancreatitis    Interval history / Subjective:   Feels much better today.  Denies any nausea or abdominal pain.  Tolerating clears     Assessment & Plan:     Acute pancreatitis   Elevated LFTs  -US abd shows mild hepatic steatosis, but no stones or other gallbladder abnormalities  -no alcohol use  -Drinks normal  -Tolerating clear liquids, advance to full's and then to regular diet  -IV Dilaudid as needed for pain     Chest pain  -likely non cardiac, due to pancreatitis above   -Troponins flat     Hypothyroidism  Elevated TSH  -not on thyroid meds at home  -start low dose synthroid      Aflutter  Chronic systolic CHF  HTN  -in march pt had EF 25%, but recovered to 60-65% in may  -cont entresto, asa, statin (hold for now due to elevated LFTs)  -cont amiodarone and eliquis  -Add low-dose amlodipine for better BP control     BPH  -cont finasteride and tamsulosin     Depression  -cont remeron     Hx CVA  -cont asa/statin  -Patient has increased pain due to spasticity and also \"nerves waking up\"  -Increase gabapentin for better pain control  -Also restarted lidocaine patch/Voltaren gel per wife's request.  Continue baclofen as needed and recommend a lower Tylenol dose going forward (was previously taking 1000 mg 2-3 times a day)    Nocturnal hypoxia  -Patient likely has sleep apnea  -Will give oxygen at night for now and advised patient's wife to schedule a sleep study at some point    Outisde Records, prior notes, labs, radiology, and medications reviewed

## 2024-08-12 NOTE — PROGRESS NOTES
Notify MD Dye in person telemetry orders  today. MD Dye states no need to continue with tele monitoring.

## 2024-08-12 NOTE — PROGRESS NOTES
Notify MD Dye by phone patient blood sugar of 68. Notify apple juice given. @16:41 Acknowledge information. MD Dye states patient started patient on new BP medication so may adjusting to that. No further orders received.

## 2024-08-12 NOTE — PLAN OF CARE
Problem: Discharge Planning  Goal: Discharge to home or other facility with appropriate resources  Outcome: Progressing  Flowsheets (Taken 8/11/2024 2142)  Discharge to home or other facility with appropriate resources: Identify barriers to discharge with patient and caregiver     Problem: Safety - Adult  Goal: Free from fall injury  Outcome: Progressing     Problem: ABCDS Injury Assessment  Goal: Absence of physical injury  Outcome: Progressing

## 2024-08-12 NOTE — CARE COORDINATION
Care Management Progress Note:   Update from [facility], per SNF patient has used all his skilled days and can't return to continue rehab at the SNF level. CM awaiting therapy recs and clinical input for alternative dc plan. CM following.  ______________________  Luz Elena WILCOX, RN  Care Management  8/12/2024  4:21 PM

## 2024-08-13 VITALS
HEIGHT: 72 IN | TEMPERATURE: 98.1 F | OXYGEN SATURATION: 100 % | RESPIRATION RATE: 18 BRPM | HEART RATE: 79 BPM | BODY MASS INDEX: 33.74 KG/M2 | SYSTOLIC BLOOD PRESSURE: 164 MMHG | WEIGHT: 249.12 LBS | DIASTOLIC BLOOD PRESSURE: 110 MMHG

## 2024-08-13 PROCEDURE — 94761 N-INVAS EAR/PLS OXIMETRY MLT: CPT

## 2024-08-13 PROCEDURE — 97530 THERAPEUTIC ACTIVITIES: CPT

## 2024-08-13 PROCEDURE — 97161 PT EVAL LOW COMPLEX 20 MIN: CPT

## 2024-08-13 PROCEDURE — 6370000000 HC RX 637 (ALT 250 FOR IP): Performed by: FAMILY MEDICINE

## 2024-08-13 PROCEDURE — 97166 OT EVAL MOD COMPLEX 45 MIN: CPT

## 2024-08-13 PROCEDURE — 97535 SELF CARE MNGMENT TRAINING: CPT

## 2024-08-13 PROCEDURE — 6370000000 HC RX 637 (ALT 250 FOR IP): Performed by: INTERNAL MEDICINE

## 2024-08-13 RX ORDER — GABAPENTIN 400 MG/1
400 CAPSULE ORAL 3 TIMES DAILY
Qty: 90 CAPSULE | Refills: 0 | Status: SHIPPED | OUTPATIENT
Start: 2024-08-13 | End: 2024-08-13

## 2024-08-13 RX ORDER — LIDOCAINE 4 G/G
2 PATCH TOPICAL DAILY
Status: DISCONTINUED | OUTPATIENT
Start: 2024-08-13 | End: 2024-08-13 | Stop reason: HOSPADM

## 2024-08-13 RX ORDER — OXYCODONE HYDROCHLORIDE 5 MG/1
5 TABLET ORAL EVERY 4 HOURS PRN
Status: DISCONTINUED | OUTPATIENT
Start: 2024-08-13 | End: 2024-08-13 | Stop reason: HOSPADM

## 2024-08-13 RX ORDER — GABAPENTIN 400 MG/1
400 CAPSULE ORAL 3 TIMES DAILY
Qty: 90 CAPSULE | Refills: 0 | Status: SHIPPED | OUTPATIENT
Start: 2024-08-13 | End: 2025-01-09

## 2024-08-13 RX ORDER — ACETAMINOPHEN 325 MG/1
650 TABLET ORAL EVERY 6 HOURS PRN
Qty: 30 TABLET | Refills: 0 | Status: SHIPPED | OUTPATIENT
Start: 2024-08-13

## 2024-08-13 RX ORDER — ATORVASTATIN CALCIUM 10 MG/1
10 TABLET, FILM COATED ORAL NIGHTLY
Qty: 30 TABLET | Refills: 0 | Status: SHIPPED | OUTPATIENT
Start: 2024-08-13 | End: 2025-01-09

## 2024-08-13 RX ORDER — AMLODIPINE BESYLATE 5 MG/1
5 TABLET ORAL DAILY
Qty: 30 TABLET | Refills: 0 | Status: SHIPPED | OUTPATIENT
Start: 2024-08-14 | End: 2025-01-09

## 2024-08-13 RX ORDER — LEVOTHYROXINE SODIUM 50 UG/1
50 TABLET ORAL
Status: DISCONTINUED | OUTPATIENT
Start: 2024-08-13 | End: 2024-08-13 | Stop reason: HOSPADM

## 2024-08-13 RX ORDER — LIDOCAINE 4 G/G
2 PATCH TOPICAL DAILY
Qty: 60 PATCH | Refills: 0 | Status: SHIPPED | OUTPATIENT
Start: 2024-08-13

## 2024-08-13 RX ORDER — AMIODARONE HYDROCHLORIDE 200 MG/1
200 TABLET ORAL DAILY
Qty: 30 TABLET | Refills: 0 | Status: SHIPPED | OUTPATIENT
Start: 2024-08-13 | End: 2025-01-09 | Stop reason: SDUPTHER

## 2024-08-13 RX ADMIN — AMLODIPINE BESYLATE 5 MG: 5 TABLET ORAL at 09:45

## 2024-08-13 RX ADMIN — APIXABAN 5 MG: 5 TABLET, FILM COATED ORAL at 09:46

## 2024-08-13 RX ADMIN — BACLOFEN 10 MG: 10 TABLET ORAL at 15:24

## 2024-08-13 RX ADMIN — ASPIRIN 81 MG: 81 TABLET, CHEWABLE ORAL at 09:45

## 2024-08-13 RX ADMIN — SACUBITRIL AND VALSARTAN 1 TABLET: 24; 26 TABLET, FILM COATED ORAL at 09:46

## 2024-08-13 RX ADMIN — FINASTERIDE 5 MG: 5 TABLET, FILM COATED ORAL at 09:45

## 2024-08-13 RX ADMIN — AMIODARONE HYDROCHLORIDE 200 MG: 200 TABLET ORAL at 09:46

## 2024-08-13 RX ADMIN — GABAPENTIN 400 MG: 300 CAPSULE ORAL at 09:45

## 2024-08-13 RX ADMIN — GABAPENTIN 400 MG: 300 CAPSULE ORAL at 15:24

## 2024-08-13 RX ADMIN — BACLOFEN 10 MG: 10 TABLET ORAL at 09:45

## 2024-08-13 ASSESSMENT — PAIN SCALES - GENERAL: PAINLEVEL_OUTOF10: 4

## 2024-08-13 ASSESSMENT — PAIN DESCRIPTION - LOCATION: LOCATION: GENERALIZED

## 2024-08-13 NOTE — PROGRESS NOTES
Report called to BRANDIE Monsalve at [facility], Pt transported by [facility] to Room 522-A. Girlfriend Xi at bedside and aware of transfer. Pt transported with lidocaine patch to R knee and L hip. Hard copy prescription for gabapentin sent in/along with D/C packet  with transport.

## 2024-08-13 NOTE — DISCHARGE SUMMARY
Physician Discharge Summary     Patient ID:  Taj Clemens  [id number]  64 y.o.  1960    Admit date: 8/11/2024    Discharge date and time: 8/13/2024    Admission Diagnoses: Chest pain [R07.9]  Acute chest pain [R07.9]  Abnormal LFTs [R79.89]  Idiopathic acute pancreatitis without infection or necrosis [K85.00]    Discharge Diagnoses/Hospital Course   Mr. Clemens is a 65 yo male with PMH of HTN, sCHF, aflutter on anticoagulation, CVA, BPH and depression admitted for pancreatitis and elevated LFT's     Acute pancreatitis   Elevated LFTs   -diagnosed by admitting MD. Lipase only slight elevated and no radiographic e/o pancreatitis. CT ab/pelvis with a triple AA measuring 30mm but no e/o acute process. Clinically improved with conservative measures. TG WNL. US with steatosis but no e/o gallstones or acute process      Chest pain - CE negative. CT without acute process     Hypothyroidism - TSH slightly elevated. Consider levothyroxine v. Repeat TFT's in 6 weeks      Aflutter/Chronic systolic CHF/HTN - EF previously 25% now improved to 60-65%. On entresto. Amlodipine added inpt. No BB as HR already low and likely would not tolerate. No Jardiance for now as EF improved. For a-fib on amio and eliquis.      BPH - on finasteride and tamsulosin     Depression - on remeron     Hx CVA - on eliquis in the setting of a-fib. Statin dose decreased as prior LDL and current LDL at goal of <70 and LFT's slightly increased on admission from statin. Would repeat lipid panel and LFT's in 6 weeks to determine if able to tolerate      Nocturnal hypoxia - outpt sleep study     PCP: No primary care provider on file.     Consults: none    Discharge Exam:  Vitals:    08/13/24 0342 08/13/24 0801 08/13/24 0945 08/13/24 1116   BP: 127/79 (!) 145/107 (!) 145/107 (!) 139/97   Pulse:  68  67   Resp:  15  13   Temp: 98.1 °F (36.7 °C) 98.1 °F (36.7 °C)  97.9 °F (36.6 °C)   TempSrc: Oral Axillary  Oral   SpO2:  95%  98%   Weight:       Height:

## 2024-08-13 NOTE — PROGRESS NOTES
To assist primary nurse with discharge, I have completed the AVS med-updates and resolved the Care Plans and Education. The AVS was printed and placed on the chart for the nurse.

## 2024-08-13 NOTE — PLAN OF CARE
Problem: Discharge Planning  Goal: Discharge to home or other facility with appropriate resources  Outcome: Progressing     Problem: Safety - Adult  Goal: Free from fall injury  Outcome: Progressing     Problem: ABCDS Injury Assessment  Goal: Absence of physical injury  Outcome: Progressing     Problem: Chronic Conditions and Co-morbidities  Goal: Patient's chronic conditions and co-morbidity symptoms are monitored and maintained or improved  Outcome: Progressing     Problem: Pain  Goal: Verbalizes/displays adequate comfort level or baseline comfort level  Outcome: Progressing

## 2024-08-13 NOTE — PLAN OF CARE
Problem: Physical Therapy - Adult  Goal: By Discharge: Performs mobility at highest level of function for planned discharge setting.  See evaluation for individualized goals.  Description: FUNCTIONAL STATUS PRIOR TO ADMISSION: Patient is admitted from [facility] where he was working with PT on sitting balance and standing. Patient was fully independent and working full time before having a CVA in April 2024, discharged to [facility] and then transitioned to SNF where he has been since.     HOME SUPPORT PRIOR TO ADMISSION: Patient is supported by his SO who provided his social history on evaluation. Patient has been requiring assist for all ADL's since April.    Physical Therapy Goals  Initiated 8/13/2024  1.  Patient will roll side to side in bed with minimal assistance within 7 day(s).    2.  Patient will perform sit to stand with maximal assistance within 7 day(s).  3.  Patient will move from supine to sit and sit to supine with moderate assistance using the least restrictive device within 7 day(s).  4.  Patient will participate in seated LE exercise for 5 minutes with CGA within 7 day(s).    Outcome: Not Progressing     PHYSICAL THERAPY EVALUATION    Patient: Taj Clemens (64 y.o. male)  Date: 8/13/2024  Primary Diagnosis: Chest pain [R07.9]  Acute chest pain [R07.9]  Abnormal LFTs [R79.89]  Idiopathic acute pancreatitis without infection or necrosis [K85.00]       Precautions: Restrictions/Precautions: Fall Risk                      ASSESSMENT :   DEFICITS/IMPAIRMENTS:   The patient is limited by R hemiparesis, aphasia, decreased functional mobility, independence in ADLs, activity tolerance, cognition, command following, balance, and increased pain levels which are deficits s/p CVA in April 2024. Now admitted with acute pancreatitis.     Based on the impairments listed above, patient presents below previously independent baseline and near most recent functional baseline that he had achieved in rehab. Patient  Hospitalization in Select Patient Groups: A Retrospective, Observational Study. Arch Rehabil Res Clin Transl. 2022 Jul 16;4(3):898713. doi: [email or web address]/[email or web address].2022.925955. PMID: 41783765; PMCID: [id number].  4. Antoni PURI, Neema S, Rosita Fonseca. AM-PAC Short Forms Manual 4.0. Revised 2/2020.                                                                                                                                                                                                                               Pain Rating:  Unable to quantify  Pain Intervention(s):       Activity Tolerance:   Fair     After treatment:   Patient left in no apparent distress in bed and placed in chair position., Call bell within reach, Bed/ chair alarm activated, Caregiver / family present, Side rails x3, Heels elevated for pressure relief, and Updated patient's board on functional status and mobility recommendations    COMMUNICATION/EDUCATION:   The patient's plan of care was discussed with: occupational therapist and registered nurse    Patient Education  Education Given To: Patient;Family  Education Provided: Role of Therapy;Plan of Care  Education Provided Comments: Positioning  Education Method: Verbal  Barriers to Learning: Cognition  Education Outcome: Continued education needed    Thank you for this referral.  Hipolito Hayes, KWASI  Minutes: 40      Physical Therapy Evaluation Charge Determination   History Examination Presentation Decision-Making   LOW Complexity : Zero comorbidities / personal factors that will impact the outcome / POC LOW Complexity : 1-2 Standardized tests and measures addressing body structure, function, activity limitation and / or participation in recreation  LOW Complexity : Stable, uncomplicated  AM-PAC  LOW    Based on the above components, the patient evaluation is determined to be of the following complexity level: Low

## 2024-08-13 NOTE — PLAN OF CARE
Problem: Discharge Planning  Goal: Discharge to home or other facility with appropriate resources  8/13/2024 0858 by Shikha Portillo RN  Outcome: Progressing  8/12/2024 2244 by Rosi Manuel RN  Outcome: Progressing     Problem: Safety - Adult  Goal: Free from fall injury  8/13/2024 0858 by Shikha Portillo RN  Outcome: Progressing  8/12/2024 2244 by Rosi Manuel RN  Outcome: Progressing     Problem: ABCDS Injury Assessment  Goal: Absence of physical injury  8/13/2024 0858 by Shikha Portillo RN  Outcome: Progressing  8/12/2024 2244 by Rosi Manuel RN  Outcome: Progressing     Problem: Chronic Conditions and Co-morbidities  Goal: Patient's chronic conditions and co-morbidity symptoms are monitored and maintained or improved  8/13/2024 0858 by Shikha Portillo RN  Outcome: Progressing  8/12/2024 2244 by Rosi Manuel RN  Outcome: Progressing     Problem: Pain  Goal: Verbalizes/displays adequate comfort level or baseline comfort level  8/13/2024 0858 by Shikha Portillo RN  Outcome: Progressing  8/12/2024 2244 by Rosi Manuel RN  Outcome: Progressing

## 2024-08-13 NOTE — CARE COORDINATION
08/13/24    Case management daily progress note    Received and reviewed handoff from previous CM.     Reason for Admission:     Chest pain [R07.9]  Acute chest pain [R07.9]  Abnormal LFTs [R79.89]  Idiopathic acute pancreatitis without infection or necrosis [K85.00]       RUR: 23%    Transition of care plan:  Plan to return to [facility] today, self pay. Per facility may only have semi-private room.   Patient/friend updated on acceptance with private pay and semi-private bed availability. Will plan to update when confirm bed is available today.     Awaiting confirmation from [facility] that bed is available today. Message sent via [employer] and  left with admission requesting call back.  Will need stretcher transport once bed confirmed.    2:41PM  CM contact [facility] spoke with office mgr. after leaving message for admissions. She advises no one currently in admissions. She will reach out to staff and request they contact CM to discuss patient discharging back to facility today.    3:11pm  [facility] spoke with Vanessa in admissions.  Patient to return to [facility]   Patient scheduled for  [facility] today at 5pm.  [facility] room 522A. Nurse report, phone [phone]    3:29pm  [facility] spoke with Romi to advise that patient is scheduled to return to [facility] today at 5pm. Romi verbalizes understanding and states she will call CM back that she needs to check on something first and ends call.    3:33pm  Romi returns CM call and states if patient can stay until tomorrow he can go to [facility] for IPR. She gives contact at [facility] of Farzana.    CM contacted Farzana at [facility] and confirmed that she has spoke to Romi but that they have not received a referral and are not currently considering Mr. Clemens for admission to [facility].     This CM and supervisor (Christal) contacted Romi via phone and advised her of above. She verbalized understanding that patient is ready for discharge today and would

## 2024-08-13 NOTE — PLAN OF CARE
much help from another person does the patient currently need... Total; A Lot A Little None   1.  Putting on and taking off regular lower body clothing? [x]  1 []  2 []  3 []  4   2.  Bathing (including washing, rinsing, drying)? []  1 [x]  2 []  3 []  4   3.  Toileting, which includes using toilet, bedpan or urinal? [x] 1 []  2 []  3 []  4   4.  Putting on and taking off regular upper body clothing? []  1 [x]  2 []  3 []  4   5.  Taking care of personal grooming such as brushing teeth? []  1 []  2 [x]  3 []  4   6.  Eating meals? []  1 []  2 [x]  3 []  4   © 2007, Trustees of [facility], under license to [employer]. All rights reserved     Score: 12/24     Interpretation of Tool:  Represents clinically-significant functional categories (i.e. Activities of daily living).    Cutoff score 39.4 (19) correlates to a good likelihood of discharging home versus a facility  Shelby Corrales, Luz Elena Eubanks, Dale Naqvi, Phyllis Brown, Todd Gonzales, Iglesia Corrales, AM-PAC “6-Clicks” Functional Assessment Scores Predict Acute Care Hospital Discharge Destination, Physical Therapy, Volume 94, Issue 9, 1 September 2014, Pages 3752-6312, [email or web address]    Pain Rating:  Pt reporting hip and knee pain   Pain Intervention(s):   repositioning    Activity Tolerance:   Fair     After treatment:   Patient left in no apparent distress in bed and placed in chair position., Call bell within reach, Bed/ chair alarm activated, Caregiver / family present, Side rails x3, and Heels elevated for pressure relief    COMMUNICATION/EDUCATION:   The patient's plan of care was discussed with: physical therapist and registered nurse    Patient Education  Education Given To: Patient;Family  Education Provided: Role of Therapy;Plan of Care;Transfer Training  Education Method: Verbal  Barriers to Learning: Cognition  Education Outcome: Verbalized understanding;Continued education needed    Thank you for this  referral.  Norma Alvarenga, OT  Minutes: 43

## 2024-08-13 NOTE — DISCHARGE INSTRUCTIONS
HOSPITALIST DISCHARGE INSTRUCTIONS  NAME:  Taj Clemens   :  1960   MRN:  [id number]     Date/Time:  2024 12:18 PM    ADMIT DATE: 2024     DISCHARGE DATE: 2024     DISCHARGE DIAGNOSIS:  Abdominal pain   Elevated AST/ALT => 2/2 statin. Statin dose decreased     DISCHARGE INSTRUCTIONS:  Thank you for allowing us to participate in your care. Your discharging Hospitalist is Judie Mims MD. You were admitted for evaluation and treatment of the above. ***      MEDICATIONS:  1) PLEASE REPEAT LFT's IN 4-6 WEEKS TO ENSURE STABILITY   2) PLEASE REPEAT TFT's IN 4-6 WEEKS TO ENSURE STABILITY     It is important that you take the medication exactly as they are prescribed.   Keep your medication in the bottles provided by the pharmacist and keep a list of the medication names, dosages, and times to be taken in your wallet.   Do not take other medications without consulting your doctor.             If you experience any of the following symptoms then please call your primary care physician or return to the emergency room if you cannot get hold of your doctor:  Fever, chills, nausea, vomiting, diarrhea, change in mentation, falling, bleeding, shortness of breath    Follow Up:  Please call the below provider to arrange hospital follow up appointment with PCP     For questions regarding your Hospitalization or to contact the Hospital Medicine team, please call [phone].      Information obtained by :  I understand that if any problems occur once I am at home I am to contact my physician.    I understand and acknowledge receipt of the instructions indicated above.                                                                                                                                           Physician's or R.N.'s Signature                                                                  Date/Time                                                                                                                                               Patient or Representative Signature                                                          Date/Time

## 2024-08-13 NOTE — PROGRESS NOTES
Pt was notified of intention to reposition pt and place lidocaine pt. While attempting to place lidocaine patches on pt and reposition pt, pt started aggressively swinging at the PCT and this RN.   Explained to pt and person at bedside this behavior will not be tolerated. Charge notified of situation.

## 2024-08-13 NOTE — PROGRESS NOTES
Spiritual Health Assessment/Progress Note  [facility]    Initial Encounter,  ,  ,      Name: Taj Clemens MRN: [id number]    Age: 64 y.o.     Sex: male   Language: English   Baptist: Jain   Chest pain     Date: 8/13/2024            Total Time Calculated: 34 min              Spiritual Assessment began in [facility] B3 INTERMEDIATE CARE UNIT            Encounter Overview/Reason: Initial Encounter  Service Provided For: Patient, Significant other    Eliza, Belief, Meaning:   Patient identifies as spiritual and is connected with a eliza tradition or spiritual practice  Family/Friends identify as spiritual and are connected with a eliza tradition or spiritual practice      Importance and Influence:  Patient has spiritual/personal beliefs that influence decisions regarding their health  Family/Friends have spiritual/personal beliefs that influence decisions regarding the patient's health    Community:  Patient is connected with a spiritual community and feels well-supported. Support system includes: Spouse/Partner  Family/Friends are connected with a spiritual community: and feel well-supported. Support system includes: Spouse/Partner and Extended family    Assessment and Plan of Care:     Patient Interventions include: Facilitated expression of thoughts and feelings, Explored spiritual coping/struggle/distress and theological reflection, and Affirmed coping skills/support systems  Family/Friends Interventions include: Family/Friends Interventions, Explored spiritual coping/struggle/distress and theological reflection, Affirmed coping skills/support systems, and Engaged in life review and/or legacy    Patient Plan of Care: Spiritual Care available upon further referral  Family/Friends Plan of Care: Spiritual Care available upon further referral    Narrative:  Spiritual care assessment:  Reviewed chart notes, met Pt bedside and girl friend also present. Pt seemed sad as he tried to communicate how he is

## 2024-08-14 NOTE — PROGRESS NOTES
Physician Progress Note      PATIENT:               JOSEFINA VIRGEN  [facility] #:                  [id number]  :                       1960  ADMIT DATE:       2024 12:52 AM  DISCH DATE:        2024 5:42 PM  RESPONDING  PROVIDER #:        Judie Mims MD          QUERY TEXT:    Good afternoon.    Patient admitted with pancreatitis, noted to have atrial flutter and is   maintained on Eliquis.    If possible, please document in progress notes and discharge summary if you   are evaluating and/or treating any of the following:?  ?  The medical record reflects the following:    Risk Factors: 64 yr old male, HFrEF, HTN, hx of CVA    Clinical Indicators: from  IM N: \"Aflutter Chronic systolic CHF HTN\"    Treatment: Eliquis      Thank you,  Christal Banerjee RN, CDI  Options provided:  -- Secondary hypercoagulable state in a patient with atrial fibrillation  -- Other - I will add my own diagnosis  -- Disagree - Not applicable / Not valid  -- Disagree - Clinically unable to determine / Unknown  -- Refer to Clinical Documentation Reviewer    PROVIDER RESPONSE TEXT:    This patient has secondary hypercoagulable state in a patient with atrial   fibrillation.    Query created by: Christal Banerjee on 2024 4:59 PM      Electronically signed by:  Judie Mims MD 2024 8:47 PM

## 2024-08-28 NOTE — PROGRESS NOTES
Physician Progress Note      PATIENT:               JOSEFINA VIRGEN  [facility] #:                  [id number]  :                       1960  ADMIT DATE:       2024 12:52 AM  DISCH DATE:        2024 5:42 PM  RESPONDING  PROVIDER #:        Judie Mims MD          QUERY TEXT:    Good morning.    Patient presented with chest pain on . Noted documentation of acute   pancreatitis in  H&P and  IM Progress Note.  Lipase was 116. CT of   abdomen/pelvis with no acute process. Abdominal ultrasound showed mild hepatic   steatosis.    In order to support the diagnosis of acute pancreatitis, please include   additional clinical indicators in your documentation.  Or please document if   the diagnosis of acute pancreatitis has been ruled out after further study.    The medical record reflects the following:    Risk Factors: 64 yr old male, hypothyroidism, atrial flutter, chronic systolic   CHF, HTN, BPH, depression, hx of CVA    Clinical Indicators: 24 01:15 Lipase: 116; (H): Data is abnormally   high CT abd/pelvis: No acute process. AAA (30 mm);  Abd U/S: Mild   hepatic steatosis; from  H&P and  IM PN: \"Acute pancreatitis\"; from,    DC Summary: \"Idiopathic acute pancreatitis without infection or   necrosis\" and \"Acute pancreatitis Elevated LFTs -diagnosed by admitting MD.   Lipase only slight elevated and no radiographic e/o pancreatitis. CT ab/pelvis   with a triple AA measuring 30mm but no e/o acute process. Clinically improved   with conservative measures. TG WNL. US with steatosis but no e/o gallstones   or acute process\"    Treatment: Labs, CT abd/pelvis, Abd U/S, IV fluids, pain control      Thank you,  Christal Banerjee RN, CDI  Options provided:  -- Idiopathic acute pancreatitis without infection or necrosis present as   evidenced by, Please document evidence.  -- Idiopathic acute pancreatitis without infection or necrosis was ruled out  -- Other - I will add

## 2024-09-26 ENCOUNTER — HOSPITAL ENCOUNTER (OUTPATIENT)
Facility: HOSPITAL | Age: 64
Discharge: HOME | End: 2024-09-29
Attending: ORTHOPAEDIC SURGERY
Payer: MEDICARE

## 2024-09-26 DIAGNOSIS — M54.16 LUMBAR RADICULOPATHY: ICD-10-CM

## 2024-09-26 DIAGNOSIS — M25.552 LEFT HIP PAIN: ICD-10-CM

## 2024-09-26 DIAGNOSIS — M51.36 DEGENERATIVE LUMBAR DISC: ICD-10-CM

## 2024-09-26 DIAGNOSIS — M51.369 DEGENERATIVE LUMBAR DISC: ICD-10-CM

## 2024-09-26 PROCEDURE — 78306 BONE IMAGING WHOLE BODY: CPT | Performed by: ORTHOPAEDIC SURGERY

## 2024-09-26 PROCEDURE — 72148 MRI LUMBAR SPINE W/O DYE: CPT

## 2024-09-26 PROCEDURE — A9503 TC99M MEDRONATE: HCPCS | Performed by: ORTHOPAEDIC SURGERY

## 2024-09-26 PROCEDURE — 3430000000 HC RX DIAGNOSTIC RADIOPHARMACEUTICAL: Performed by: ORTHOPAEDIC SURGERY

## 2024-09-26 RX ORDER — TC 99M MEDRONATE 20 MG/10ML
25 INJECTION, POWDER, LYOPHILIZED, FOR SOLUTION INTRAVENOUS ONCE
Status: COMPLETED | OUTPATIENT
Start: 2024-09-26 | End: 2024-09-26

## 2024-09-26 RX ADMIN — TC 99M MEDRONATE 25 MILLICURIE: 20 INJECTION, POWDER, LYOPHILIZED, FOR SOLUTION INTRAVENOUS at 10:29

## 2024-10-09 ENCOUNTER — HOSPITAL ENCOUNTER (EMERGENCY)
Facility: HOSPITAL | Age: 64
Discharge: HOME | End: 2024-10-09
Attending: EMERGENCY MEDICINE
Payer: MEDICARE

## 2024-10-09 ENCOUNTER — APPOINTMENT (OUTPATIENT)
Facility: HOSPITAL | Age: 64
End: 2024-10-09
Payer: MEDICARE

## 2024-10-09 VITALS
RESPIRATION RATE: 12 BRPM | HEART RATE: 66 BPM | OXYGEN SATURATION: 95 % | TEMPERATURE: 98 F | SYSTOLIC BLOOD PRESSURE: 126 MMHG | BODY MASS INDEX: 33.49 KG/M2 | DIASTOLIC BLOOD PRESSURE: 83 MMHG | WEIGHT: 246.91 LBS

## 2024-10-09 DIAGNOSIS — R10.9 ABDOMINAL PAIN, UNSPECIFIED ABDOMINAL LOCATION: Primary | ICD-10-CM

## 2024-10-09 DIAGNOSIS — R10.9 FLANK PAIN: ICD-10-CM

## 2024-10-09 LAB
ALBUMIN SERPL-MCNC: 3.8 G/DL (ref 3.5–5)
ALBUMIN/GLOB SERPL: 0.8 (ref 1.1–2.2)
ALP SERPL-CCNC: 81 U/L (ref 45–117)
ALT SERPL-CCNC: 56 U/L (ref 12–78)
ANION GAP SERPL CALC-SCNC: 1 MMOL/L (ref 2–12)
APPEARANCE UR: CLEAR
AST SERPL-CCNC: 31 U/L (ref 15–37)
BACTERIA URNS QL MICRO: NEGATIVE /HPF
BASOPHILS # BLD: 0 K/UL (ref 0–0.1)
BASOPHILS NFR BLD: 1 % (ref 0–1)
BILIRUB SERPL-MCNC: 0.8 MG/DL (ref 0.2–1)
BILIRUB UR QL: NEGATIVE
BUN SERPL-MCNC: 16 MG/DL (ref 6–20)
BUN/CREAT SERPL: 15 (ref 12–20)
CALCIUM SERPL-MCNC: 9.3 MG/DL (ref 8.5–10.1)
CAOX CRY URNS QL MICRO: ABNORMAL
CHLORIDE SERPL-SCNC: 108 MMOL/L (ref 97–108)
CO2 SERPL-SCNC: 26 MMOL/L (ref 21–32)
COLOR UR: ABNORMAL
CREAT SERPL-MCNC: 1.09 MG/DL (ref 0.7–1.3)
DIFFERENTIAL METHOD BLD: ABNORMAL
EOSINOPHIL # BLD: 0.3 K/UL (ref 0–0.4)
EOSINOPHIL NFR BLD: 5 % (ref 0–7)
EPITH CASTS URNS QL MICRO: ABNORMAL /LPF
ERYTHROCYTE [DISTWIDTH] IN BLOOD BY AUTOMATED COUNT: 15.1 % (ref 11.5–14.5)
GLOBULIN SER CALC-MCNC: 4.5 G/DL (ref 2–4)
GLUCOSE SERPL-MCNC: 94 MG/DL (ref 65–100)
GLUCOSE UR STRIP.AUTO-MCNC: NEGATIVE MG/DL
HCT VFR BLD AUTO: 40 % (ref 36.6–50.3)
HGB BLD-MCNC: 13.3 G/DL (ref 12.1–17)
HGB UR QL STRIP: NEGATIVE
IMM GRANULOCYTES # BLD AUTO: 0 K/UL (ref 0–0.04)
IMM GRANULOCYTES NFR BLD AUTO: 0 % (ref 0–0.5)
KETONES UR QL STRIP.AUTO: NEGATIVE MG/DL
LEUKOCYTE ESTERASE UR QL STRIP.AUTO: ABNORMAL
LIPASE SERPL-CCNC: 66 U/L (ref 13–75)
LYMPHOCYTES # BLD: 2.2 K/UL (ref 0.8–3.5)
LYMPHOCYTES NFR BLD: 36 % (ref 12–49)
MCH RBC QN AUTO: 31.1 PG (ref 26–34)
MCHC RBC AUTO-ENTMCNC: 33.3 G/DL (ref 30–36.5)
MCV RBC AUTO: 93.7 FL (ref 80–99)
MONOCYTES # BLD: 0.6 K/UL (ref 0–1)
MONOCYTES NFR BLD: 9 % (ref 5–13)
NEUTS SEG # BLD: 3 K/UL (ref 1.8–8)
NEUTS SEG NFR BLD: 49 % (ref 32–75)
NITRITE UR QL STRIP.AUTO: NEGATIVE
NRBC # BLD: 0 K/UL (ref 0–0.01)
NRBC BLD-RTO: 0 PER 100 WBC
PH UR STRIP: 6.5 (ref 5–8)
PLATELET # BLD AUTO: 269 K/UL (ref 150–400)
PMV BLD AUTO: 10.6 FL (ref 8.9–12.9)
POTASSIUM SERPL-SCNC: 4.3 MMOL/L (ref 3.5–5.1)
PROT SERPL-MCNC: 8.3 G/DL (ref 6.4–8.2)
PROT UR STRIP-MCNC: NEGATIVE MG/DL
RBC # BLD AUTO: 4.27 M/UL (ref 4.1–5.7)
RBC #/AREA URNS HPF: ABNORMAL /HPF (ref 0–5)
SODIUM SERPL-SCNC: 135 MMOL/L (ref 136–145)
SP GR UR REFRACTOMETRY: 1.02 (ref 1–1.03)
SPECIMEN HOLD: NORMAL
UROBILINOGEN UR QL STRIP.AUTO: 1 EU/DL (ref 0.2–1)
WBC # BLD AUTO: 6 K/UL (ref 4.1–11.1)
WBC URNS QL MICRO: ABNORMAL /HPF (ref 0–4)

## 2024-10-09 PROCEDURE — 99284 EMERGENCY DEPT VISIT MOD MDM: CPT

## 2024-10-09 PROCEDURE — 85025 COMPLETE CBC W/AUTO DIFF WBC: CPT

## 2024-10-09 PROCEDURE — 71045 X-RAY EXAM CHEST 1 VIEW: CPT

## 2024-10-09 PROCEDURE — 6360000002 HC RX W HCPCS: Performed by: EMERGENCY MEDICINE

## 2024-10-09 PROCEDURE — 83690 ASSAY OF LIPASE: CPT

## 2024-10-09 PROCEDURE — 80053 COMPREHEN METABOLIC PANEL: CPT

## 2024-10-09 PROCEDURE — 96372 THER/PROPH/DIAG INJ SC/IM: CPT

## 2024-10-09 PROCEDURE — 81001 URINALYSIS AUTO W/SCOPE: CPT

## 2024-10-09 PROCEDURE — 36415 COLL VENOUS BLD VENIPUNCTURE: CPT

## 2024-10-09 RX ORDER — KETOROLAC TROMETHAMINE 30 MG/ML
30 INJECTION, SOLUTION INTRAMUSCULAR; INTRAVENOUS
Status: COMPLETED | OUTPATIENT
Start: 2024-10-09 | End: 2024-10-09

## 2024-10-09 RX ORDER — KETOROLAC TROMETHAMINE 15 MG/ML
15 INJECTION, SOLUTION INTRAMUSCULAR; INTRAVENOUS ONCE
Status: DISCONTINUED | OUTPATIENT
Start: 2024-10-09 | End: 2024-10-09

## 2024-10-09 RX ADMIN — KETOROLAC TROMETHAMINE 30 MG: 30 INJECTION, SOLUTION INTRAMUSCULAR at 22:09

## 2024-10-09 ASSESSMENT — ENCOUNTER SYMPTOMS
SORE THROAT: 0
DIARRHEA: 0
COLOR CHANGE: 0
SHORTNESS OF BREATH: 0
COUGH: 0
NAUSEA: 0
VOMITING: 0
RHINORRHEA: 0
BACK PAIN: 0
EYE PAIN: 0
ABDOMINAL PAIN: 1

## 2024-10-09 ASSESSMENT — PAIN - FUNCTIONAL ASSESSMENT: PAIN_FUNCTIONAL_ASSESSMENT: ADULT NONVERBAL PAIN SCALE (NPVS)

## 2024-10-09 NOTE — ED TRIAGE NOTES
Patient to ED by EMS from [facility] for left and right side pain.     Hx stroke 6 months ago with R sided deficits.    Per facility patient is nonverbal, today patient is oriented, able to localize pain. Expressing familiarity with staff members.     Caregiver at bedside. Caregiver reporting patients pain is worse in abdomen and R side. Patient has had recent MRIs to find source of the pain.

## 2024-10-10 NOTE — ED PROVIDER NOTES
[facility] EMERGENCY DEPT  EMERGENCY DEPARTMENT ENCOUNTER      Pt Name: Taj Clemens  MRN: [id number]  Birthdate 1960  Date of evaluation: 10/9/2024  Provider: Pb Moralez MD    CHIEF COMPLAINT       Chief Complaint   Patient presents with    Pain         HISTORY OF PRESENT ILLNESS   (Location/Symptom, Timing/Onset, Context/Setting, Quality, Duration, Modifying Factors, Severity)  Note limiting factors.   64-year-old male comes from a local rehab center where he has been rehabbing from a stroke 6 months ago presents today with some back and flank pain.  Is very difficult to expect for this patient to express his pain or his location or his progress.  Wife believes he has some pain that might be related to his previous sciatica problems.  He also complains of some penis pain that could be dysuria.  No fever or chills.  No nausea or vomiting.  Patient stable in the ER.    The history is provided by the patient.   Abdominal Pain  Pain location:  R flank  Pain quality: aching    Pain radiates to:  Does not radiate  Pain severity:  Mild  Onset quality:  Gradual  Timing:  Constant  Progression:  Waxing and waning  Chronicity:  New  Context: not alcohol use, not awakening from sleep, not diet changes, not eating, not medication withdrawal, not previous surgeries, not recent illness, not recent sexual activity, not retching and not sick contacts    Relieved by:  Nothing  Worsened by:  Nothing  Ineffective treatments:  None tried  Associated symptoms: no chest pain, no cough, no diarrhea, no dysuria, no fatigue, no fever, no nausea, no shortness of breath, no sore throat and no vomiting          Review of External Medical Records:     Nursing Notes were reviewed.    REVIEW OF SYSTEMS    (2-9 systems for level 4, 10 or more for level 5)     Review of Systems   Constitutional:  Negative for fatigue and fever.   HENT:  Negative for ear pain, rhinorrhea and sore throat.    Eyes:  Negative for pain and visual

## 2024-10-10 NOTE — ED NOTES
Discharge instructions reviewed with patient and caregiver. Opportunity to answer questions and provide clarification given.     Ambulance ride back to [facility] being established

## 2024-10-18 ENCOUNTER — TELEPHONE (OUTPATIENT)
Age: 64
End: 2024-10-18

## 2024-10-18 NOTE — TELEPHONE ENCOUNTER
Patient's wife called with a questions for Doctor Elis     Not happy with foods that chana is being fed he is not progresses as fast as therapy thinks he should the facility he is staying with is feeding him all the wrong foods     Prebotic   Post botic   Probiotic     In order to heal     And wife just wants advice and medication.      Romi Ovalles (Wife)  [phone] (Home Phone)

## 2024-10-24 NOTE — TELEPHONE ENCOUNTER
Verified patient with two types of identifiers. Mr. Clemens's wife want to know if he could take a probiotic for his gut health. Advised Ms. Ovalles there is no cardiac contraindication. She was appreciative.

## 2024-12-19 ENCOUNTER — APPOINTMENT (OUTPATIENT)
Facility: HOSPITAL | Age: 64
End: 2024-12-19
Payer: MEDICARE

## 2024-12-19 ENCOUNTER — HOSPITAL ENCOUNTER (EMERGENCY)
Facility: HOSPITAL | Age: 64
Discharge: NURSING, REHAB OR LONG-TERM CARE | End: 2024-12-20
Attending: EMERGENCY MEDICINE
Payer: MEDICARE

## 2024-12-19 DIAGNOSIS — N30.00 ACUTE CYSTITIS WITHOUT HEMATURIA: ICD-10-CM

## 2024-12-19 DIAGNOSIS — R10.33 PERIUMBILICAL ABDOMINAL PAIN: Primary | ICD-10-CM

## 2024-12-19 LAB
ALBUMIN SERPL-MCNC: 3.9 G/DL (ref 3.5–5)
ALBUMIN/GLOB SERPL: 0.9 (ref 1.1–2.2)
ALP SERPL-CCNC: 82 U/L (ref 45–117)
ALT SERPL-CCNC: 43 U/L (ref 12–78)
ANION GAP SERPL CALC-SCNC: 4 MMOL/L (ref 2–12)
AST SERPL-CCNC: 16 U/L (ref 15–37)
BASOPHILS # BLD: 0 K/UL (ref 0–0.1)
BASOPHILS NFR BLD: 1 % (ref 0–1)
BILIRUB SERPL-MCNC: 0.7 MG/DL (ref 0.2–1)
BUN SERPL-MCNC: 13 MG/DL (ref 6–20)
BUN/CREAT SERPL: 13 (ref 12–20)
CALCIUM SERPL-MCNC: 9.7 MG/DL (ref 8.5–10.1)
CHLORIDE SERPL-SCNC: 106 MMOL/L (ref 97–108)
CO2 SERPL-SCNC: 29 MMOL/L (ref 21–32)
COMMENT:: NORMAL
CREAT SERPL-MCNC: 0.98 MG/DL (ref 0.7–1.3)
DIFFERENTIAL METHOD BLD: ABNORMAL
EOSINOPHIL # BLD: 0.2 K/UL (ref 0–0.4)
EOSINOPHIL NFR BLD: 3 % (ref 0–7)
ERYTHROCYTE [DISTWIDTH] IN BLOOD BY AUTOMATED COUNT: 15.3 % (ref 11.5–14.5)
FLUAV RNA SPEC QL NAA+PROBE: NOT DETECTED
FLUBV RNA SPEC QL NAA+PROBE: NOT DETECTED
GLOBULIN SER CALC-MCNC: 4.5 G/DL (ref 2–4)
GLUCOSE SERPL-MCNC: 88 MG/DL (ref 65–100)
HCT VFR BLD AUTO: 39.6 % (ref 36.6–50.3)
HGB BLD-MCNC: 13 G/DL (ref 12.1–17)
IMM GRANULOCYTES # BLD AUTO: 0 K/UL (ref 0–0.04)
IMM GRANULOCYTES NFR BLD AUTO: 0 % (ref 0–0.5)
LIPASE SERPL-CCNC: 75 U/L (ref 13–75)
LYMPHOCYTES # BLD: 2.2 K/UL (ref 0.8–3.5)
LYMPHOCYTES NFR BLD: 43 % (ref 12–49)
MCH RBC QN AUTO: 31.3 PG (ref 26–34)
MCHC RBC AUTO-ENTMCNC: 32.8 G/DL (ref 30–36.5)
MCV RBC AUTO: 95.2 FL (ref 80–99)
MONOCYTES # BLD: 0.4 K/UL (ref 0–1)
MONOCYTES NFR BLD: 7 % (ref 5–13)
NEUTS SEG # BLD: 2.4 K/UL (ref 1.8–8)
NEUTS SEG NFR BLD: 46 % (ref 32–75)
NRBC # BLD: 0 K/UL (ref 0–0.01)
NRBC BLD-RTO: 0 PER 100 WBC
PLATELET # BLD AUTO: 357 K/UL (ref 150–400)
PMV BLD AUTO: 9.3 FL (ref 8.9–12.9)
POTASSIUM SERPL-SCNC: 3.8 MMOL/L (ref 3.5–5.1)
PROT SERPL-MCNC: 8.4 G/DL (ref 6.4–8.2)
RBC # BLD AUTO: 4.16 M/UL (ref 4.1–5.7)
SARS-COV-2 RNA RESP QL NAA+PROBE: NOT DETECTED
SODIUM SERPL-SCNC: 139 MMOL/L (ref 136–145)
SOURCE: NORMAL
SPECIMEN HOLD: NORMAL
SPECIMEN HOLD: NORMAL
TROPONIN I SERPL HS-MCNC: 7 NG/L (ref 0–76)
WBC # BLD AUTO: 5.1 K/UL (ref 4.1–11.1)

## 2024-12-19 PROCEDURE — 85025 COMPLETE CBC W/AUTO DIFF WBC: CPT

## 2024-12-19 PROCEDURE — 93005 ELECTROCARDIOGRAM TRACING: CPT | Performed by: EMERGENCY MEDICINE

## 2024-12-19 PROCEDURE — 6370000000 HC RX 637 (ALT 250 FOR IP): Performed by: STUDENT IN AN ORGANIZED HEALTH CARE EDUCATION/TRAINING PROGRAM

## 2024-12-19 PROCEDURE — 87636 SARSCOV2 & INF A&B AMP PRB: CPT

## 2024-12-19 PROCEDURE — 6360000002 HC RX W HCPCS: Performed by: STUDENT IN AN ORGANIZED HEALTH CARE EDUCATION/TRAINING PROGRAM

## 2024-12-19 PROCEDURE — 6360000004 HC RX CONTRAST MEDICATION: Performed by: EMERGENCY MEDICINE

## 2024-12-19 PROCEDURE — 36415 COLL VENOUS BLD VENIPUNCTURE: CPT

## 2024-12-19 PROCEDURE — 80053 COMPREHEN METABOLIC PANEL: CPT

## 2024-12-19 PROCEDURE — 81001 URINALYSIS AUTO W/SCOPE: CPT

## 2024-12-19 PROCEDURE — 96374 THER/PROPH/DIAG INJ IV PUSH: CPT

## 2024-12-19 PROCEDURE — 99285 EMERGENCY DEPT VISIT HI MDM: CPT

## 2024-12-19 PROCEDURE — 83690 ASSAY OF LIPASE: CPT

## 2024-12-19 PROCEDURE — 74177 CT ABD & PELVIS W/CONTRAST: CPT

## 2024-12-19 PROCEDURE — 84484 ASSAY OF TROPONIN QUANT: CPT

## 2024-12-19 RX ORDER — DIAZEPAM 5 MG/1
5 TABLET ORAL ONCE
Status: COMPLETED | OUTPATIENT
Start: 2024-12-19 | End: 2024-12-19

## 2024-12-19 RX ORDER — IOPAMIDOL 755 MG/ML
100 INJECTION, SOLUTION INTRAVASCULAR
Status: COMPLETED | OUTPATIENT
Start: 2024-12-19 | End: 2024-12-19

## 2024-12-19 RX ORDER — KETOROLAC TROMETHAMINE 15 MG/ML
15 INJECTION, SOLUTION INTRAMUSCULAR; INTRAVENOUS ONCE
Status: COMPLETED | OUTPATIENT
Start: 2024-12-19 | End: 2024-12-19

## 2024-12-19 RX ADMIN — IOPAMIDOL 100 ML: 755 INJECTION, SOLUTION INTRAVENOUS at 22:15

## 2024-12-19 RX ADMIN — DIAZEPAM 5 MG: 5 TABLET ORAL at 21:24

## 2024-12-19 RX ADMIN — KETOROLAC TROMETHAMINE 15 MG: 15 INJECTION, SOLUTION INTRAMUSCULAR; INTRAVENOUS at 21:24

## 2024-12-19 ASSESSMENT — LIFESTYLE VARIABLES: HOW OFTEN DO YOU HAVE A DRINK CONTAINING ALCOHOL: NEVER

## 2024-12-19 ASSESSMENT — PAIN SCALES - GENERAL: PAINLEVEL_OUTOF10: 7

## 2024-12-19 ASSESSMENT — PAIN - FUNCTIONAL ASSESSMENT: PAIN_FUNCTIONAL_ASSESSMENT: 0-10

## 2024-12-20 VITALS
TEMPERATURE: 98.7 F | HEIGHT: 72 IN | RESPIRATION RATE: 12 BRPM | DIASTOLIC BLOOD PRESSURE: 85 MMHG | SYSTOLIC BLOOD PRESSURE: 129 MMHG | BODY MASS INDEX: 33.15 KG/M2 | WEIGHT: 244.71 LBS | OXYGEN SATURATION: 98 % | HEART RATE: 59 BPM

## 2024-12-20 LAB
APPEARANCE UR: CLEAR
BACTERIA URNS QL MICRO: ABNORMAL /HPF
BILIRUB UR QL: NEGATIVE
COLOR UR: ABNORMAL
EKG ATRIAL RATE: 64 BPM
EKG DIAGNOSIS: NORMAL
EKG P AXIS: 11 DEGREES
EKG P-R INTERVAL: 150 MS
EKG Q-T INTERVAL: 432 MS
EKG QRS DURATION: 92 MS
EKG QTC CALCULATION (BAZETT): 445 MS
EKG R AXIS: 0 DEGREES
EKG T AXIS: 36 DEGREES
EKG VENTRICULAR RATE: 64 BPM
EPITH CASTS URNS QL MICRO: ABNORMAL /LPF
GLUCOSE UR STRIP.AUTO-MCNC: NEGATIVE MG/DL
HGB UR QL STRIP: NEGATIVE
HYALINE CASTS URNS QL MICRO: ABNORMAL /LPF (ref 0–2)
KETONES UR QL STRIP.AUTO: 15 MG/DL
LEUKOCYTE ESTERASE UR QL STRIP.AUTO: ABNORMAL
NITRITE UR QL STRIP.AUTO: NEGATIVE
PH UR STRIP: 5 (ref 5–8)
PROT UR STRIP-MCNC: NEGATIVE MG/DL
RBC #/AREA URNS HPF: ABNORMAL /HPF (ref 0–5)
SP GR UR REFRACTOMETRY: 1.02 (ref 1–1.03)
UROBILINOGEN UR QL STRIP.AUTO: 1 EU/DL (ref 0.2–1)
WBC URNS QL MICRO: ABNORMAL /HPF (ref 0–4)

## 2024-12-20 PROCEDURE — 93010 ELECTROCARDIOGRAM REPORT: CPT | Performed by: SPECIALIST

## 2024-12-20 PROCEDURE — 6370000000 HC RX 637 (ALT 250 FOR IP): Performed by: EMERGENCY MEDICINE

## 2024-12-20 RX ORDER — CEPHALEXIN 500 MG/1
500 CAPSULE ORAL 3 TIMES DAILY
Qty: 21 CAPSULE | Refills: 0 | Status: SHIPPED | OUTPATIENT
Start: 2024-12-20 | End: 2024-12-20

## 2024-12-20 RX ORDER — CEPHALEXIN 500 MG/1
500 CAPSULE ORAL 3 TIMES DAILY
Qty: 21 CAPSULE | Refills: 0 | Status: SHIPPED | OUTPATIENT
Start: 2024-12-20 | End: 2024-12-27

## 2024-12-20 RX ADMIN — CEPHALEXIN 500 MG: 250 CAPSULE ORAL at 00:42

## 2024-12-20 NOTE — ED PROVIDER NOTES
Care assumed from Dr. John at 0000.  Please see her note for full H&P.  64-year-old male presents with couple weeks of abdominal pain.  CT abdomen pelvis negative.  UA pending at time of signout and returned showing concern for urinary tract infection.  Given dose of Keflex in the ED and Rx course of same.  Recommended PCP follow-up for further evaluation and return precautions were given for worsening or concerns.         Richard More, DO  12/20/24 0025    
ED SIGN OUT NOTE  Care assumed at [facility] 9:08 PM EST    Patient was signed out to me by Dr. Bell.     Patient is awaiting CT abd/pelvis and UA.    /85   Pulse 59   Temp 98.7 °F (37.1 °C) (Oral)   Resp 12   Ht 1.829 m (6')   Wt 111 kg (244 lb 11.4 oz)   SpO2 98%   BMI 33.19 kg/m²     Labs Reviewed   CBC WITH AUTO DIFFERENTIAL - Abnormal; Notable for the following components:       Result Value    RDW 15.3 (*)     All other components within normal limits   COMPREHENSIVE METABOLIC PANEL - Abnormal; Notable for the following components:    Total Protein 8.4 (*)     Globulin 4.5 (*)     Albumin/Globulin Ratio 0.9 (*)     All other components within normal limits   URINALYSIS WITH MICROSCOPIC - Abnormal; Notable for the following components:    Ketones, Urine 15 (*)     Leukocyte Esterase, Urine SMALL (*)     BACTERIA, URINE 4+ (*)     All other components within normal limits   URINE CULTURE HOLD SAMPLE   COVID-19 & INFLUENZA COMBO   TROPONIN   LIPASE   EXTRA TUBES HOLD     CT ABDOMEN PELVIS W IV CONTRAST Additional Contrast? None   Final Result   No bowel obstruction, ileus or perforation. No intra-abdominal   abscess      Electronically signed by Abner Carbone MD          ED Course as of 12/20/24 0026   Thu Dec 19, 2024   2120 Patient reportedly unwilling to undergo CT imaging of abdomen. On my assessment, patient pointing to L lower back and tells me he has pain in this area that is making it hard for him to lay flat for CT. He reports if pain is better controlled, he would be willing to undergo CT imaging. Review of chart demonstrates hx of chronic back pain, lumbar radiculopathy. Will administer toradol and valium, and re-attempt CT once pain is improved. [JM]   2238 CT is negative for acute process.  Still awaiting UA for final disposition. [JM]   Fri Dec 20, 2024   0000 Patient signed out to Dr. More pending UA.  [TAMARA]      ED Course User Index  [JM] Jane John MD 
interpretation in the absence of a cardiologist and may also be found below under Reassessment/ED Course.           RADIOLOGY:   Non-plain film images such as CT, Ultrasound and MRI are read by the radiologist. Plain radiographic images are visualized and preliminarily interpreted by the emergency physician with the below findings:    Interpretation per the Radiologist below, if available at the time of this note:    CT ABDOMEN PELVIS W IV CONTRAST Additional Contrast? None    (Results Pending)        LABS:  Labs Reviewed   CBC WITH AUTO DIFFERENTIAL - Abnormal; Notable for the following components:       Result Value    RDW 15.3 (*)     All other components within normal limits   COMPREHENSIVE METABOLIC PANEL - Abnormal; Notable for the following components:    Total Protein 8.4 (*)     Globulin 4.5 (*)     Albumin/Globulin Ratio 0.9 (*)     All other components within normal limits   COVID-19 & INFLUENZA COMBO   URINE CULTURE HOLD SAMPLE   TROPONIN   LIPASE   EXTRA TUBES HOLD   URINALYSIS WITH MICROSCOPIC       All other labs were within normal range or not returned as of this dictation.    EMERGENCY DEPARTMENT COURSE and DIFFERENTIAL DIAGNOSIS/MDM:   Vitals:    Vitals:    12/19/24 1925   BP: (!) 150/94   Pulse: 67   Resp: 16   Temp: 98.7 °F (37.1 °C)   TempSrc: Oral   SpO2: 99%   Weight: 111 kg (244 lb 11.4 oz)   Height: 1.829 m (6')           Medical Decision Making  Amount and/or Complexity of Data Reviewed  Labs: ordered.  Radiology: ordered.  ECG/medicine tests: ordered.    Risk  Prescription drug management.            REASSESSMENT      9:07 PM  Change of shift.  Care of patient signed over to Dr Jenkins.  Bedside handoff complete. Awaiting UA, Ct abd/pelvis. .      CRITICAL CARE TIME       CONSULTS:  None    PROCEDURES:  Unless otherwise noted below, none     Procedures        FINAL IMPRESSION      1. Periumbilical abdominal pain          DISPOSITION/PLAN   DISPOSITION        PATIENT REFERRED TO:  Ramila

## 2024-12-20 NOTE — ED TRIAGE NOTES
Patient arrives via EMS with complaints of abdominal and chest pain.  [facility] called EMS due to BP of 152/109.  Staff gave him 6-25mg Carvedilol at 1745.  Says he has been having abdominal pain for 2-3 weeks.    Patient difficult to understand due to prior stroke.

## 2024-12-21 ENCOUNTER — APPOINTMENT (OUTPATIENT)
Facility: HOSPITAL | Age: 64
End: 2024-12-21
Payer: MEDICARE

## 2024-12-21 ENCOUNTER — HOSPITAL ENCOUNTER (EMERGENCY)
Facility: HOSPITAL | Age: 64
Discharge: HOME | End: 2024-12-21
Attending: STUDENT IN AN ORGANIZED HEALTH CARE EDUCATION/TRAINING PROGRAM
Payer: MEDICARE

## 2024-12-21 VITALS
WEIGHT: 244 LBS | DIASTOLIC BLOOD PRESSURE: 84 MMHG | HEART RATE: 72 BPM | SYSTOLIC BLOOD PRESSURE: 139 MMHG | BODY MASS INDEX: 33.05 KG/M2 | HEIGHT: 72 IN | OXYGEN SATURATION: 98 % | RESPIRATION RATE: 12 BRPM | TEMPERATURE: 98.1 F

## 2024-12-21 DIAGNOSIS — R39.89 URINARY PROBLEM: ICD-10-CM

## 2024-12-21 DIAGNOSIS — R34 DECREASED URINE OUTPUT: Primary | ICD-10-CM

## 2024-12-21 LAB
ANION GAP SERPL CALC-SCNC: 7 MMOL/L (ref 2–12)
APPEARANCE UR: CLEAR
BACTERIA URNS QL MICRO: NEGATIVE /HPF
BASOPHILS # BLD: 0 K/UL (ref 0–0.1)
BASOPHILS NFR BLD: 0 % (ref 0–1)
BILIRUB UR QL: NEGATIVE
BUN SERPL-MCNC: 12 MG/DL (ref 6–20)
BUN/CREAT SERPL: 12 (ref 12–20)
CALCIUM SERPL-MCNC: 9.8 MG/DL (ref 8.5–10.1)
CHLORIDE SERPL-SCNC: 106 MMOL/L (ref 97–108)
CO2 SERPL-SCNC: 27 MMOL/L (ref 21–32)
COLOR UR: ABNORMAL
CREAT SERPL-MCNC: 1 MG/DL (ref 0.7–1.3)
DIFFERENTIAL METHOD BLD: ABNORMAL
EOSINOPHIL # BLD: 0.2 K/UL (ref 0–0.4)
EOSINOPHIL NFR BLD: 3 % (ref 0–7)
EPITH CASTS URNS QL MICRO: ABNORMAL /LPF
ERYTHROCYTE [DISTWIDTH] IN BLOOD BY AUTOMATED COUNT: 15.3 % (ref 11.5–14.5)
GLUCOSE SERPL-MCNC: 68 MG/DL (ref 65–100)
GLUCOSE UR STRIP.AUTO-MCNC: NEGATIVE MG/DL
HCT VFR BLD AUTO: 36.7 % (ref 36.6–50.3)
HGB BLD-MCNC: 12 G/DL (ref 12.1–17)
HGB UR QL STRIP: NEGATIVE
HYALINE CASTS URNS QL MICRO: ABNORMAL /LPF (ref 0–2)
IMM GRANULOCYTES # BLD AUTO: 0 K/UL (ref 0–0.04)
IMM GRANULOCYTES NFR BLD AUTO: 0 % (ref 0–0.5)
KETONES UR QL STRIP.AUTO: 40 MG/DL
LEUKOCYTE ESTERASE UR QL STRIP.AUTO: NEGATIVE
LYMPHOCYTES # BLD: 1.7 K/UL (ref 0.8–3.5)
LYMPHOCYTES NFR BLD: 35 % (ref 12–49)
MCH RBC QN AUTO: 31.2 PG (ref 26–34)
MCHC RBC AUTO-ENTMCNC: 32.7 G/DL (ref 30–36.5)
MCV RBC AUTO: 95.3 FL (ref 80–99)
MONOCYTES # BLD: 0.4 K/UL (ref 0–1)
MONOCYTES NFR BLD: 8 % (ref 5–13)
NEUTS SEG # BLD: 2.5 K/UL (ref 1.8–8)
NEUTS SEG NFR BLD: 54 % (ref 32–75)
NITRITE UR QL STRIP.AUTO: NEGATIVE
NRBC # BLD: 0 K/UL (ref 0–0.01)
NRBC BLD-RTO: 0 PER 100 WBC
PH UR STRIP: 5 (ref 5–8)
PLATELET # BLD AUTO: 334 K/UL (ref 150–400)
PMV BLD AUTO: 9.1 FL (ref 8.9–12.9)
POTASSIUM SERPL-SCNC: 3.5 MMOL/L (ref 3.5–5.1)
PROT UR STRIP-MCNC: NEGATIVE MG/DL
RBC # BLD AUTO: 3.85 M/UL (ref 4.1–5.7)
RBC #/AREA URNS HPF: ABNORMAL /HPF (ref 0–5)
SODIUM SERPL-SCNC: 140 MMOL/L (ref 136–145)
SP GR UR REFRACTOMETRY: 1.02 (ref 1–1.03)
URINE CULTURE IF INDICATED: ABNORMAL
UROBILINOGEN UR QL STRIP.AUTO: 1 EU/DL (ref 0.2–1)
WBC # BLD AUTO: 4.7 K/UL (ref 4.1–11.1)
WBC URNS QL MICRO: ABNORMAL /HPF (ref 0–4)

## 2024-12-21 PROCEDURE — 81001 URINALYSIS AUTO W/SCOPE: CPT

## 2024-12-21 PROCEDURE — 6360000002 HC RX W HCPCS: Performed by: EMERGENCY MEDICINE

## 2024-12-21 PROCEDURE — 6370000000 HC RX 637 (ALT 250 FOR IP): Performed by: STUDENT IN AN ORGANIZED HEALTH CARE EDUCATION/TRAINING PROGRAM

## 2024-12-21 PROCEDURE — 96374 THER/PROPH/DIAG INJ IV PUSH: CPT

## 2024-12-21 PROCEDURE — 80048 BASIC METABOLIC PNL TOTAL CA: CPT

## 2024-12-21 PROCEDURE — 36415 COLL VENOUS BLD VENIPUNCTURE: CPT

## 2024-12-21 PROCEDURE — 85025 COMPLETE CBC W/AUTO DIFF WBC: CPT

## 2024-12-21 PROCEDURE — 76870 US EXAM SCROTUM: CPT

## 2024-12-21 PROCEDURE — 96361 HYDRATE IV INFUSION ADD-ON: CPT

## 2024-12-21 PROCEDURE — 99284 EMERGENCY DEPT VISIT MOD MDM: CPT

## 2024-12-21 PROCEDURE — 2580000003 HC RX 258: Performed by: STUDENT IN AN ORGANIZED HEALTH CARE EDUCATION/TRAINING PROGRAM

## 2024-12-21 RX ORDER — ONDANSETRON 4 MG/1
4 TABLET, ORALLY DISINTEGRATING ORAL ONCE
Status: COMPLETED | OUTPATIENT
Start: 2024-12-21 | End: 2024-12-21

## 2024-12-21 RX ORDER — TRAMADOL HYDROCHLORIDE 50 MG/1
50 TABLET ORAL ONCE
Status: DISCONTINUED | OUTPATIENT
Start: 2024-12-21 | End: 2024-12-21

## 2024-12-21 RX ORDER — MORPHINE SULFATE 4 MG/ML
4 INJECTION, SOLUTION INTRAMUSCULAR; INTRAVENOUS
Status: COMPLETED | OUTPATIENT
Start: 2024-12-21 | End: 2024-12-21

## 2024-12-21 RX ORDER — 0.9 % SODIUM CHLORIDE 0.9 %
500 INTRAVENOUS SOLUTION INTRAVENOUS ONCE
Status: COMPLETED | OUTPATIENT
Start: 2024-12-21 | End: 2024-12-21

## 2024-12-21 RX ORDER — MORPHINE SULFATE 15 MG/1
15 TABLET ORAL
Status: COMPLETED | OUTPATIENT
Start: 2024-12-21 | End: 2024-12-21

## 2024-12-21 RX ADMIN — MORPHINE SULFATE 4 MG: 4 INJECTION INTRAVENOUS at 18:34

## 2024-12-21 RX ADMIN — SODIUM CHLORIDE 500 ML: 9 INJECTION, SOLUTION INTRAVENOUS at 17:06

## 2024-12-21 RX ADMIN — ONDANSETRON 4 MG: 4 TABLET, ORALLY DISINTEGRATING ORAL at 23:22

## 2024-12-21 RX ADMIN — MORPHINE SULFATE 15 MG: 15 TABLET ORAL at 23:46

## 2024-12-21 ASSESSMENT — PAIN DESCRIPTION - DESCRIPTORS: DESCRIPTORS: ACHING

## 2024-12-21 ASSESSMENT — PAIN SCALES - GENERAL
PAINLEVEL_OUTOF10: 6
PAINLEVEL_OUTOF10: 10

## 2024-12-21 ASSESSMENT — PAIN DESCRIPTION - LOCATION
LOCATION: HIP
LOCATION: GROIN

## 2024-12-21 ASSESSMENT — PAIN DESCRIPTION - ORIENTATION: ORIENTATION: LEFT

## 2024-12-21 NOTE — ED TRIAGE NOTES
Patient having dysuria and feeling if urinary retention.   [facility] hemiplegia, stroke in March 2024, UTI and aphasia   Coming from [facility]

## 2024-12-22 NOTE — DISCHARGE INSTRUCTIONS
Return with any new or worsening symptoms.  I provided you with information to follow-up with urology as needed

## 2024-12-22 NOTE — ED PROVIDER NOTES
Patient presenting due to concerns for decreased urine output.  Care signed out to me by Dr. Gonzales pending remainder of labs urinalysis and ultrasound.  Please see previous notes for details.    Patient has grossly reassuring labs with a normal creatinine.  He has hydroceles on his ultrasound which she has had in the past but no other acute findings.  No infection on urinalysis.  Patient able to void on his own without difficulty after a bag of fluids.  Discussed increased fluid intake with the patient and his wife.  He was discharged in stable condition     Surinder Stone MD  12/21/24 7739    
[age]   Resp: 16 11 11   Temp: 98.7 °F (37.1 °C)     TempSrc: Oral     SpO2: 98% 98% 99%           Medical Decision Making  Amount and/or Complexity of Data Reviewed  Labs: ordered.  Radiology: ordered.    Risk  Prescription drug management.        CONSULTS:  None    PROCEDURES:  Unless otherwise noted below, none     Procedures    REASSESSMENT       **The patient has been re-evaluated and feeling much better and are stable for discharge**.  All available radiology and laboratory results have been reviewed with patient and/or available family.  Patient and/or family verbally conveyed their understanding and agreement of the patient's signs, symptoms, diagnosis, treatment and prognosis and additionally agree to follow-up as recommended in the discharge instructions or to return to the Emergency Department should their condition change or worsen prior to their follow-up appointment.  All questions have been answered and patient and/or available family express understanding.          FINAL IMPRESSION      1. Decreased urine output          DISPOSITION/PLAN   DISPOSITION        PATIENT REFERRED TO:  No follow-up provider specified.    DISCHARGE MEDICATIONS:  New Prescriptions    No medications on file         (Please note that portions of this note were completed with a voice recognition program.  Efforts were made to edit the dictations but occasionally words are mis-transcribed.)    oMna Gonzales DO (electronically signed)  Emergency Medicine Attending Physician

## 2025-01-09 ENCOUNTER — OFFICE VISIT (OUTPATIENT)
Age: 65
End: 2025-01-09
Payer: MEDICARE

## 2025-01-09 VITALS
BODY MASS INDEX: 31.15 KG/M2 | DIASTOLIC BLOOD PRESSURE: 60 MMHG | RESPIRATION RATE: 16 BRPM | SYSTOLIC BLOOD PRESSURE: 100 MMHG | WEIGHT: 230 LBS | HEART RATE: 58 BPM | HEIGHT: 72 IN | OXYGEN SATURATION: 96 %

## 2025-01-09 DIAGNOSIS — I10 ESSENTIAL HYPERTENSION: ICD-10-CM

## 2025-01-09 DIAGNOSIS — I50.22 CHRONIC SYSTOLIC CONGESTIVE HEART FAILURE (HCC): ICD-10-CM

## 2025-01-09 DIAGNOSIS — I48.3 TYPICAL ATRIAL FLUTTER (HCC): Primary | ICD-10-CM

## 2025-01-09 DIAGNOSIS — I63.412 ACUTE STROKE DUE TO EMBOLISM OF LEFT MIDDLE CEREBRAL ARTERY (HCC): ICD-10-CM

## 2025-01-09 DIAGNOSIS — R94.30 CARDIAC LV EJECTION FRACTION 21-30%: ICD-10-CM

## 2025-01-09 DIAGNOSIS — I67.9 CEREBRAL VASCULAR DISEASE: ICD-10-CM

## 2025-01-09 PROCEDURE — 99214 OFFICE O/P EST MOD 30 MIN: CPT | Performed by: SPECIALIST

## 2025-01-09 RX ORDER — AMIODARONE HYDROCHLORIDE 200 MG/1
200 TABLET ORAL
Qty: 36 TABLET | Refills: 3
Start: 2025-01-10

## 2025-01-09 RX ORDER — CARVEDILOL 3.12 MG/1
12.5 TABLET ORAL 2 TIMES DAILY
COMMUNITY
Start: 2024-10-05

## 2025-01-09 RX ORDER — SACUBITRIL AND VALSARTAN 49; 51 MG/1; MG/1
1 TABLET, FILM COATED ORAL DAILY
COMMUNITY

## 2025-01-09 ASSESSMENT — PATIENT HEALTH QUESTIONNAIRE - PHQ9
SUM OF ALL RESPONSES TO PHQ QUESTIONS 1-9: 0
SUM OF ALL RESPONSES TO PHQ QUESTIONS 1-9: 0
SUM OF ALL RESPONSES TO PHQ9 QUESTIONS 1 & 2: 0
SUM OF ALL RESPONSES TO PHQ QUESTIONS 1-9: 0
2. FEELING DOWN, DEPRESSED OR HOPELESS: NOT AT ALL
SUM OF ALL RESPONSES TO PHQ QUESTIONS 1-9: 0
1. LITTLE INTEREST OR PLEASURE IN DOING THINGS: NOT AT ALL

## 2025-01-09 NOTE — PATIENT INSTRUCTIONS
Reduce Amiodarone to 200mg three times a week.    Labs in 2 months.    We will see you back in 6 months.

## 2025-01-09 NOTE — PROGRESS NOTES
tablet by mouth daily, Disp: 30 tablet, Rfl: 0    diclofenac sodium (VOLTAREN) 1 % GEL, Apply 2 g topically 4 times daily as needed for Pain, Disp: 350 g, Rfl: 0    lidocaine 4 % external patch, Place 2 patches onto the skin daily PLEASE APPLY TO LOCATION AS PER PATIENT, Disp: 60 patch, Rfl: 0    gabapentin (NEURONTIN) 400 MG capsule, Take 1 capsule by mouth 3 times daily for 30 days. Max Daily Amount: 1,200 mg, Disp: 90 capsule, Rfl: 0    fluticasone (FLONASE) 50 MCG/ACT nasal spray, 1 spray by Each Nostril route daily, Disp: , Rfl:     vitamin D (ERGOCALCIFEROL) 1.25 MG (52770 UT) CAPS capsule, Take 1 capsule by mouth once a week, Disp: , Rfl:     Cyanocobalamin (VITAMIN B 12 PO), Take 1,000 mcg by mouth, Disp: , Rfl:     ascorbic acid (VITAMIN C) 500 MG tablet, Take 1 tablet by mouth 2 times daily, Disp: , Rfl:     baclofen (LIORESAL) 10 MG tablet, Take 1 tablet by mouth 3 times daily, Disp: , Rfl:     polyethylene glycol (GLYCOLAX) 17 GM/SCOOP powder, Take 17 g by mouth 2 times daily as needed (constipation), Disp: , Rfl:     mirtazapine (REMERON) 15 MG tablet, Take 1 tablet by mouth nightly, Disp: , Rfl:     senna-docusate (PERICOLACE) 8.6-50 MG per tablet, Take 1 tablet by mouth at bedtime, Disp: , Rfl:     apixaban (ELIQUIS) 5 MG TABS tablet, Take by mouth 2 times daily, Disp: , Rfl:     melatonin 3 MG TABS tablet, Take 2 tablets by mouth at bedtime, Disp: , Rfl:     sacubitril-valsartan (ENTRESTO) 24-26 MG per tablet, Take 1 tablet by mouth 2 times daily, Disp: , Rfl:     tamsulosin (FLOMAX) 0.4 MG capsule, Take 1 capsule by mouth nightly, Disp: , Rfl:     finasteride (PROSCAR) 5 MG tablet, Take 1 tablet by mouth daily, Disp: , Rfl:     aspirin 81 MG chewable tablet, Take 1 tablet by mouth daily, Disp: , Rfl:     vitamin D 25 MCG (1000 UT) CAPS, Take by mouth daily, Disp: , Rfl:    Impression see above.

## 2025-01-15 NOTE — PROGRESS NOTES
NEG   Final    Leukocyte Esterase, Urine 12/19/2024 SMALL (A)  NEG   Final    WBC, UA 12/19/2024 5-10  0 - 4 /hpf Final    RBC, UA 12/19/2024 0-5  0 - 5 /hpf Final    Epithelial Cells, UA 12/19/2024 FEW  FEW /lpf Final    Epithelial cell category consists of squamous cells and /or transitional urothelial cells. Renal tubular cells, if present, are separately identified as such.    BACTERIA, URINE 12/19/2024 4+ (A)  NEG /hpf Final    Hyaline Casts, UA 12/19/2024 0-2  0 - 2 /lpf Final    Specimen HOld 12/19/2024 Urine on hold in Microbiology dept for 2 days.  If unpreserved urine is submitted, it cannot be used for addtional testing after 24 hours, recollection will be required.    Final    Source 12/19/2024 Nasopharyngeal    Final    SARS-CoV-2, PCR 12/19/2024 Not detected  NOTD   Final    Not Detected results do not preclude SARS-CoV-2 infection and should not be used as the sole basis for patient management decisions.Results must be combined with clinical observations, patient history, and epidemiological information.    Rapid Influenza A By PCR 12/19/2024 Not detected  NOTD   Final    Rapid Influenza B By PCR 12/19/2024 Not detected  NOTD   Final    Comment: Testing was performed using sarah beth Tami SARS-CoV-2 and Influenza A/B nucleic acid assay.  This test is a multiplex Real-Time Reverse Transcriptase Polymerase Chain Reaction (RT-PCR) based in vitro diagnostic test intended for the qualitative detection of nucleic acids from SARS-CoV-2, Influenza A, and Influenza B in nasopharyngeal and nasal swab specimens for use under the FDA's Emergency Use Authorization (EUA) only.     Fact sheet for Patients: https://www.fda.gov/media/200267/download  Fact sheet for Healthcare Providers: https://www.fda.fov/media/885021/download           Objective:       Exam:  Vitals:    01/16/25 1111   BP: 110/60   Pulse: 68   Temp: (!) 95.5 °F (35.3 °C)   SpO2: 99%     Gen: Awake, alert, follows commands  Appropriate appearance,

## 2025-01-16 ENCOUNTER — TELEPHONE (OUTPATIENT)
Age: 65
End: 2025-01-16

## 2025-01-16 ENCOUNTER — OFFICE VISIT (OUTPATIENT)
Age: 65
End: 2025-01-16
Payer: MEDICARE

## 2025-01-16 VITALS
OXYGEN SATURATION: 99 % | DIASTOLIC BLOOD PRESSURE: 60 MMHG | BODY MASS INDEX: 31.19 KG/M2 | HEART RATE: 68 BPM | HEIGHT: 72 IN | TEMPERATURE: 95.5 F | SYSTOLIC BLOOD PRESSURE: 110 MMHG

## 2025-01-16 DIAGNOSIS — I69.30 SEQUELAE, POST-STROKE: Primary | ICD-10-CM

## 2025-01-16 PROCEDURE — 3074F SYST BP LT 130 MM HG: CPT | Performed by: PSYCHIATRY & NEUROLOGY

## 2025-01-16 PROCEDURE — 3078F DIAST BP <80 MM HG: CPT | Performed by: PSYCHIATRY & NEUROLOGY

## 2025-01-16 PROCEDURE — 99215 OFFICE O/P EST HI 40 MIN: CPT | Performed by: PSYCHIATRY & NEUROLOGY

## 2025-01-16 PROCEDURE — 3017F COLORECTAL CA SCREEN DOC REV: CPT | Performed by: PSYCHIATRY & NEUROLOGY

## 2025-01-16 PROCEDURE — G8417 CALC BMI ABV UP PARAM F/U: HCPCS | Performed by: PSYCHIATRY & NEUROLOGY

## 2025-01-16 PROCEDURE — 1036F TOBACCO NON-USER: CPT | Performed by: PSYCHIATRY & NEUROLOGY

## 2025-01-16 PROCEDURE — 1123F ACP DISCUSS/DSCN MKR DOCD: CPT | Performed by: PSYCHIATRY & NEUROLOGY

## 2025-01-16 PROCEDURE — G8428 CUR MEDS NOT DOCUMENT: HCPCS | Performed by: PSYCHIATRY & NEUROLOGY

## 2025-01-16 NOTE — TELEPHONE ENCOUNTER
Patient was seen today and the patient EC Ms. Ovalles wanted to speak with Dr. Romero about a concern before they left. Informed her that the nurse was at lunch right now and if she wanted her to give her a call. She stated she can be reached at [phone]. She didn't say what the concern was but she wanted to speak with Dr. Romero.

## 2025-01-16 NOTE — TELEPHONE ENCOUNTER
Called Romi. HIPAA verified. She states at appt Dr. Romero review some images and would like know when were the images done and also if an electronic glove would be ok for the pt.    Spoke with Dr. Romero in his office and he states the MRI Brain was done in April 2024 when pt was in the hospital and also that he has no comment on the electric glove because he has not uses or referred any of his pts.

## 2025-01-17 NOTE — TELEPHONE ENCOUNTER
Called Romi. HIPAA verified. Inform her that Dr. Romero states he was reviewing the MRI Brain that was done in April 2024 when pt was in the hospital and also that he has no comment on the electric glove because he has not uses or referred any of his pts. Romi verbalizes understanding.

## 2025-02-28 ENCOUNTER — TRANSCRIBE ORDERS (OUTPATIENT)
Facility: HOSPITAL | Age: 65
End: 2025-02-28

## 2025-02-28 DIAGNOSIS — R31.9 HEMATURIA, UNSPECIFIED TYPE: Primary | ICD-10-CM

## 2025-03-07 ENCOUNTER — HOSPITAL ENCOUNTER (OUTPATIENT)
Facility: HOSPITAL | Age: 65
Discharge: HOME | End: 2025-03-10
Attending: UROLOGY
Payer: MEDICARE

## 2025-03-07 DIAGNOSIS — R31.9 HEMATURIA, UNSPECIFIED TYPE: ICD-10-CM

## 2025-03-07 PROCEDURE — 6360000004 HC RX CONTRAST MEDICATION: Performed by: UROLOGY

## 2025-03-07 PROCEDURE — 74178 CT ABD&PLV WO CNTR FLWD CNTR: CPT

## 2025-03-07 RX ORDER — IOPAMIDOL 755 MG/ML
100 INJECTION, SOLUTION INTRAVASCULAR
Status: COMPLETED | OUTPATIENT
Start: 2025-03-07 | End: 2025-03-07

## 2025-03-07 RX ADMIN — IOPAMIDOL 100 ML: 755 INJECTION, SOLUTION INTRAVENOUS at 18:16

## 2025-03-31 ENCOUNTER — HOSPITAL ENCOUNTER (EMERGENCY)
Facility: HOSPITAL | Age: 65
Discharge: HOME | End: 2025-04-01
Attending: EMERGENCY MEDICINE
Payer: MEDICARE

## 2025-03-31 DIAGNOSIS — R31.9 HEMATURIA, UNSPECIFIED TYPE: Primary | ICD-10-CM

## 2025-03-31 DIAGNOSIS — R31.9 URINARY TRACT INFECTION WITH HEMATURIA, SITE UNSPECIFIED: ICD-10-CM

## 2025-03-31 DIAGNOSIS — N39.0 URINARY TRACT INFECTION WITH HEMATURIA, SITE UNSPECIFIED: ICD-10-CM

## 2025-03-31 LAB
ALBUMIN SERPL-MCNC: 3.3 G/DL (ref 3.5–5)
ALBUMIN/GLOB SERPL: 0.9 (ref 1.1–2.2)
ALP SERPL-CCNC: 67 U/L (ref 45–117)
ALT SERPL-CCNC: 68 U/L (ref 12–78)
ANION GAP SERPL CALC-SCNC: 3 MMOL/L (ref 2–12)
AST SERPL-CCNC: 33 U/L (ref 15–37)
BASOPHILS # BLD: 0.02 K/UL (ref 0–0.1)
BASOPHILS NFR BLD: 0.5 % (ref 0–1)
BILIRUB SERPL-MCNC: 0.5 MG/DL (ref 0.2–1)
BUN SERPL-MCNC: 15 MG/DL (ref 6–20)
BUN/CREAT SERPL: 18 (ref 12–20)
CALCIUM SERPL-MCNC: 8.8 MG/DL (ref 8.5–10.1)
CHLORIDE SERPL-SCNC: 109 MMOL/L (ref 97–108)
CO2 SERPL-SCNC: 29 MMOL/L (ref 21–32)
CREAT SERPL-MCNC: 0.85 MG/DL (ref 0.7–1.3)
DIFFERENTIAL METHOD BLD: ABNORMAL
EOSINOPHIL # BLD: 0.14 K/UL (ref 0–0.4)
EOSINOPHIL NFR BLD: 3.3 % (ref 0–7)
ERYTHROCYTE [DISTWIDTH] IN BLOOD BY AUTOMATED COUNT: 15.4 % (ref 11.5–14.5)
GLOBULIN SER CALC-MCNC: 3.6 G/DL (ref 2–4)
GLUCOSE SERPL-MCNC: 85 MG/DL (ref 65–100)
HCT VFR BLD AUTO: 36.3 % (ref 36.6–50.3)
HGB BLD-MCNC: 11.9 G/DL (ref 12.1–17)
IMM GRANULOCYTES # BLD AUTO: 0.01 K/UL (ref 0–0.04)
IMM GRANULOCYTES NFR BLD AUTO: 0.2 % (ref 0–0.5)
LIPASE SERPL-CCNC: 100 U/L (ref 13–75)
LYMPHOCYTES # BLD: 2.51 K/UL (ref 0.8–3.5)
LYMPHOCYTES NFR BLD: 59.1 % (ref 12–49)
MCH RBC QN AUTO: 31.6 PG (ref 26–34)
MCHC RBC AUTO-ENTMCNC: 32.8 G/DL (ref 30–36.5)
MCV RBC AUTO: 96.5 FL (ref 80–99)
MONOCYTES # BLD: 0.33 K/UL (ref 0–1)
MONOCYTES NFR BLD: 7.8 % (ref 5–13)
NEUTS SEG # BLD: 1.24 K/UL (ref 1.8–8)
NEUTS SEG NFR BLD: 29.1 % (ref 32–75)
NRBC # BLD: 0 K/UL (ref 0–0.01)
NRBC BLD-RTO: 0 PER 100 WBC
PLATELET # BLD AUTO: 269 K/UL (ref 150–400)
PMV BLD AUTO: 9.7 FL (ref 8.9–12.9)
POTASSIUM SERPL-SCNC: 3.5 MMOL/L (ref 3.5–5.1)
PROT SERPL-MCNC: 6.9 G/DL (ref 6.4–8.2)
RBC # BLD AUTO: 3.76 M/UL (ref 4.1–5.7)
SODIUM SERPL-SCNC: 141 MMOL/L (ref 136–145)
WBC # BLD AUTO: 4.3 K/UL (ref 4.1–11.1)

## 2025-03-31 PROCEDURE — 85025 COMPLETE CBC W/AUTO DIFF WBC: CPT

## 2025-03-31 PROCEDURE — 36415 COLL VENOUS BLD VENIPUNCTURE: CPT

## 2025-03-31 PROCEDURE — 99285 EMERGENCY DEPT VISIT HI MDM: CPT

## 2025-03-31 PROCEDURE — 93005 ELECTROCARDIOGRAM TRACING: CPT | Performed by: STUDENT IN AN ORGANIZED HEALTH CARE EDUCATION/TRAINING PROGRAM

## 2025-03-31 PROCEDURE — 84484 ASSAY OF TROPONIN QUANT: CPT

## 2025-03-31 PROCEDURE — 83690 ASSAY OF LIPASE: CPT

## 2025-03-31 PROCEDURE — 81001 URINALYSIS AUTO W/SCOPE: CPT

## 2025-03-31 PROCEDURE — 80053 COMPREHEN METABOLIC PANEL: CPT

## 2025-04-01 ENCOUNTER — APPOINTMENT (OUTPATIENT)
Facility: HOSPITAL | Age: 65
End: 2025-04-01
Payer: MEDICARE

## 2025-04-01 VITALS
OXYGEN SATURATION: 98 % | DIASTOLIC BLOOD PRESSURE: 101 MMHG | BODY MASS INDEX: 27.9 KG/M2 | WEIGHT: 205.69 LBS | SYSTOLIC BLOOD PRESSURE: 141 MMHG | RESPIRATION RATE: 13 BRPM | HEART RATE: 61 BPM | TEMPERATURE: 97.9 F

## 2025-04-01 LAB
APPEARANCE UR: ABNORMAL
BACTERIA URNS QL MICRO: NEGATIVE /HPF
BILIRUB UR QL CFM: NEGATIVE
CAOX CRY URNS QL MICRO: ABNORMAL
COLOR UR: ABNORMAL
EKG ATRIAL RATE: 48 BPM
EKG DIAGNOSIS: NORMAL
EKG P AXIS: 26 DEGREES
EKG P-R INTERVAL: 174 MS
EKG Q-T INTERVAL: 456 MS
EKG QRS DURATION: 84 MS
EKG QTC CALCULATION (BAZETT): 407 MS
EKG R AXIS: -5 DEGREES
EKG T AXIS: 8 DEGREES
EKG VENTRICULAR RATE: 48 BPM
EPITH CASTS URNS QL MICRO: ABNORMAL /LPF
GLUCOSE UR STRIP.AUTO-MCNC: NEGATIVE MG/DL
HGB UR QL STRIP: ABNORMAL
KETONES UR QL STRIP.AUTO: NEGATIVE MG/DL
LEUKOCYTE ESTERASE UR QL STRIP.AUTO: ABNORMAL
NITRITE UR QL STRIP.AUTO: POSITIVE
PH UR STRIP: 6.5 (ref 5–8)
PROT UR STRIP-MCNC: 100 MG/DL
RBC #/AREA URNS HPF: >100 /HPF (ref 0–5)
SP GR UR REFRACTOMETRY: 1.02 (ref 1–1.03)
TROPONIN I SERPL HS-MCNC: 8 NG/L (ref 0–76)
UROBILINOGEN UR QL STRIP.AUTO: 1 EU/DL (ref 0.2–1)
WBC URNS QL MICRO: ABNORMAL /HPF (ref 0–4)

## 2025-04-01 PROCEDURE — 87086 URINE CULTURE/COLONY COUNT: CPT

## 2025-04-01 PROCEDURE — 6360000004 HC RX CONTRAST MEDICATION: Performed by: EMERGENCY MEDICINE

## 2025-04-01 PROCEDURE — 71045 X-RAY EXAM CHEST 1 VIEW: CPT

## 2025-04-01 PROCEDURE — 87077 CULTURE AEROBIC IDENTIFY: CPT

## 2025-04-01 PROCEDURE — 87186 SC STD MICRODIL/AGAR DIL: CPT

## 2025-04-01 PROCEDURE — 74177 CT ABD & PELVIS W/CONTRAST: CPT

## 2025-04-01 PROCEDURE — 93010 ELECTROCARDIOGRAM REPORT: CPT | Performed by: INTERNAL MEDICINE

## 2025-04-01 PROCEDURE — 6370000000 HC RX 637 (ALT 250 FOR IP): Performed by: EMERGENCY MEDICINE

## 2025-04-01 RX ORDER — CIPROFLOXACIN 500 MG/1
500 TABLET, FILM COATED ORAL 2 TIMES DAILY
Qty: 14 TABLET | Refills: 0 | Status: SHIPPED | OUTPATIENT
Start: 2025-04-01 | End: 2025-04-08

## 2025-04-01 RX ORDER — CIPROFLOXACIN 500 MG/1
500 TABLET, FILM COATED ORAL
Status: COMPLETED | OUTPATIENT
Start: 2025-04-01 | End: 2025-04-01

## 2025-04-01 RX ORDER — IOPAMIDOL 755 MG/ML
100 INJECTION, SOLUTION INTRAVASCULAR
Status: COMPLETED | OUTPATIENT
Start: 2025-04-01 | End: 2025-04-01

## 2025-04-01 RX ORDER — CIPROFLOXACIN 500 MG/1
500 TABLET, FILM COATED ORAL 2 TIMES DAILY
Qty: 14 TABLET | Refills: 0 | Status: SHIPPED | OUTPATIENT
Start: 2025-04-01 | End: 2025-04-01

## 2025-04-01 RX ADMIN — IOPAMIDOL 100 ML: 755 INJECTION, SOLUTION INTRAVENOUS at 00:28

## 2025-04-01 RX ADMIN — CIPROFLOXACIN HYDROCHLORIDE 500 MG: 500 TABLET, FILM COATED ORAL at 02:23

## 2025-04-01 NOTE — ED PROVIDER NOTES
64-year-old man signed out to me by previous provider pending results of the remainder of his workup including a CT abdomen and pelvis.  Please see previous notes for details.    For the most part urinalysis seems to indicate just gross hematuria but he is positive for nitrites.  He has been mostly positive for Enterococcus faecalis on prior urine cultures.  Based on the cultures and going to prescribe him Cipro as he is allergic to penicillins.  His CT does not show any new findings and otherwise his workup is reassuring.  Looking at the urine at bedside it does look quite thick and I think it may see a few clots in his bladder.  I recommended to the wife that he have a Sun catheter placed due to my concerns of the reported pain he had may have been from some urinary retention.  Patient's wife ultimately declined.  She tells me she is going to call his urologist in the morning to discuss.  He was prescribed Cipro and discharged in stable condition                 Surinder Stone MD  04/02/25 4201

## 2025-04-01 NOTE — ED NOTES
Bedside and Verbal shift change report given to ISI Osorio (oncoming nurse) by ISI Mejia (offgoing nurse). Report included the following information Index, ED SBAR, MAR, and Recent Results.

## 2025-04-01 NOTE — DISCHARGE INSTRUCTIONS
Return to the emergency department with any new or worsening symptoms especially pain, inability to urinate, or fever.  Please take the antibiotics as prescribed and follow-up with your urologist

## 2025-04-01 NOTE — ED PROVIDER NOTES
[facility] EMERGENCY DEPARTMENT  EMERGENCY DEPARTMENT ENCOUNTER      Pt Name: Taj Clemens  MRN: [id number]  Birthdate 1960  Date of evaluation: 3/31/2025  Provider: MARITA Roblero    CHIEF COMPLAINT       Chief Complaint   Patient presents with    Abdominal Pain    Flank Pain         HISTORY OF PRESENT ILLNESS    Taj Clemens is a 64 y.o. male with a history of hypertension, CVA with aphasia, right-sided weakness neurogenic bladder previously requiring straight catheterization, atrial fibrillation on Eliquis who presents to ED via EMS from [facility] due to \"torso pain.\"  Another reports patient is having flank pain, generalized abdominal pain, hematuria which have been worsening.  Noted history of kidney stones and reports hematuria has been present since then.  Reports tonight patient put his hand on his chest which she thinks signified chest pain.  Denies any fever, chills, cough, vomiting, diarrhea, constipation.             Review of External Medical Records:     Nursing Notes were reviewed.    REVIEW OF SYSTEMS       Review of Systems   Unable to perform ROS: Patient nonverbal       Except as noted above the remainder of the review of systems was reviewed and negative.       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

## 2025-04-01 NOTE — ED TRIAGE NOTES
Patient arrived via EMS for \"torso pain.\"  Nadya says it is actually chest pain, flank pain, and abdominal pain.  Came from [facility].  Providers at [facility] says vitals and EKG were fine, didn't want to call.     Nadya says he does currently have kidney stones bilaterally.  Blood in urine, per nadya.  No fevers, chills, or cough.    On BP meds and blood thinners for afib.    Hx of stroke, right sided deficit.  Global aphasia.

## 2025-04-02 ENCOUNTER — RESULTS FOLLOW-UP (OUTPATIENT)
Facility: HOSPITAL | Age: 65
End: 2025-04-02

## 2025-04-05 ENCOUNTER — APPOINTMENT (OUTPATIENT)
Facility: HOSPITAL | Age: 65
End: 2025-04-05
Payer: MEDICARE

## 2025-04-05 ENCOUNTER — HOSPITAL ENCOUNTER (EMERGENCY)
Facility: HOSPITAL | Age: 65
Discharge: NURSING, REHAB OR LONG-TERM CARE | End: 2025-04-05
Attending: EMERGENCY MEDICINE
Payer: MEDICARE

## 2025-04-05 VITALS
HEIGHT: 72 IN | BODY MASS INDEX: 27.77 KG/M2 | RESPIRATION RATE: 16 BRPM | SYSTOLIC BLOOD PRESSURE: 150 MMHG | WEIGHT: 205 LBS | DIASTOLIC BLOOD PRESSURE: 90 MMHG | TEMPERATURE: 98.7 F | HEART RATE: 63 BPM | OXYGEN SATURATION: 99 %

## 2025-04-05 DIAGNOSIS — R33.9 URINARY RETENTION: Primary | ICD-10-CM

## 2025-04-05 DIAGNOSIS — N43.3 BILATERAL HYDROCELE: ICD-10-CM

## 2025-04-05 LAB
ALBUMIN SERPL-MCNC: 3.7 G/DL (ref 3.5–5)
ALBUMIN/GLOB SERPL: 0.9 (ref 1.1–2.2)
ALP SERPL-CCNC: 70 U/L (ref 45–117)
ALT SERPL-CCNC: 55 U/L (ref 12–78)
ANION GAP SERPL CALC-SCNC: 4 MMOL/L (ref 2–12)
APPEARANCE UR: ABNORMAL
AST SERPL-CCNC: 28 U/L (ref 15–37)
BACTERIA URNS QL MICRO: ABNORMAL /HPF
BASOPHILS # BLD: 0.02 K/UL (ref 0–0.1)
BASOPHILS NFR BLD: 0.5 % (ref 0–1)
BILIRUB DIRECT SERPL-MCNC: 0.3 MG/DL (ref 0–0.2)
BILIRUB SERPL-MCNC: 1 MG/DL (ref 0.2–1)
BILIRUB UR QL CFM: NEGATIVE
BUN SERPL-MCNC: 15 MG/DL (ref 6–20)
BUN/CREAT SERPL: 17 (ref 12–20)
CALCIUM SERPL-MCNC: 9.7 MG/DL (ref 8.5–10.1)
CHLORIDE SERPL-SCNC: 108 MMOL/L (ref 97–108)
CO2 SERPL-SCNC: 28 MMOL/L (ref 21–32)
COLOR UR: ABNORMAL
CREAT SERPL-MCNC: 0.88 MG/DL (ref 0.7–1.3)
DIFFERENTIAL METHOD BLD: ABNORMAL
EOSINOPHIL # BLD: 0.09 K/UL (ref 0–0.4)
EOSINOPHIL NFR BLD: 2.1 % (ref 0–7)
EPITH CASTS URNS QL MICRO: ABNORMAL /LPF
ERYTHROCYTE [DISTWIDTH] IN BLOOD BY AUTOMATED COUNT: 15.8 % (ref 11.5–14.5)
GLOBULIN SER CALC-MCNC: 4.1 G/DL (ref 2–4)
GLUCOSE SERPL-MCNC: 94 MG/DL (ref 65–100)
GLUCOSE UR STRIP.AUTO-MCNC: NEGATIVE MG/DL
HCT VFR BLD AUTO: 35.5 % (ref 36.6–50.3)
HGB BLD-MCNC: 11.9 G/DL (ref 12.1–17)
HGB UR QL STRIP: ABNORMAL
IMM GRANULOCYTES # BLD AUTO: 0 K/UL (ref 0–0.04)
IMM GRANULOCYTES NFR BLD AUTO: 0 % (ref 0–0.5)
KETONES UR QL STRIP.AUTO: NEGATIVE MG/DL
LEUKOCYTE ESTERASE UR QL STRIP.AUTO: ABNORMAL
LIPASE SERPL-CCNC: 41 U/L (ref 13–75)
LYMPHOCYTES # BLD: 1.54 K/UL (ref 0.8–3.5)
LYMPHOCYTES NFR BLD: 36 % (ref 12–49)
MCH RBC QN AUTO: 32 PG (ref 26–34)
MCHC RBC AUTO-ENTMCNC: 33.5 G/DL (ref 30–36.5)
MCV RBC AUTO: 95.4 FL (ref 80–99)
MONOCYTES # BLD: 0.33 K/UL (ref 0–1)
MONOCYTES NFR BLD: 7.7 % (ref 5–13)
NEUTS SEG # BLD: 2.3 K/UL (ref 1.8–8)
NEUTS SEG NFR BLD: 53.7 % (ref 32–75)
NITRITE UR QL STRIP.AUTO: NEGATIVE
NRBC # BLD: 0 K/UL (ref 0–0.01)
NRBC BLD-RTO: 0 PER 100 WBC
PH UR STRIP: 5 (ref 5–8)
PLATELET # BLD AUTO: 317 K/UL (ref 150–400)
PMV BLD AUTO: 10.7 FL (ref 8.9–12.9)
POTASSIUM SERPL-SCNC: 3.8 MMOL/L (ref 3.5–5.1)
PROT SERPL-MCNC: 7.8 G/DL (ref 6.4–8.2)
PROT UR STRIP-MCNC: 100 MG/DL
RBC # BLD AUTO: 3.72 M/UL (ref 4.1–5.7)
RBC #/AREA URNS HPF: >100 /HPF (ref 0–5)
SODIUM SERPL-SCNC: 140 MMOL/L (ref 136–145)
SP GR UR REFRACTOMETRY: 1.03 (ref 1–1.03)
URINE CULTURE IF INDICATED: ABNORMAL
UROBILINOGEN UR QL STRIP.AUTO: 0.2 EU/DL (ref 0.2–1)
WBC # BLD AUTO: 4.3 K/UL (ref 4.1–11.1)
WBC URNS QL MICRO: ABNORMAL /HPF (ref 0–4)

## 2025-04-05 PROCEDURE — 74177 CT ABD & PELVIS W/CONTRAST: CPT

## 2025-04-05 PROCEDURE — 99285 EMERGENCY DEPT VISIT HI MDM: CPT

## 2025-04-05 PROCEDURE — 6360000004 HC RX CONTRAST MEDICATION: Performed by: EMERGENCY MEDICINE

## 2025-04-05 PROCEDURE — 36415 COLL VENOUS BLD VENIPUNCTURE: CPT

## 2025-04-05 PROCEDURE — 76870 US EXAM SCROTUM: CPT

## 2025-04-05 PROCEDURE — 94761 N-INVAS EAR/PLS OXIMETRY MLT: CPT

## 2025-04-05 PROCEDURE — 81001 URINALYSIS AUTO W/SCOPE: CPT

## 2025-04-05 PROCEDURE — 80048 BASIC METABOLIC PNL TOTAL CA: CPT

## 2025-04-05 PROCEDURE — 80076 HEPATIC FUNCTION PANEL: CPT

## 2025-04-05 PROCEDURE — 51703 INSERT BLADDER CATH COMPLEX: CPT

## 2025-04-05 PROCEDURE — 85025 COMPLETE CBC W/AUTO DIFF WBC: CPT

## 2025-04-05 PROCEDURE — 83690 ASSAY OF LIPASE: CPT

## 2025-04-05 RX ORDER — POLYETHYLENE GLYCOL 3350 17 G/17G
17 POWDER, FOR SOLUTION ORAL 2 TIMES DAILY PRN
Qty: 116 G | Refills: 0 | Status: SHIPPED | OUTPATIENT
Start: 2025-04-05

## 2025-04-05 RX ORDER — IOPAMIDOL 755 MG/ML
100 INJECTION, SOLUTION INTRAVASCULAR
Status: COMPLETED | OUTPATIENT
Start: 2025-04-05 | End: 2025-04-05

## 2025-04-05 RX ADMIN — IOPAMIDOL 100 ML: 755 INJECTION, SOLUTION INTRAVENOUS at 16:20

## 2025-04-05 ASSESSMENT — PAIN - FUNCTIONAL ASSESSMENT
PAIN_FUNCTIONAL_ASSESSMENT: NONE - DENIES PAIN
PAIN_FUNCTIONAL_ASSESSMENT: NONE - DENIES PAIN

## 2025-04-05 NOTE — ED PROVIDER NOTES
[facility] EMERGENCY DEPARTMENT  EMERGENCY DEPARTMENT ENCOUNTER      Pt Name: Taj Clemens  MRN: [id number]  Birthdate 1960  Date of evaluation: 4/5/2025  Provider: Surinder Stone MD      HISTORY OF PRESENT ILLNESS      HPI        Nursing Notes were reviewed.    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         SURGICAL HISTORY       Past Surgical History:   Procedure Laterality Date    HEMORRHOID SURGERY  1993    IR MECHANICAL ART THROMBECTOMY INTRACRANIAL  3/24/2024    IR MECHANICAL ART THROMBECTOMY INTRACRANIAL 3/24/2024 [facility] RAD ANGIO IR         CURRENT MEDICATIONS       Previous Medications    ACETAMINOPHEN (TYLENOL) 325 MG TABLET    Take 2 tablets by mouth every 6 hours as needed for Pain    AMIODARONE (CORDARONE) 200 MG TABLET    Take 1 tablet by mouth three times a week    AMLODIPINE (NORVASC) 5 MG TABLET    Take 1 tablet by mouth daily    APIXABAN (ELIQUIS) 5 MG TABS TABLET    Take by mouth 2 times daily    ASCORBIC ACID (VITAMIN C) 500 MG TABLET    Take 1 tablet by mouth 2 times daily    ASPIRIN 81 MG CHEWABLE TABLET    Take 1 tablet by mouth daily    ATORVASTATIN (LIPITOR) 10 MG TABLET    Take 1 tablet by mouth nightly    BACLOFEN (LIORESAL) 10 MG TABLET    Take 1 tablet by mouth 3 times daily    CARVEDILOL (COREG) 3.125 MG TABLET    Take 4 tablets by mouth 2 times daily    CIPROFLOXACIN (CIPRO) 500 MG TABLET    Take 1 tablet by mouth 2 times daily for 7 days    CYANOCOBALAMIN (VITAMIN B 12 PO)    Take 1,000 mcg by mouth    DICLOFENAC SODIUM (VOLTAREN) 1 % GEL    Apply 2 g topically 4 times daily as needed for Pain    FINASTERIDE (PROSCAR) 5 MG TABLET  Bilateral testicular swelling but no tenderness  Skin:     General: Skin is warm and dry.   Neurological:      Comments: Awake and alert.             EMERGENCY DEPARTMENT COURSE and DIFFERENTIAL DIAGNOSIS/MDM:   Vitals:    Vitals:    04/05/25 1304   BP: (!) 152/95   Pulse: 64   Resp: 16   Temp: 98.7 °F (37.1 °C)   TempSrc: Oral   SpO2: 99%   Weight: 93 kg (205 lb)   Height: 1.829 m (6')         Medical Decision Making  Amount and/or Complexity of Data Reviewed  Labs: ordered.  Radiology: ordered.    Risk  Prescription drug management.      Patient presenting with the above chief complaint.  Vital signs are stable.  His exam is benign.  Minimal tenderness.  Close to 500 cc of urine in his bladder.  Chart review shows that it was recommended patient had a Sun previously because of the clots and hematuria and giving that he is having urinary retention now Sun catheter placed.  Urine appears clean.  Urine culture grew out organisms that were sensitive to the Cipro he was prescribed.  Patient later developed some upper abdominal pain and his wife requested a CT which was ordered.  It shows some constipation but no other acute findings.  Discussed urology follow-up and patient discharged in stable condition      REASSESSMENT          CONSULTS:  None    PROCEDURES:     Procedures          (Please note that portions of this note were completed with a voice recognition program.  Efforts were made to edit the dictations but occasionally words are mis-transcribed.)    Surinder Stone MD (electronically signed)  Emergency Attending Physician              Surinder Stone MD  04/08/25 5347

## 2025-04-05 NOTE — ED TRIAGE NOTES
Patient arrived via EMS from [facility] c/o urinary retention x 1 day. Per EMS pt is aphasic and bed bound at baseline d/t previous stroke. Straight caths 4x daily for urinary retention, but staff has been unable to successfully straight cath x 1 day. Patient able to nod appropriately, denies pain. Bladder scan shows 426 mL urine present.

## 2025-04-05 NOTE — DISCHARGE INSTRUCTIONS
Return with any new or worsening symptoms.  In the meantime please follow-up with your urologist.  You should complete the course of ciprofloxacin as well if you have not already done so.  I also recommend you start taking MiraLAX twice daily until you are having regular bowel movements.  After that I recommend maintenance MiraLAX once daily.  I also recommend other medications to help your bowel movements such as senna and docusate

## 2025-04-25 ENCOUNTER — APPOINTMENT (OUTPATIENT)
Facility: HOSPITAL | Age: 65
DRG: 699 | End: 2025-04-25
Payer: MEDICARE

## 2025-04-25 ENCOUNTER — HOSPITAL ENCOUNTER (INPATIENT)
Facility: HOSPITAL | Age: 65
LOS: 1 days | Discharge: NURSING, REHAB OR LONG-TERM CARE | DRG: 699 | End: 2025-04-28
Attending: STUDENT IN AN ORGANIZED HEALTH CARE EDUCATION/TRAINING PROGRAM | Admitting: STUDENT IN AN ORGANIZED HEALTH CARE EDUCATION/TRAINING PROGRAM
Payer: MEDICARE

## 2025-04-25 DIAGNOSIS — T83.511A URINARY TRACT INFECTION ASSOCIATED WITH CATHETERIZATION OF URINARY TRACT, UNSPECIFIED INDWELLING URINARY CATHETER TYPE, INITIAL ENCOUNTER: ICD-10-CM

## 2025-04-25 DIAGNOSIS — N20.1 URETEROLITHIASIS: ICD-10-CM

## 2025-04-25 DIAGNOSIS — N39.0 URINARY TRACT INFECTION ASSOCIATED WITH CATHETERIZATION OF URINARY TRACT, UNSPECIFIED INDWELLING URINARY CATHETER TYPE, INITIAL ENCOUNTER: ICD-10-CM

## 2025-04-25 DIAGNOSIS — R40.4 EPISODE OF UNRESPONSIVENESS: ICD-10-CM

## 2025-04-25 DIAGNOSIS — R55 SYNCOPE, UNSPECIFIED SYNCOPE TYPE: Primary | ICD-10-CM

## 2025-04-25 DIAGNOSIS — R31.9 HEMATURIA, UNSPECIFIED TYPE: ICD-10-CM

## 2025-04-25 LAB
ALBUMIN SERPL-MCNC: 3.5 G/DL (ref 3.5–5)
ALBUMIN/GLOB SERPL: 0.9 (ref 1.1–2.2)
ALP SERPL-CCNC: 69 U/L (ref 45–117)
ALT SERPL-CCNC: 74 U/L (ref 12–78)
ANION GAP SERPL CALC-SCNC: 3 MMOL/L (ref 2–12)
APPEARANCE UR: CLEAR
AST SERPL-CCNC: 32 U/L (ref 15–37)
BACTERIA URNS QL MICRO: ABNORMAL /HPF
BASOPHILS # BLD: 0.03 K/UL (ref 0–0.1)
BASOPHILS NFR BLD: 0.5 % (ref 0–1)
BILIRUB SERPL-MCNC: 0.9 MG/DL (ref 0.2–1)
BILIRUB UR QL: NEGATIVE
BUN SERPL-MCNC: 12 MG/DL (ref 6–20)
BUN/CREAT SERPL: 13 (ref 12–20)
CALCIUM SERPL-MCNC: 9.2 MG/DL (ref 8.5–10.1)
CHLORIDE SERPL-SCNC: 108 MMOL/L (ref 97–108)
CO2 SERPL-SCNC: 30 MMOL/L (ref 21–32)
COLOR UR: ABNORMAL
COMMENT:: NORMAL
CREAT SERPL-MCNC: 0.93 MG/DL (ref 0.7–1.3)
DIFFERENTIAL METHOD BLD: ABNORMAL
EOSINOPHIL # BLD: 0.18 K/UL (ref 0–0.4)
EOSINOPHIL NFR BLD: 3.3 % (ref 0–7)
EPITH CASTS URNS QL MICRO: ABNORMAL /LPF
ERYTHROCYTE [DISTWIDTH] IN BLOOD BY AUTOMATED COUNT: 15.5 % (ref 11.5–14.5)
GLOBULIN SER CALC-MCNC: 3.9 G/DL (ref 2–4)
GLUCOSE SERPL-MCNC: 74 MG/DL (ref 65–100)
GLUCOSE UR STRIP.AUTO-MCNC: NEGATIVE MG/DL
HCT VFR BLD AUTO: 35.7 % (ref 36.6–50.3)
HGB BLD-MCNC: 11.6 G/DL (ref 12.1–17)
HGB UR QL STRIP: ABNORMAL
IMM GRANULOCYTES # BLD AUTO: 0.01 K/UL (ref 0–0.04)
IMM GRANULOCYTES NFR BLD AUTO: 0.2 % (ref 0–0.5)
KETONES UR QL STRIP.AUTO: NEGATIVE MG/DL
LEUKOCYTE ESTERASE UR QL STRIP.AUTO: ABNORMAL
LYMPHOCYTES # BLD: 2.51 K/UL (ref 0.8–3.5)
LYMPHOCYTES NFR BLD: 45.8 % (ref 12–49)
MAGNESIUM SERPL-MCNC: 2.1 MG/DL (ref 1.6–2.4)
MCH RBC QN AUTO: 31.6 PG (ref 26–34)
MCHC RBC AUTO-ENTMCNC: 32.5 G/DL (ref 30–36.5)
MCV RBC AUTO: 97.3 FL (ref 80–99)
MONOCYTES # BLD: 0.39 K/UL (ref 0–1)
MONOCYTES NFR BLD: 7.1 % (ref 5–13)
NEUTS SEG # BLD: 2.36 K/UL (ref 1.8–8)
NEUTS SEG NFR BLD: 43.1 % (ref 32–75)
NITRITE UR QL STRIP.AUTO: NEGATIVE
NRBC # BLD: 0 K/UL (ref 0–0.01)
NRBC BLD-RTO: 0 PER 100 WBC
PH UR STRIP: 7.5 (ref 5–8)
PLATELET # BLD AUTO: 319 K/UL (ref 150–400)
PMV BLD AUTO: 9.6 FL (ref 8.9–12.9)
POTASSIUM SERPL-SCNC: 3.9 MMOL/L (ref 3.5–5.1)
PROT SERPL-MCNC: 7.4 G/DL (ref 6.4–8.2)
PROT UR STRIP-MCNC: 30 MG/DL
RBC # BLD AUTO: 3.67 M/UL (ref 4.1–5.7)
RBC #/AREA URNS HPF: >100 /HPF (ref 0–5)
SODIUM SERPL-SCNC: 141 MMOL/L (ref 136–145)
SP GR UR REFRACTOMETRY: 1 (ref 1–1.03)
SPECIMEN HOLD: NORMAL
TROPONIN I SERPL HS-MCNC: 8 NG/L (ref 0–76)
URINE CULTURE IF INDICATED: ABNORMAL
UROBILINOGEN UR QL STRIP.AUTO: 1 EU/DL (ref 0.2–1)
WBC # BLD AUTO: 5.5 K/UL (ref 4.1–11.1)
WBC URNS QL MICRO: ABNORMAL /HPF (ref 0–4)

## 2025-04-25 PROCEDURE — 6360000004 HC RX CONTRAST MEDICATION: Performed by: STUDENT IN AN ORGANIZED HEALTH CARE EDUCATION/TRAINING PROGRAM

## 2025-04-25 PROCEDURE — 81001 URINALYSIS AUTO W/SCOPE: CPT

## 2025-04-25 PROCEDURE — 87088 URINE BACTERIA CULTURE: CPT

## 2025-04-25 PROCEDURE — 99285 EMERGENCY DEPT VISIT HI MDM: CPT

## 2025-04-25 PROCEDURE — 94761 N-INVAS EAR/PLS OXIMETRY MLT: CPT

## 2025-04-25 PROCEDURE — 36415 COLL VENOUS BLD VENIPUNCTURE: CPT

## 2025-04-25 PROCEDURE — 83735 ASSAY OF MAGNESIUM: CPT

## 2025-04-25 PROCEDURE — G0378 HOSPITAL OBSERVATION PER HR: HCPCS

## 2025-04-25 PROCEDURE — 93005 ELECTROCARDIOGRAM TRACING: CPT | Performed by: STUDENT IN AN ORGANIZED HEALTH CARE EDUCATION/TRAINING PROGRAM

## 2025-04-25 PROCEDURE — 87186 SC STD MICRODIL/AGAR DIL: CPT

## 2025-04-25 PROCEDURE — 96374 THER/PROPH/DIAG INJ IV PUSH: CPT

## 2025-04-25 PROCEDURE — 87086 URINE CULTURE/COLONY COUNT: CPT

## 2025-04-25 PROCEDURE — 6370000000 HC RX 637 (ALT 250 FOR IP): Performed by: STUDENT IN AN ORGANIZED HEALTH CARE EDUCATION/TRAINING PROGRAM

## 2025-04-25 PROCEDURE — 80053 COMPREHEN METABOLIC PANEL: CPT

## 2025-04-25 PROCEDURE — 70450 CT HEAD/BRAIN W/O DYE: CPT

## 2025-04-25 PROCEDURE — 84484 ASSAY OF TROPONIN QUANT: CPT

## 2025-04-25 PROCEDURE — 74177 CT ABD & PELVIS W/CONTRAST: CPT

## 2025-04-25 PROCEDURE — 6360000002 HC RX W HCPCS: Performed by: STUDENT IN AN ORGANIZED HEALTH CARE EDUCATION/TRAINING PROGRAM

## 2025-04-25 PROCEDURE — 85025 COMPLETE CBC W/AUTO DIFF WBC: CPT

## 2025-04-25 PROCEDURE — 2500000003 HC RX 250 WO HCPCS: Performed by: STUDENT IN AN ORGANIZED HEALTH CARE EDUCATION/TRAINING PROGRAM

## 2025-04-25 RX ORDER — ONDANSETRON 2 MG/ML
4 INJECTION INTRAMUSCULAR; INTRAVENOUS EVERY 6 HOURS PRN
Status: DISCONTINUED | OUTPATIENT
Start: 2025-04-25 | End: 2025-04-28 | Stop reason: HOSPADM

## 2025-04-25 RX ORDER — ONDANSETRON 4 MG/1
4 TABLET, ORALLY DISINTEGRATING ORAL EVERY 8 HOURS PRN
Status: DISCONTINUED | OUTPATIENT
Start: 2025-04-25 | End: 2025-04-28 | Stop reason: HOSPADM

## 2025-04-25 RX ORDER — ENOXAPARIN SODIUM 100 MG/ML
40 INJECTION SUBCUTANEOUS DAILY
Status: DISCONTINUED | OUTPATIENT
Start: 2025-04-26 | End: 2025-04-25 | Stop reason: SDUPTHER

## 2025-04-25 RX ORDER — POLYETHYLENE GLYCOL 3350 17 G/17G
17 POWDER, FOR SOLUTION ORAL 2 TIMES DAILY PRN
Status: DISCONTINUED | OUTPATIENT
Start: 2025-04-25 | End: 2025-04-28 | Stop reason: HOSPADM

## 2025-04-25 RX ORDER — FINASTERIDE 5 MG/1
5 TABLET, FILM COATED ORAL DAILY
Status: DISCONTINUED | OUTPATIENT
Start: 2025-04-26 | End: 2025-04-28 | Stop reason: HOSPADM

## 2025-04-25 RX ORDER — TAMSULOSIN HYDROCHLORIDE 0.4 MG/1
0.4 CAPSULE ORAL
Status: DISCONTINUED | OUTPATIENT
Start: 2025-04-25 | End: 2025-04-28 | Stop reason: HOSPADM

## 2025-04-25 RX ORDER — POTASSIUM CHLORIDE 750 MG/1
40 TABLET, EXTENDED RELEASE ORAL PRN
Status: DISCONTINUED | OUTPATIENT
Start: 2025-04-25 | End: 2025-04-28 | Stop reason: HOSPADM

## 2025-04-25 RX ORDER — CARVEDILOL 12.5 MG/1
12.5 TABLET ORAL 2 TIMES DAILY WITH MEALS
Status: DISCONTINUED | OUTPATIENT
Start: 2025-04-26 | End: 2025-04-28 | Stop reason: HOSPADM

## 2025-04-25 RX ORDER — SODIUM CHLORIDE 9 MG/ML
INJECTION, SOLUTION INTRAVENOUS PRN
Status: DISCONTINUED | OUTPATIENT
Start: 2025-04-25 | End: 2025-04-28 | Stop reason: HOSPADM

## 2025-04-25 RX ORDER — ACETAMINOPHEN 325 MG/1
650 TABLET ORAL EVERY 6 HOURS PRN
Status: DISCONTINUED | OUTPATIENT
Start: 2025-04-25 | End: 2025-04-26

## 2025-04-25 RX ORDER — MAGNESIUM SULFATE IN WATER 40 MG/ML
2000 INJECTION, SOLUTION INTRAVENOUS PRN
Status: DISCONTINUED | OUTPATIENT
Start: 2025-04-25 | End: 2025-04-28 | Stop reason: HOSPADM

## 2025-04-25 RX ORDER — ACETAMINOPHEN 650 MG/1
650 SUPPOSITORY RECTAL EVERY 6 HOURS PRN
Status: DISCONTINUED | OUTPATIENT
Start: 2025-04-25 | End: 2025-04-26

## 2025-04-25 RX ORDER — ATORVASTATIN CALCIUM 10 MG/1
10 TABLET, FILM COATED ORAL NIGHTLY
Status: DISCONTINUED | OUTPATIENT
Start: 2025-04-25 | End: 2025-04-28 | Stop reason: HOSPADM

## 2025-04-25 RX ORDER — POTASSIUM CHLORIDE 7.45 MG/ML
10 INJECTION INTRAVENOUS PRN
Status: DISCONTINUED | OUTPATIENT
Start: 2025-04-25 | End: 2025-04-28 | Stop reason: HOSPADM

## 2025-04-25 RX ORDER — ASPIRIN 81 MG/1
81 TABLET, CHEWABLE ORAL DAILY
Status: DISCONTINUED | OUTPATIENT
Start: 2025-04-26 | End: 2025-04-28 | Stop reason: HOSPADM

## 2025-04-25 RX ORDER — AMIODARONE HYDROCHLORIDE 200 MG/1
200 TABLET ORAL
Status: DISCONTINUED | OUTPATIENT
Start: 2025-04-28 | End: 2025-04-28 | Stop reason: HOSPADM

## 2025-04-25 RX ORDER — SODIUM CHLORIDE 0.9 % (FLUSH) 0.9 %
5-40 SYRINGE (ML) INJECTION PRN
Status: DISCONTINUED | OUTPATIENT
Start: 2025-04-25 | End: 2025-04-28 | Stop reason: HOSPADM

## 2025-04-25 RX ORDER — SODIUM CHLORIDE 0.9 % (FLUSH) 0.9 %
5-40 SYRINGE (ML) INJECTION EVERY 12 HOURS SCHEDULED
Status: DISCONTINUED | OUTPATIENT
Start: 2025-04-25 | End: 2025-04-28 | Stop reason: HOSPADM

## 2025-04-25 RX ORDER — AMLODIPINE BESYLATE 5 MG/1
5 TABLET ORAL DAILY
Status: DISCONTINUED | OUTPATIENT
Start: 2025-04-26 | End: 2025-04-28 | Stop reason: HOSPADM

## 2025-04-25 RX ORDER — POLYETHYLENE GLYCOL 3350 17 G/17G
17 POWDER, FOR SOLUTION ORAL DAILY PRN
Status: DISCONTINUED | OUTPATIENT
Start: 2025-04-25 | End: 2025-04-25

## 2025-04-25 RX ORDER — MIRTAZAPINE 15 MG/1
15 TABLET, FILM COATED ORAL NIGHTLY
Status: DISCONTINUED | OUTPATIENT
Start: 2025-04-25 | End: 2025-04-28 | Stop reason: HOSPADM

## 2025-04-25 RX ORDER — IOPAMIDOL 755 MG/ML
100 INJECTION, SOLUTION INTRAVASCULAR
Status: COMPLETED | OUTPATIENT
Start: 2025-04-25 | End: 2025-04-25

## 2025-04-25 RX ADMIN — MIRTAZAPINE 15 MG: 15 TABLET, FILM COATED ORAL at 23:18

## 2025-04-25 RX ADMIN — WATER 1000 MG: 1 INJECTION INTRAMUSCULAR; INTRAVENOUS; SUBCUTANEOUS at 22:41

## 2025-04-25 RX ADMIN — SODIUM CHLORIDE, PRESERVATIVE FREE 10 ML: 5 INJECTION INTRAVENOUS at 22:42

## 2025-04-25 RX ADMIN — TAMSULOSIN HYDROCHLORIDE 0.4 MG: 0.4 CAPSULE ORAL at 23:23

## 2025-04-25 RX ADMIN — APIXABAN 5 MG: 5 TABLET, FILM COATED ORAL at 23:17

## 2025-04-25 RX ADMIN — ATORVASTATIN CALCIUM 10 MG: 20 TABLET, FILM COATED ORAL at 23:19

## 2025-04-25 RX ADMIN — IOPAMIDOL 100 ML: 755 INJECTION, SOLUTION INTRAVENOUS at 19:58

## 2025-04-25 ASSESSMENT — PAIN SCALES - GENERAL: PAINLEVEL_OUTOF10: 0

## 2025-04-25 ASSESSMENT — PAIN - FUNCTIONAL ASSESSMENT: PAIN_FUNCTIONAL_ASSESSMENT: 0-10

## 2025-04-25 NOTE — ED TRIAGE NOTES
Pt to ED [facility] c/o unconcious episode around 5pm for approx 20 mins. When EMS arrived staff reported pt was back to baseline. Staff also report blood in pt's urine bag starting today and has had reoccurring UTI's.    Hx stroke 1 year ago w r side deficit and aphasia. On apixaban for Afib.

## 2025-04-25 NOTE — ED PROVIDER NOTES
and electrolytes within normal limits.  UA does show large blood with 1+ bacteria, will send urine for culture and cover with antibiotics but likely chronic colonization given his indwelling Sun catheter which was replaced today.  CT abdomen pelvis shows multiple right nonobstructing intrarenal calculi with a new distal right ureter calculus without obvious associated hydronephrosis or hydroureter.  At this time he does not appear septic from the ureterolithiasis despite bacteria in the urine therefore low suspicion for infected ureteral stone as he is afebrile, no leukocytosis.    Given episode of unresponsiveness, concern for seizure versus arrhythmia.  Will admit for monitoring for his episode of unresponsiveness.      Amount and/or Complexity of Data Reviewed  Labs: ordered.  Radiology: ordered.  ECG/medicine tests: ordered.    Risk  Prescription drug management.  Decision regarding hospitalization.        CONSULTS:  None    PROCEDURES:  Unless otherwise noted below, none     Procedures    REASSESSMENT     Perfect Serve Consult for Admission  10:19 PM    ED Room Number: ER01/01  Patient Name and age:  Taj Clemens 64 y.o.  male  Working Diagnosis:   1. Syncope, unspecified syncope type    2. Urinary tract infection associated with catheterization of urinary tract, unspecified indwelling urinary catheter type, initial encounter    3. Hematuria, unspecified type    4. Ureterolithiasis        COVID-19 Suspicion: No  Sepsis present:  No  Reassessment needed: No  Code Status:  Full Code  Readmission: No  Isolation Requirements: no  Recommended Level of Care: telemetry  Department: [facility] - [phone]    Other:  64 y.o. male with a history of hypertension, CVA with aphasia and right hemiparesis, neurogenic bladder with chronic indwelling Sun catheter, atrial fibrillation presented from SNF for reported syncope. Reported unresponsive episode for about 20 minutes, on EMS arrival back to baseline.

## 2025-04-26 LAB
COMMENT:: NORMAL
SPECIMEN HOLD: NORMAL

## 2025-04-26 PROCEDURE — 6370000000 HC RX 637 (ALT 250 FOR IP): Performed by: STUDENT IN AN ORGANIZED HEALTH CARE EDUCATION/TRAINING PROGRAM

## 2025-04-26 PROCEDURE — 96375 TX/PRO/DX INJ NEW DRUG ADDON: CPT

## 2025-04-26 PROCEDURE — G0378 HOSPITAL OBSERVATION PER HR: HCPCS

## 2025-04-26 PROCEDURE — 2500000003 HC RX 250 WO HCPCS: Performed by: STUDENT IN AN ORGANIZED HEALTH CARE EDUCATION/TRAINING PROGRAM

## 2025-04-26 PROCEDURE — 99215 OFFICE O/P EST HI 40 MIN: CPT | Performed by: PSYCHIATRY & NEUROLOGY

## 2025-04-26 PROCEDURE — 6360000002 HC RX W HCPCS: Performed by: STUDENT IN AN ORGANIZED HEALTH CARE EDUCATION/TRAINING PROGRAM

## 2025-04-26 PROCEDURE — 97165 OT EVAL LOW COMPLEX 30 MIN: CPT

## 2025-04-26 PROCEDURE — 94761 N-INVAS EAR/PLS OXIMETRY MLT: CPT

## 2025-04-26 PROCEDURE — 97163 PT EVAL HIGH COMPLEX 45 MIN: CPT

## 2025-04-26 RX ORDER — KETOROLAC TROMETHAMINE 30 MG/ML
15 INJECTION, SOLUTION INTRAMUSCULAR; INTRAVENOUS EVERY 6 HOURS PRN
Status: DISCONTINUED | OUTPATIENT
Start: 2025-04-26 | End: 2025-04-28 | Stop reason: HOSPADM

## 2025-04-26 RX ORDER — OXYCODONE HYDROCHLORIDE 5 MG/1
5 TABLET ORAL EVERY 4 HOURS PRN
Status: DISCONTINUED | OUTPATIENT
Start: 2025-04-26 | End: 2025-04-28 | Stop reason: HOSPADM

## 2025-04-26 RX ORDER — ACETAMINOPHEN 325 MG/1
650 TABLET ORAL EVERY 6 HOURS
Status: DISCONTINUED | OUTPATIENT
Start: 2025-04-26 | End: 2025-04-28 | Stop reason: HOSPADM

## 2025-04-26 RX ADMIN — SODIUM CHLORIDE, PRESERVATIVE FREE 5 ML: 5 INJECTION INTRAVENOUS at 20:16

## 2025-04-26 RX ADMIN — CARVEDILOL 12.5 MG: 12.5 TABLET, FILM COATED ORAL at 17:34

## 2025-04-26 RX ADMIN — APIXABAN 5 MG: 5 TABLET, FILM COATED ORAL at 20:14

## 2025-04-26 RX ADMIN — APIXABAN 5 MG: 5 TABLET, FILM COATED ORAL at 09:42

## 2025-04-26 RX ADMIN — MIRTAZAPINE 15 MG: 15 TABLET, FILM COATED ORAL at 20:14

## 2025-04-26 RX ADMIN — KETOROLAC TROMETHAMINE 15 MG: 30 INJECTION, SOLUTION INTRAMUSCULAR at 19:36

## 2025-04-26 RX ADMIN — ACETAMINOPHEN 650 MG: 325 TABLET ORAL at 11:36

## 2025-04-26 RX ADMIN — ACETAMINOPHEN 650 MG: 325 TABLET ORAL at 17:34

## 2025-04-26 RX ADMIN — AMLODIPINE BESYLATE 5 MG: 5 TABLET ORAL at 09:42

## 2025-04-26 RX ADMIN — SODIUM CHLORIDE, PRESERVATIVE FREE 10 ML: 5 INJECTION INTRAVENOUS at 09:52

## 2025-04-26 RX ADMIN — ACETAMINOPHEN 650 MG: 325 TABLET ORAL at 06:04

## 2025-04-26 RX ADMIN — CEPHALEXIN 500 MG: 250 CAPSULE ORAL at 21:35

## 2025-04-26 RX ADMIN — FINASTERIDE 5 MG: 5 TABLET, FILM COATED ORAL at 09:42

## 2025-04-26 RX ADMIN — TAMSULOSIN HYDROCHLORIDE 0.4 MG: 0.4 CAPSULE ORAL at 20:14

## 2025-04-26 RX ADMIN — ASPIRIN 81 MG: 81 TABLET, CHEWABLE ORAL at 09:42

## 2025-04-26 RX ADMIN — CEPHALEXIN 500 MG: 250 CAPSULE ORAL at 13:48

## 2025-04-26 RX ADMIN — ATORVASTATIN CALCIUM 10 MG: 20 TABLET, FILM COATED ORAL at 20:14

## 2025-04-26 RX ADMIN — CARVEDILOL 12.5 MG: 12.5 TABLET, FILM COATED ORAL at 09:42

## 2025-04-26 ASSESSMENT — PAIN DESCRIPTION - LOCATION: LOCATION: BACK

## 2025-04-26 ASSESSMENT — PAIN SCALES - GENERAL
PAINLEVEL_OUTOF10: 0
PAINLEVEL_OUTOF10: 8

## 2025-04-26 NOTE — CONSULTS
5 mg, Oral, BID, Bruce Velasco MD, 5 mg at 04/26/25 0942    aspirin chewable tablet 81 mg, 81 mg, Oral, Daily, Bruce Velasco MD, 81 mg at 04/26/25 0942    atorvastatin (LIPITOR) tablet 10 mg, 10 mg, Oral, Nightly, Bruce Velasco MD, 10 mg at 04/25/25 2319    carvedilol (COREG) tablet 12.5 mg, 12.5 mg, Oral, BID with meals, Bruce Velasco MD, 12.5 mg at 04/26/25 1734    finasteride (PROSCAR) tablet 5 mg, 5 mg, Oral, Daily, Bruce Velasco MD, 5 mg at 04/26/25 0942    mirtazapine (REMERON) tablet 15 mg, 15 mg, Oral, Nightly, Bruce Velasco MD, 15 mg at 04/25/25 2318    tamsulosin (FLOMAX) capsule 0.4 mg, 0.4 mg, Oral, QHS, Bruce Velasco MD, 0.4 mg at 04/25/25 2323    =====================================    MEDICAL DECISION MAKING (2 of 3: A +/- B +/- C)    A) Number and Complexity of Problem(s) Addressed:  [] 1 stable, acute illness (Low)  [] 1 stable, chronic illness (Low)  [] 1 acute illness with systemic symptoms (Moderate)  [] 1 undiagnosed new problem with uncertain prognosis (Moderate)  [] 1 or more chronic illness with exacerbation, progression, or side effects of treatment (Moderate)  [] 1 or more chronic illnesses with severe exacerbation, progression, or side effects of treatment (High)  [x] 1 acute or chronic illness or injury that poses a threat to life or bodily function (High)    B) Amount/ Complexity of Data reviewed (1 of 3 categories = Moderate, 2 of 3 categories = High)    Test, documents, or independent historian(s) (any THREE ELEMENTS):   []  Review of external note(s) from unique source:  [x]  Review of the result(s) of each unique test  (3+)   [x]  Ordered a unique test MRI Brain w/o contrast, EEG  [x]  Discussed with a historian other than the patient: Raina    Independent interpretation of a test performed by another Physician / QHP:     [] Yes      Discussion of management or test interpretation with another Physician / QHP:     [x] Yes  Discussed with Dr Silva via [employer] (secure messaging)       C) Risk

## 2025-04-26 NOTE — ED NOTES
Pate POA removed and new pate catheter placed with 2nd RN present. Patient tolerate procedure well. Yellow urine return present.

## 2025-04-26 NOTE — PLAN OF CARE
Problem: Pain  Goal: Verbalizes/displays adequate comfort level or baseline comfort level  Outcome: Progressing     Problem: Chronic Conditions and Co-morbidities  Goal: Patient's chronic conditions and co-morbidity symptoms are monitored and maintained or improved  Outcome: Progressing     Problem: Skin/Tissue Integrity  Goal: Skin integrity remains intact  Description: 1.  Monitor for areas of redness and/or skin breakdown2.  Assess vascular access sites hourly3.  Every 4-6 hours minimum:  Change oxygen saturation probe site4.  Every 4-6 hours:  If on nasal continuous positive airway pressure, respiratory therapy assess nares and determine need for appliance change or resting period  Outcome: Progressing     Problem: ABCDS Injury Assessment  Goal: Absence of physical injury  Outcome: Progressing     Problem: Safety - Adult  Goal: Free from fall injury  Outcome: Progressing     Problem: Cardiovascular - Adult  Goal: Maintains optimal cardiac output and hemodynamic stability  Outcome: Progressing     Problem: Skin/Tissue Integrity - Adult  Goal: Skin integrity remains intact  Description: 1.  Monitor for areas of redness and/or skin breakdown2.  Assess vascular access sites hourly3.  Every 4-6 hours minimum:  Change oxygen saturation probe site4.  Every 4-6 hours:  If on nasal continuous positive airway pressure, respiratory therapy assess nares and determine need for appliance change or resting period  Outcome: Progressing  Goal: Incisions, wounds, or drain sites healing without S/S of infection  Outcome: Progressing     Problem: Musculoskeletal - Adult  Goal: Maintain proper alignment of affected body part  Outcome: Progressing     Problem: Genitourinary - Adult  Goal: Absence of urinary retention  Outcome: Progressing  Goal: Urinary catheter remains patent  Outcome: Progressing     Problem: Infection - Adult  Goal: Absence of infection at discharge  Outcome: Progressing  Goal: Absence of infection during

## 2025-04-26 NOTE — H&P
Hospitalist Admission Note    NAME:  Taj Clemens   :  1960   MRN:  [id number]     Date/Time:  2025 10:38 PM    Patient PCP: Lalita, Pcp  ________________________________________________________________________    Given the patient's current clinical presentation, I have a high level of concern for decompensation if discharged from the emergency department.  Complex decision making was performed, which includes reviewing the patient's available past medical records, laboratory results, and x-ray films.       My assessment of this patient's clinical condition and my plan of care is as follows.    Assessment / Plan:    Taj is a 64 y.o.  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  Patient presented from SNF for syncope.    Syncope: Unclear etiology.  Vasovagal vs neurogenic vs cardiogenic.  CT head showed no acute finding.  Noted chronic left MCA territory infarct on the CT head.  EKG showed sinus bradycardia, HR 54.  His last echocardiogram showed preserved EF.  Will get neurology consult to see if patient need MRI or EEG.    Abnormal UA: May be related to colonization due to chronic Sun catheter but cannot rule out UTI.  Will send urine culture.  Continue IV ceftriaxone.    Ureteral calculi, hematuria: No hydronephrosis or hydroureter on the CT.  Will consult urology given hematuria, ureteral calculi, concern for UTI and chronic Sun.    Neurogenic bladder due to CVA, Chronic indwelling Sun: Catheter is changed in the ED.    A-fib: Continue amiodarone, Coreg, Eliquis    Hypertension: Continue home meds.    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  Continue aspirin, statin.  Consult PT, OT.    Depression: Continue Remeron.          I have personally reviewed the radiographs, laboratory data in Epic and decisions and statements above are based partially on

## 2025-04-27 ENCOUNTER — APPOINTMENT (OUTPATIENT)
Facility: HOSPITAL | Age: 65
DRG: 699 | End: 2025-04-27
Payer: MEDICARE

## 2025-04-27 PROBLEM — R40.4 UNRESPONSIVE EPISODE: Status: ACTIVE | Noted: 2025-04-27

## 2025-04-27 PROCEDURE — 94761 N-INVAS EAR/PLS OXIMETRY MLT: CPT

## 2025-04-27 PROCEDURE — 2500000003 HC RX 250 WO HCPCS: Performed by: STUDENT IN AN ORGANIZED HEALTH CARE EDUCATION/TRAINING PROGRAM

## 2025-04-27 PROCEDURE — 6370000000 HC RX 637 (ALT 250 FOR IP): Performed by: STUDENT IN AN ORGANIZED HEALTH CARE EDUCATION/TRAINING PROGRAM

## 2025-04-27 PROCEDURE — 6370000000 HC RX 637 (ALT 250 FOR IP): Performed by: FAMILY MEDICINE

## 2025-04-27 PROCEDURE — G0378 HOSPITAL OBSERVATION PER HR: HCPCS

## 2025-04-27 PROCEDURE — 96376 TX/PRO/DX INJ SAME DRUG ADON: CPT

## 2025-04-27 PROCEDURE — 99232 SBSQ HOSP IP/OBS MODERATE 35: CPT | Performed by: PSYCHIATRY & NEUROLOGY

## 2025-04-27 PROCEDURE — 95813 EEG EXTND MNTR 61-119 MIN: CPT | Performed by: PSYCHIATRY & NEUROLOGY

## 2025-04-27 PROCEDURE — XX20X89 MONITORING OF BRAIN ELECTRICAL ACTIVITY, COMPUTER-AIDED DETECTION AND NOTIFICATION, NEW TECHNOLOGY GROUP 9: ICD-10-PCS | Performed by: PSYCHIATRY & NEUROLOGY

## 2025-04-27 PROCEDURE — 70551 MRI BRAIN STEM W/O DYE: CPT

## 2025-04-27 PROCEDURE — 6360000002 HC RX W HCPCS: Performed by: STUDENT IN AN ORGANIZED HEALTH CARE EDUCATION/TRAINING PROGRAM

## 2025-04-27 RX ORDER — LEVOFLOXACIN 750 MG/1
750 TABLET, FILM COATED ORAL EVERY 24 HOURS
Status: DISCONTINUED | OUTPATIENT
Start: 2025-04-27 | End: 2025-04-28 | Stop reason: HOSPADM

## 2025-04-27 RX ORDER — CETIRIZINE HYDROCHLORIDE 10 MG/1
10 TABLET ORAL NIGHTLY
Status: DISCONTINUED | OUTPATIENT
Start: 2025-04-27 | End: 2025-04-28 | Stop reason: HOSPADM

## 2025-04-27 RX ADMIN — ATORVASTATIN CALCIUM 10 MG: 20 TABLET, FILM COATED ORAL at 21:38

## 2025-04-27 RX ADMIN — APIXABAN 5 MG: 5 TABLET, FILM COATED ORAL at 21:38

## 2025-04-27 RX ADMIN — CEPHALEXIN 500 MG: 250 CAPSULE ORAL at 06:31

## 2025-04-27 RX ADMIN — ACETAMINOPHEN 650 MG: 325 TABLET ORAL at 06:31

## 2025-04-27 RX ADMIN — ACETAMINOPHEN 650 MG: 325 TABLET ORAL at 11:55

## 2025-04-27 RX ADMIN — AMLODIPINE BESYLATE 5 MG: 5 TABLET ORAL at 09:04

## 2025-04-27 RX ADMIN — APIXABAN 5 MG: 5 TABLET, FILM COATED ORAL at 09:04

## 2025-04-27 RX ADMIN — ASPIRIN 81 MG: 81 TABLET, CHEWABLE ORAL at 09:04

## 2025-04-27 RX ADMIN — MIRTAZAPINE 15 MG: 15 TABLET, FILM COATED ORAL at 21:38

## 2025-04-27 RX ADMIN — CETIRIZINE HYDROCHLORIDE 10 MG: 10 TABLET, FILM COATED ORAL at 21:39

## 2025-04-27 RX ADMIN — CARVEDILOL 12.5 MG: 12.5 TABLET, FILM COATED ORAL at 17:32

## 2025-04-27 RX ADMIN — CARVEDILOL 12.5 MG: 12.5 TABLET, FILM COATED ORAL at 09:04

## 2025-04-27 RX ADMIN — TAMSULOSIN HYDROCHLORIDE 0.4 MG: 0.4 CAPSULE ORAL at 21:38

## 2025-04-27 RX ADMIN — LEVOFLOXACIN 750 MG: 750 TABLET, FILM COATED ORAL at 14:10

## 2025-04-27 RX ADMIN — ACETAMINOPHEN 650 MG: 325 TABLET ORAL at 00:14

## 2025-04-27 RX ADMIN — FINASTERIDE 5 MG: 5 TABLET, FILM COATED ORAL at 09:04

## 2025-04-27 RX ADMIN — KETOROLAC TROMETHAMINE 15 MG: 30 INJECTION, SOLUTION INTRAMUSCULAR at 09:04

## 2025-04-27 RX ADMIN — SODIUM CHLORIDE, PRESERVATIVE FREE 10 ML: 5 INJECTION INTRAVENOUS at 09:11

## 2025-04-27 RX ADMIN — ACETAMINOPHEN 650 MG: 325 TABLET ORAL at 17:32

## 2025-04-27 NOTE — CARE COORDINATION
Care Management Initial Assessment  4/27/2025 3:26 PM  If patient is discharged prior to next notation, then this note serves as note for discharge by case management.    Reason for Admission:   Syncope and collapse [R55]  Ureterolithiasis [N20.1]  Syncope, unspecified syncope type [R55]  Urinary tract infection associated with catheterization of urinary tract, unspecified indwelling urinary catheter type, initial encounter [T83.511A, N39.0]  Hematuria, unspecified type [R31.9]         Patient Admission Status: Observation  Date Admitted to INP: 4/25/25  RUR: Readmission Risk Score: 22.6    Hospitalization in the last 30 days (Readmission):  No        Advance Care Planning:  Code Status: Full Code  Primary Healthcare Decision Maker:     Advance Directive: has NO advanced directive - not interested in additional information     __________________________________________________________________________  Assessment:   Transition of care  RUR n/a    Urology consult  Wound consult  PT/OT consult  Telemetry  Neuro consult  SLP consult    Met with patient/significant other at bedside.  Significant other answered all questions asked due to aphasia.    Patient resides at [facility] and has 24/7 care/support from staff.   Patient is total care and is transferred via Vane lift by staff.    Plan is to return to LTC once medically ready.    [facility] is preferred and used provider- BLS stretcher    Case management to follow to assist with discharge planning.  JW          Comments:     Discharge Concerns: []Yes [x]No []Unknown   Describe:    Financial concerns/barriers: []Yes, explain: [x]No []Unknown/Not discussed  __________________________________________________________________________    Insurer:   Active Insurance as of 4/25/2025       Primary Coverage       Payor Plan Insurance Group Employer/Plan Group    MEDICARE MEDICARE PART A AND B        Payor Address Payor Phone Number Payor Fax Number Effective Dates    [address]

## 2025-04-27 NOTE — PROCEDURES
[facility]/ Rapid EEG    [place], [place]        [phone] (Main)  [phone] (Medical Records)     Interpreting Physician:  Gerhard Rangel MD    Date/ Time:     4-26-25 ( [id number] to 2008 )  Total Duration:    1 hr 35 min  Date of Interpretation:  4/27/25    Indication:  64 y.o. male   Syncope and collapse [R55]  Ureterolithiasis [N20.1]  Syncope, unspecified syncope type [R55]  Urinary tract infection associated with catheterization of urinary tract, unspecified indwelling urinary catheter type, initial encounter [T83.511A, N39.0]  Hematuria, unspecified type [R31.9]    Impression:      Normal awake and drowsy rapid EEG/ [facility] recording.  No epileptiform discharges or electrographic seizures were seen during this recording. Clinical and Neuro-Imaging correlation is necessary.      If there is any further concern for potential seizure-like activity, I recommend obtaining a full, 16-channel EEG with video (routine 20-30 minute EEG or prolonged EEG) to include parasagittal coverage and improved recording quality.      =================================  Technical: This EEG was obtained using a 10 lead, 8 channel system positioned circumferentially without parasagittal coverage. Computer selected EEG is reviewed as well as background features and all clinically significant events. Clarity algorithm utilized and implemented to provide analysis of underlying activity and seizure detection used to facilitate reading.      PSHx lists History of Cranial Surgery: No    Past Surgical History:   Procedure Laterality Date    HEMORRHOID SURGERY  1993    IR MECHANICAL ART THROMBECTOMY INTRACRANIAL  3/24/2024    IR MECHANICAL ART THROMBECTOMY INTRACRANIAL 3/24/2024 [facility] RAD ANGIO IR       Interpretation:  Background symmetric low in amplitude.  Posterior dominant rhythm is 8 Hz on both sides.  Drowsiness is noted as the background frequencies drop into the theta range.  Sleep is not recorded.  No

## 2025-04-28 ENCOUNTER — TELEPHONE (OUTPATIENT)
Age: 65
End: 2025-04-28

## 2025-04-28 VITALS
OXYGEN SATURATION: 97 % | DIASTOLIC BLOOD PRESSURE: 97 MMHG | WEIGHT: 203.26 LBS | RESPIRATION RATE: 16 BRPM | TEMPERATURE: 98.2 F | HEIGHT: 72 IN | HEART RATE: 62 BPM | SYSTOLIC BLOOD PRESSURE: 152 MMHG | BODY MASS INDEX: 27.53 KG/M2

## 2025-04-28 LAB
ANION GAP SERPL CALC-SCNC: 5 MMOL/L (ref 2–12)
BACTERIA SPEC CULT: ABNORMAL
BUN SERPL-MCNC: 11 MG/DL (ref 6–20)
BUN/CREAT SERPL: 12 (ref 12–20)
CALCIUM SERPL-MCNC: 9.1 MG/DL (ref 8.5–10.1)
CC UR VC: ABNORMAL
CHLORIDE SERPL-SCNC: 108 MMOL/L (ref 97–108)
CO2 SERPL-SCNC: 27 MMOL/L (ref 21–32)
CREAT SERPL-MCNC: 0.93 MG/DL (ref 0.7–1.3)
EKG ATRIAL RATE: 54 BPM
EKG DIAGNOSIS: NORMAL
EKG P AXIS: 27 DEGREES
EKG P-R INTERVAL: 162 MS
EKG Q-T INTERVAL: 458 MS
EKG QRS DURATION: 86 MS
EKG QTC CALCULATION (BAZETT): 434 MS
EKG R AXIS: -8 DEGREES
EKG T AXIS: 10 DEGREES
EKG VENTRICULAR RATE: 54 BPM
ERYTHROCYTE [DISTWIDTH] IN BLOOD BY AUTOMATED COUNT: 14.7 % (ref 11.5–14.5)
GLUCOSE SERPL-MCNC: 87 MG/DL (ref 65–100)
HCT VFR BLD AUTO: 33.6 % (ref 36.6–50.3)
HGB BLD-MCNC: 11.2 G/DL (ref 12.1–17)
MCH RBC QN AUTO: 31.5 PG (ref 26–34)
MCHC RBC AUTO-ENTMCNC: 33.3 G/DL (ref 30–36.5)
MCV RBC AUTO: 94.6 FL (ref 80–99)
NRBC # BLD: 0 K/UL (ref 0–0.01)
NRBC BLD-RTO: 0 PER 100 WBC
PLATELET # BLD AUTO: 287 K/UL (ref 150–400)
PMV BLD AUTO: 9.7 FL (ref 8.9–12.9)
POTASSIUM SERPL-SCNC: 3.3 MMOL/L (ref 3.5–5.1)
RBC # BLD AUTO: 3.55 M/UL (ref 4.1–5.7)
SERVICE CMNT-IMP: ABNORMAL
SODIUM SERPL-SCNC: 140 MMOL/L (ref 136–145)
WBC # BLD AUTO: 4.5 K/UL (ref 4.1–11.1)

## 2025-04-28 PROCEDURE — 85027 COMPLETE CBC AUTOMATED: CPT

## 2025-04-28 PROCEDURE — 96375 TX/PRO/DX INJ NEW DRUG ADDON: CPT

## 2025-04-28 PROCEDURE — 6360000002 HC RX W HCPCS: Performed by: FAMILY MEDICINE

## 2025-04-28 PROCEDURE — 94761 N-INVAS EAR/PLS OXIMETRY MLT: CPT

## 2025-04-28 PROCEDURE — 2580000003 HC RX 258: Performed by: STUDENT IN AN ORGANIZED HEALTH CARE EDUCATION/TRAINING PROGRAM

## 2025-04-28 PROCEDURE — 6370000000 HC RX 637 (ALT 250 FOR IP): Performed by: STUDENT IN AN ORGANIZED HEALTH CARE EDUCATION/TRAINING PROGRAM

## 2025-04-28 PROCEDURE — 6360000002 HC RX W HCPCS: Performed by: STUDENT IN AN ORGANIZED HEALTH CARE EDUCATION/TRAINING PROGRAM

## 2025-04-28 PROCEDURE — 1100000000 HC RM PRIVATE

## 2025-04-28 PROCEDURE — G0378 HOSPITAL OBSERVATION PER HR: HCPCS

## 2025-04-28 PROCEDURE — 2500000003 HC RX 250 WO HCPCS: Performed by: STUDENT IN AN ORGANIZED HEALTH CARE EDUCATION/TRAINING PROGRAM

## 2025-04-28 PROCEDURE — 93010 ELECTROCARDIOGRAM REPORT: CPT | Performed by: INTERNAL MEDICINE

## 2025-04-28 PROCEDURE — 05HF33Z INSERTION OF INFUSION DEVICE INTO LEFT CEPHALIC VEIN, PERCUTANEOUS APPROACH: ICD-10-PCS | Performed by: STUDENT IN AN ORGANIZED HEALTH CARE EDUCATION/TRAINING PROGRAM

## 2025-04-28 PROCEDURE — 80048 BASIC METABOLIC PNL TOTAL CA: CPT

## 2025-04-28 PROCEDURE — 36415 COLL VENOUS BLD VENIPUNCTURE: CPT

## 2025-04-28 RX ORDER — LEVOFLOXACIN 750 MG/1
750 TABLET, FILM COATED ORAL EVERY 24 HOURS
Qty: 8 TABLET | Refills: 0 | Status: SHIPPED | OUTPATIENT
Start: 2025-04-29 | End: 2025-05-07

## 2025-04-28 RX ORDER — HYDRALAZINE HYDROCHLORIDE 20 MG/ML
10 INJECTION INTRAMUSCULAR; INTRAVENOUS ONCE
Status: COMPLETED | OUTPATIENT
Start: 2025-04-28 | End: 2025-04-28

## 2025-04-28 RX ORDER — SODIUM CHLORIDE, SODIUM LACTATE, POTASSIUM CHLORIDE, AND CALCIUM CHLORIDE .6; .31; .03; .02 G/100ML; G/100ML; G/100ML; G/100ML
500 INJECTION, SOLUTION INTRAVENOUS ONCE
Status: COMPLETED | OUTPATIENT
Start: 2025-04-28 | End: 2025-04-28

## 2025-04-28 RX ADMIN — CARVEDILOL 12.5 MG: 12.5 TABLET, FILM COATED ORAL at 09:05

## 2025-04-28 RX ADMIN — ASPIRIN 81 MG: 81 TABLET, CHEWABLE ORAL at 09:05

## 2025-04-28 RX ADMIN — AMLODIPINE BESYLATE 5 MG: 5 TABLET ORAL at 09:05

## 2025-04-28 RX ADMIN — KETOROLAC TROMETHAMINE 15 MG: 30 INJECTION, SOLUTION INTRAMUSCULAR at 14:52

## 2025-04-28 RX ADMIN — APIXABAN 5 MG: 5 TABLET, FILM COATED ORAL at 09:05

## 2025-04-28 RX ADMIN — AMIODARONE HYDROCHLORIDE 200 MG: 200 TABLET ORAL at 09:05

## 2025-04-28 RX ADMIN — SODIUM CHLORIDE, SODIUM LACTATE, POTASSIUM CHLORIDE, AND CALCIUM CHLORIDE 500 ML: .6; .31; .03; .02 INJECTION, SOLUTION INTRAVENOUS at 09:11

## 2025-04-28 RX ADMIN — LEVOFLOXACIN 750 MG: 750 TABLET, FILM COATED ORAL at 13:31

## 2025-04-28 RX ADMIN — SODIUM CHLORIDE, PRESERVATIVE FREE 10 ML: 5 INJECTION INTRAVENOUS at 09:11

## 2025-04-28 RX ADMIN — CARVEDILOL 12.5 MG: 12.5 TABLET, FILM COATED ORAL at 18:12

## 2025-04-28 RX ADMIN — FINASTERIDE 5 MG: 5 TABLET, FILM COATED ORAL at 09:05

## 2025-04-28 RX ADMIN — ACETAMINOPHEN 650 MG: 325 TABLET ORAL at 06:16

## 2025-04-28 RX ADMIN — ACETAMINOPHEN 650 MG: 325 TABLET ORAL at 12:11

## 2025-04-28 RX ADMIN — ACETAMINOPHEN 650 MG: 325 TABLET ORAL at 00:23

## 2025-04-28 RX ADMIN — HYDRALAZINE HYDROCHLORIDE 10 MG: 20 INJECTION INTRAMUSCULAR; INTRAVENOUS at 01:32

## 2025-04-28 RX ADMIN — ACETAMINOPHEN 650 MG: 325 TABLET ORAL at 18:11

## 2025-04-28 ASSESSMENT — PAIN SCALES - GENERAL
PAINLEVEL_OUTOF10: 2
PAINLEVEL_OUTOF10: 5
PAINLEVEL_OUTOF10: 5
PAINLEVEL_OUTOF10: 9

## 2025-04-28 ASSESSMENT — PAIN DESCRIPTION - ORIENTATION: ORIENTATION: LOWER

## 2025-04-28 ASSESSMENT — PAIN DESCRIPTION - DESCRIPTORS
DESCRIPTORS: ACHING
DESCRIPTORS: ACHING;SORE

## 2025-04-28 ASSESSMENT — PAIN DESCRIPTION - LOCATION
LOCATION: GENERALIZED
LOCATION: ABDOMEN

## 2025-04-28 NOTE — PROGRESS NOTES
Urology Progress Note    Patient: Taj Clemens MRN: [id number]  SSN: [id number]    YOB: 1960  Age: 64 y.o.  Sex: male            Assessment:     63 y/o male with hx of kidney stones, hematuria. Known to [facility], scheduled for R CRULS on 4/29/25 at [facility].  Admitted for AMS.  Urology consulted for 6mm R ureteral stone w/o obstruction.     4/27/25  Denies pain on assessment, FC in place draining clear yellow urine with sediment  VSS, afebrile  No leukocytosis, cr- 0.93  Ucx + possible enterococcus species    Plan:     6 mm R UVJ stone  UTI  Non-toxic, pain controlled  - no plans for operative intervention today  - continue culture driven ABX, symptom management per primary team. if pain continues to be managed and remains non-toxic. Patient can continue with OP CRULS.     Will see in am          Objective:     /88   Pulse 57   Temp 97.3 °F (36.3 °C) (Oral)   Resp 16   Ht 1.829 m (6')   Wt 92.2 kg (203 lb 4.2 oz)   SpO2 98%   BMI 27.57 kg/m²       Intake/Output Summary (Last 24 hours) at 4/27/2025 0803  Last data filed at 4/27/2025 0414  Gross per 24 hour   Intake 200 ml   Output 1250 ml   Net -1050 ml       Physical Exam  General: NAD  HEENT: NC/AT, EOMI, MMM  Abdomen: soft, NTTP, nondistended, no suprapubic fullness or tenderness  : FC in place draining clear yellow urine with sediment, no CVA tenderness  Neuro: Appropriate, no focal neurological deficits  Mood/Affect: normal    No results found for this or any previous visit (from the past 24 hours).    Imaging:  CT ABDOMEN PELVIS W IV CONTRAST Additional Contrast? None   Final Result   Multiple right kidney nonobstructing intrarenal calculi with new distal right   ureteric calculus as detailed. No obvious associated hydronephrosis or   hydroureter.      Sun decompressed urinary bladder precludes optimal evaluation.   Diffuse bladder wall thickening with tiny pneumouria nonspecific 
                                           Urology Progress Note    Patient: Taj Clemens MRN: [id number]  SSN: [id number]    YOB: 1960  Age: 64 y.o.  Sex: male        Assessment:     No leukocytosis   Cr WNL   Pate with davidson urine   LLQ abd pain with palpation. Distal stone on CT is right sided.    Plan:     6mm distal right ureteral stone, additional right renal stones, UTI   -Plan for CRULS tomorrow at [facility]  -OK for DC on levaquin while awaiting final sensitivities for culture    Case reviewed with his urologist/surgeon for tomorrow    ROS:     Limited given aphasia  LLQ abdominal pain     Objective:     BP (!) 141/97   Pulse 57   Temp 97.3 °F (36.3 °C) (Oral)   Resp 16   Ht 1.829 m (6')   Wt 92.2 kg (203 lb 4.2 oz)   SpO2 98%   BMI 27.57 kg/m²       Intake/Output Summary (Last 24 hours) at 4/28/2025 1316  Last data filed at 4/28/2025 1257  Gross per 24 hour   Intake 0 ml   Output 500 ml   Net -500 ml       Physical Exam  General: NAD  Respiratory: no distress, room air  Abdomen: soft, no distention, LLQ tenderness   : no CVA tenderness, pate with davidson urine   Neuro: aphasia     Labs reviewed, April 28, 2025  No results found for this or any previous visit (from the past 24 hours).    Imaging:  MRI BRAIN WO CONTRAST  Result Date: 4/27/2025  EXAM: MRI BRAIN WO CONTRAST INDICATION: Transient alteration of awareness COMPARISON: MRI brain 3/24/2024, CT head 4/25/2025. CONTRAST: None. TECHNIQUE:  Multiplanar multisequence acquisition without contrast of the brain. FINDINGS: The ventricles are normal in size and position. Chronic left MCA territory infarct involving the left frontal and parietal lobes, basal ganglia, insula, and temporal lobe, with associated ex vacuo dilation of the left lateral ventricle, and associated multifocal areas of chronic hemosiderin staining, greatest in the left basal ganglia and perisylvian region. There is associated early left Wallerian degeneration as 
63 y/o male with hx of kidney stones, hematuria. Known to [facility], scheduled for R CRULS on 4/29/25 at [facility].  Admitted for AMS.  Urology consulted for 6mm R ureteral stone w/o obstruction. Pain currently moderately managed, no concern for systemic infection at present. FC in place with brownish red urine, patent, no clots noted. Per patient's wife who is at bedside hematuria has been present for last 3 weeks.     VSS, afebrile  No leukocytosis, cr- 0.93  UA questionable for UTI, Ucx pending   CT a/p as above     ROS:  - hematuria. R flank pain     Gen: nad   Cardio: no distress, HD stable  Res: even and unlabored   GI: soft, non-tender   : FC  in place draining brownish, red urine, no flank pain on assessment  MSK: R-sided weakness  Skin: no gross abnormalities   Neuro: A&O, aphasia      Plan    - continue empiric ABX, tailor to culture   - continue symptom management, if pain continues to be managed and remains non-toxic, and  patient is cleared by neurology. Patient can continue with OP CRULS.    Will reassess in am   
RRT note:    Placed Ceribell EEG device per MD order    Explained procedure to patient, and family at the bedside allowed time for questions.  Applied Ceribell head band, started recording.    NAZANIN Pham RN  
SLP CONTACT NOTE:    Consult received and appreciated, chart reviewed. Patient seen at bedside with spouse present. Patient admitted for syncope however has had no acute neurological change, no acute change in swallow function and is tolerating a regular diet with no dysphagia, no overt s/sx aspiration or penetration. Patient has had no acute change in communication or cognition to warrant an evaluation at this time. ST will sign off.    Thank you for this referral  NAZANIN Rodrigez.Navarro, CCC-SLP  Speech Language Pathologist    
[facility]JIND[facility] MAL[facility]  [address] [place] [address] [phone]        Hospitalist Progress Note      NAME: Taj Clemens   :  1960  MRM:  [id number]    Date/Time of service: 2025  11:31 AM       Subjective:     Chief Complaint:  Patient was personally seen and examined by me during this time period.  Chart reviewed.  F/up syncope and UTI    C/o some back pain this am once again. Updated fiance at bedside       Objective:       Vitals:       Last 24hrs VS reviewed since prior progress note. Most recent are:    Vitals:    25 0905   BP: (!) 142/97   Pulse:    Resp:    Temp:    SpO2:      SpO2 Readings from Last 6 Encounters:   25 97%   25 99%   25 98%   25 99%   25 96%   24 98%          Intake/Output Summary (Last 24 hours) at 2025 1131  Last data filed at 2025 0050  Gross per 24 hour   Intake --   Output 450 ml   Net -450 ml        Exam:     Physical Exam:    Gen:  Well-developed, well-nourished, in no acute distress  HEENT:  Pink conjunctivae, EOMI, hearing intact to voice, moist mucous membranes  Resp:  No accessory muscle use, clear breath sounds without wheezes rales or rhonchi  Card:  No murmurs, normal S1, S2 without thrills, bruits or peripheral edema  Abd:  Soft, non-tender, non-distended, no palpable organomegaly and no detectable hernias  Musc:  No cyanosis or clubbing  Skin:  No rashes or ulcers, skin turgor is good  Neuro:  aphasia. follows commands appropriately  Psych: fair insight, alert      Medications Reviewed: (see below)    Lab Data Reviewed: (see below)    ______________________________________________________________________    Medications:     Current Facility-Administered Medications   Medication Dose Route Frequency    levoFLOXacin (LEVAQUIN) tablet 750 mg  750 mg Oral Q24H    cetirizine (ZYRTEC) tablet 10 mg  10 mg Oral Nightly    acetaminophen (TYLENOL) tablet 650 mg  650 mg Oral Q6H    
[facility]JIND[facility] MAL[facility]  [address] [place] [address] [phone]        Hospitalist Progress Note      NAME: Taj Clemens   :  1960  MRM:  [id number]    Date/Time of service: 2025  1:06 PM       Subjective:     Chief Complaint:  Patient was personally seen and examined by me during this time period.  Chart reviewed.  F/up syncope and UTI    Some discomfort from pate but otherwise no complaints. Updated fiance at bedside       Objective:       Vitals:       Last 24hrs VS reviewed since prior progress note. Most recent are:    Vitals:    25 1150   BP: 134/89   Pulse: 54   Resp: 15   Temp: 98.1 °F (36.7 °C)   SpO2: 97%     SpO2 Readings from Last 6 Encounters:   25 97%   25 99%   25 98%   25 99%   25 96%   24 98%          Intake/Output Summary (Last 24 hours) at 2025 1306  Last data filed at 2025 1022  Gross per 24 hour   Intake --   Output 600 ml   Net -600 ml        Exam:     Physical Exam:    Gen:  Well-developed, well-nourished, in no acute distress  HEENT:  Pink conjunctivae, EOMI, hearing intact to voice, moist mucous membranes  Resp:  No accessory muscle use, clear breath sounds without wheezes rales or rhonchi  Card:  No murmurs, normal S1, S2 without thrills, bruits or peripheral edema  Abd:  Soft, non-tender, non-distended, no palpable organomegaly and no detectable hernias  Musc:  No cyanosis or clubbing  Skin:  No rashes or ulcers, skin turgor is good  Neuro:  aphasia. follows commands appropriately  Psych: fair insight, alert      Medications Reviewed: (see below)    Lab Data Reviewed: (see below)    ______________________________________________________________________    Medications:     Current Facility-Administered Medications   Medication Dose Route Frequency    cefTRIAXone (ROCEPHIN) 1,000 mg in sterile water 10 mL IV syringe  1,000 mg IntraVENous Q24H    acetaminophen (TYLENOL) tablet 650 mg  650 mg 
Ultrasound IV by  :  Procedure Note    Ultrasound IV education provided to patient. Opportunities for questions given.     Ultrasound used for PIV placement:  20 gauge 5.7 cm AccuCath Ace 5.7cm  left basilic} location.  1 X Attempt(s).    Vigorous blood return present and saline flushes with ease.     Procedure tolerated well. Primary RN aware of IV placement and added to LDA.      Suzette Templeton, RN  
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     Past Surgical History:   Procedure Laterality Date    HEMORRHOID SURGERY  1993    IR MECHANICAL ART THROMBECTOMY INTRACRANIAL  3/24/2024    IR MECHANICAL ART THROMBECTOMY INTRACRANIAL 3/24/2024 [facility] RAD ANGIO IR       Home Situation and Prior Level of Function: total dependent care  Social/Functional History  Type of Home: Facility (LTC at [facility])  Prior Level of Assist for ADLs: Needs assistance  Prior Level of Assist for Transfers: Needs assistance (den lift to w/c)  Critical Behavior:     Cognition  Overall Cognitive Status: Exceptions  Following Commands: Follows one step commands with increased time;Follows one step commands with repetition  Initiation: Requires cues for some  Sequencing: Requires cues for some  Cognition Comment: patient with expressive aphasia     Vision  Vision: Within Functional Limits     Strength:    Strength: Generally decreased, functional (LUE/LE; essentially flaccid throughout RUE/LE except for increased tone in fingers and great toe extension)    Tone & Sensation:   Tone: Abnormal (RLE increased, RUE decreased - near flaccid)  Sensation:  (NT)         Range Of Motion:  AROM: Generally decreased, functional (LUE and LLE; flaccid to increased tone t/o R side)  PROM: Grossly decreased, non-functional (BLEs)    Functional Mobility:  Bed Mobility:     Bed Mobility Training  Bed Mobility Training: Yes  Overall Level of Assistance: Dependent/Total;2 Person assistance  Transfers:     Transfer Training  Transfer Training: No  Balance:               Balance  Sitting:  (NT)  Standing:  
syncope / temporary decreased level of responsiveness:  POA. Unclear etiology. Likely vasovagal vs 2/2 UTI.  CT head showed no acute finding.  Noted chronic left MCA territory infarct on the CT head which is known.  EKG showed mild sinus no. His last echocardiogram showed preserved EF.  - Neuro following  - EEG and MRI     Urinary retention / likely enterococcal CAUTI: POA. Most likely explanation of the above. Pate exchanged in ED. Ucx growing enterococcus.  - Cont routine pate care and leave pate in until uro follow up (scheduled)  - uro evaluated  - switched abx from ctx to levaquin (previously grew enterococcus that was susceptible to levaquin)  - follow sensitivities     Ureteral calculi, hematuria: POA. Chronic. No hydronephrosis or hydroureter on the CT. Scheduled for CRULS. Uro evaluated and recommended follow up as previously scheduled  - outpt follow up     Neurogenic bladder due to CVA, Chronic indwelling Pate: Catheter is changed in the ED.     A-fib: Continue amiodarone, Coreg, Eliquis     Hypertension: Continue home meds.     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  Continue aspirin, statin.  Consult PT, OT.     Depression: Continue Remeron.       POLST: Patient not ready    **Prior records, notes, labs, radiology, and medications reviewed in [facility]**       Care Plan discussed with: Patient, Family, Care Manager, and Nursing Staff    Discussed:  Care Plan and D/C Planning    Prophylaxis:   on eliquis    Disposition:  SNF/LTC           ___________________________________________________    Attending Physician: Meir Silva MD       
12.5 mg at 04/27/25 1732    finasteride (PROSCAR) tablet 5 mg, 5 mg, Oral, Daily, Bruce Velasco MD, 5 mg at 04/27/25 0904    mirtazapine (REMERON) tablet 15 mg, 15 mg, Oral, Nightly, Bruce Velasco MD, 15 mg at 04/26/25 2014    tamsulosin (FLOMAX) capsule 0.4 mg, 0.4 mg, Oral, QHS, Bruce Velasco MD, 0.4 mg at 04/26/25 2014      =====================================    MEDICAL DECISION MAKING (2 of 3: A +/- B +/- C)    A) Number and Complexity of Problem Addressed:  [] 1 stable, acute illness (Low)  [] 1 stable, chronic illness (Low)  [] 1 acute illness with systemic symptoms (Moderate)  [] 1 undiagnosed new problem with uncertain prognosis (Moderate)  [] 1 or more chronic illness with exacerbation, progression, or side effects of treatment (Moderate)  [] 1 or more chronic illnesses with severe exacerbation, progression, or side effects of treatment (High)  [x] 1 acute or chronic illness or injury that poses a threat to life or bodily function (High)    B) Amount/ Complexity of Data reviewed (1 of 3 categories = Moderate, 2 of 3 categories = High)    Test, documents, or independent historian(s) (any THREE ELEMENTS):   []  Review of external note(s) from unique source:  [x]  Review of the result(s) of each unique test ( 1 )   []  Ordered a unique test  []  Discussed with a historian other than the patient:     Independent interpretation of a test performed by another Physician / QHP:     [] Yes      Discussion of management or test interpretation with another Physician / QHP:     [] Yes  Discussed with Dr wilmer Zapien (secure messaging)     C) Risk of Complication, Morbidity, or Mortality:     Escalation of hospital level of care (High Risk):  [] Yes [] No  Decision to de-escalate care or DNR due to poor prognosis (High Risk):     [] Yes   [] No  Parenteral controlled substances prescribed (High Risk):     [] Yes [] No  Drug Therapy requiring intensive monitoring for toxicity (High Risk)  [] Yes   [] No  Prescription 
Scores Predict Acute Care Hospital Discharge Destination, Physical Therapy, Volume 94, Issue 9, 1 September 2014, Pages 3804-7308, [email or web address]       Pain Rating:  Some facial grimacing and reports some pain with mild PROM on LLE  Pain Intervention(s):   repositioning    Activity Tolerance:   Fair  and Poor    After treatment:   Patient left in no apparent distress in bed, Call bell within reach, and Bed/ chair alarm activated    COMMUNICATION/EDUCATION:   The patient's plan of care was discussed with: physical therapist and registered nurse    Patient Education  Education Given To: Family;Patient  Education Provided: Role of Therapy  Education Method: Verbal  Barriers to Learning: Cognition  Education Outcome: Unable to verbalize    Thank you for this referral.  Ada Fernandez OTR/L  Minutes: 12    Occupational Therapy Evaluation Charge Determination   History Examination Decision-Making   LOW Complexity : Brief history review  LOW Complexity: 1-3 Performance deficits relating to physical, cognitive, or psychosocial skills that result in activity limitations and/or participation restrictions LOW Complexity: No comorbidities that affect functional and  no verbal  or physical assist needed to complete eval tasks   Based on the above components, the patient evaluation is determined to be of the following complexity level: Low

## 2025-04-28 NOTE — TELEPHONE ENCOUNTER
Received fax from [facility] Dr. Hampton's office requesting cardiac and medication clearance for Eliquis and Aspirin for cystoscopy, lithotripsy and stent placement under General scheduled tomorrow 4-29-25.    Fax: [phone]

## 2025-04-28 NOTE — TELEPHONE ENCOUNTER
Returned call to [facility] and asked to speak to Norma.  informed me they are in clinic and I cannot speak to anyone. She also informed me that no one from [facility] called today. Message left with our fax number as I have not received any forms for clearance.

## 2025-04-28 NOTE — DISCHARGE SUMMARY
Hospitalist Discharge Summary     Patient ID:  Taj Clemens  [id number]  64 y.o.  1960    Admit date: 4/25/2025    Discharge date and time: 4/28/2025    Admission Diagnoses: Syncope and collapse [R55]  Ureterolithiasis [N20.1]  Syncope, unspecified syncope type [R55]  Urinary tract infection associated with catheterization of urinary tract, unspecified indwelling urinary catheter type, initial encounter [T83.511A, N39.0]  Hematuria, unspecified type [R31.9]    Discharge Diagnoses:    Principal Problem:    Syncope and collapse  Active Problems:    Unresponsive episode  Resolved Problems:    * No resolved hospital problems. *         Hospital Course:   Taj is a 64 y.o.  male with PMHx of CVA with residual aphasia and right-sided hemiparesis, neurogenic bladder with chronic indwelling Pate catheter, atrial fibrillation.  Patient presented from SNF for syncope.     Suspected syncope / temporary decreased level of responsiveness:  POA. Unclear etiology. Likely vasovagal vs 2/2 UTI.  CT head showed no acute finding.  Noted chronic left MCA territory infarct on the CT head which is known.  EKG showed mild sinus no. His last echocardiogram showed preserved EF.  - Neuro evaluated  - EEG and MRI both neg     Urinary retention / likely enterococcal CAUTI: POA. Most likely explanation of the above. Pate exchanged in ED. Ucx growing enterococcus.  - Cont routine pate care and leave pate in until uro follow up (scheduled)  - procedure planned in outpatient setting tomorrow  - levaquin (previously grew enterococcus that was susceptible to levaquin)  - follow sensitivities  - uro following and will cont to follow after dc     Ureteral calculi, hematuria: POA. Chronic. No hydronephrosis or hydroureter on the CT. Scheduled for CRULS. Uro evaluated and recommended follow up as previously scheduled  - as above  - procedure planned in outpatient setting tomorrow     Neurogenic bladder due to CVA, Chronic indwelling

## 2025-04-28 NOTE — TELEPHONE ENCOUNTER
Norma is calling from [facility] because they are trying to find out the status for the patient's medication hold on Eliquis.    Form was faxed over 4/25/25 to [phone].Please fax back completed form for clearance.Patient is schedule for 4/29/25.    [phone] fax  [phone] office Norma

## 2025-04-28 NOTE — DISCHARGE INSTRUCTIONS
HOSPITALIST DISCHARGE INSTRUCTIONS  NAME:  Taj Clemens   :  1960   MRN:  [id number]     Date/Time:  2025 1:42 PM    ADMIT DATE: 2025     DISCHARGE DATE: 2025     DISCHARGE DIAGNOSIS:  Altered mental status due to UTI    DISCHARGE INSTRUCTIONS:  Thank you for allowing us to participate in your care. Your discharging Hospitalist is Meir Silva MD. You were admitted for evaluation and treatment of the above. You were given antibiotics and your symptoms improved. You are well enough to be discharged now. Please take your antibiotics as prescribed and follow up with your urologist. Your outpatient procedure is scheduled for tomorrow      MEDICATIONS:    It is important that you take the medication exactly as they are prescribed.   Keep your medication in the bottles provided by the pharmacist and keep a list of the medication names, dosages, and times to be taken in your wallet.   Do not take other medications without consulting your doctor.             If you experience any of the following symptoms then please call your primary care physician or return to the emergency room if you cannot get hold of your doctor:  Fever, chills, nausea, vomiting, diarrhea, change in mentation, falling, bleeding, shortness of breath    Follow Up:  Please call the below provider to arrange hospital follow up appointment      Soy Meyers MD  [address] [facility] [address] [phone]    Follow up      Naya Kaur, APRN - NP  [address]   [facility] [address] [phone]              Information obtained by :  I understand that if any problems occur once I am at home I am to contact my physician.    I understand and acknowledge receipt of the instructions indicated above.                                                                                                                                           Physician's or R.N.'s Signature

## 2025-04-28 NOTE — PLAN OF CARE
Patient has discharge paperwork. All questions were answered. All belongings are packed and IV removed. AMS transport arranged.

## 2025-04-28 NOTE — CARE COORDINATION
Care Management Progress Note    Reason for Admission:   Syncope and collapse [R55]  Ureterolithiasis [N20.1]  Syncope, unspecified syncope type [R55]  Urinary tract infection associated with catheterization of urinary tract, unspecified indwelling urinary catheter type, initial encounter [T83.511A, N39.0]  Hematuria, unspecified type [R31.9]         Patient Admission Status: Observation  RUR:  OBS  Hospitalization in the last 30 days (Readmission):  No        CM reviewed EMR and handoff was received from weekend  (Anusha).   Reportedly, pt is a resident of [facility]    Transition Plan of Care:  Urology, Neurology following for medical management   PT/OT/ST evals complete; pt is depn/total care; and with expressive aphasia  Plan is for pt to return to [facility]  Outpatient follow up  Ambulance will be arranged    CM will continue to follow pt until discharged to LTC  Janet

## 2025-04-30 NOTE — TELEPHONE ENCOUNTER
Viridiana Gillis MD  You18 hours ago (2:52 PM)       Hold Eliquis 2 days  Hold ASA 2-7 days surgeon choice  Resume 1 day  Form signed  Future Appointments  7/9/2025   11:00 AM   Viridiana Gillis MD [facility] AMB     Clearance faxed. Confirmation received.

## 2025-06-16 ENCOUNTER — TRANSCRIBE ORDERS (OUTPATIENT)
Facility: HOSPITAL | Age: 65
End: 2025-06-16

## 2025-06-16 DIAGNOSIS — N20.0 KIDNEY STONE: Primary | ICD-10-CM

## 2025-07-09 ENCOUNTER — OFFICE VISIT (OUTPATIENT)
Age: 65
End: 2025-07-09
Payer: MEDICARE

## 2025-07-09 VITALS
HEART RATE: 57 BPM | HEIGHT: 72 IN | RESPIRATION RATE: 16 BRPM | SYSTOLIC BLOOD PRESSURE: 120 MMHG | OXYGEN SATURATION: 95 % | DIASTOLIC BLOOD PRESSURE: 60 MMHG | BODY MASS INDEX: 27.36 KG/M2 | WEIGHT: 202 LBS

## 2025-07-09 DIAGNOSIS — I48.3 TYPICAL ATRIAL FLUTTER (HCC): Primary | ICD-10-CM

## 2025-07-09 DIAGNOSIS — R93.1 CARDIAC LV EJECTION FRACTION 21-30%: ICD-10-CM

## 2025-07-09 DIAGNOSIS — I63.412 ACUTE STROKE DUE TO EMBOLISM OF LEFT MIDDLE CEREBRAL ARTERY (HCC): ICD-10-CM

## 2025-07-09 DIAGNOSIS — I67.9 CEREBRAL VASCULAR DISEASE: ICD-10-CM

## 2025-07-09 DIAGNOSIS — I50.22 CHRONIC SYSTOLIC CONGESTIVE HEART FAILURE (HCC): ICD-10-CM

## 2025-07-09 DIAGNOSIS — I10 ESSENTIAL HYPERTENSION: ICD-10-CM

## 2025-07-09 PROCEDURE — G2211 COMPLEX E/M VISIT ADD ON: HCPCS | Performed by: SPECIALIST

## 2025-07-09 PROCEDURE — 3017F COLORECTAL CA SCREEN DOC REV: CPT | Performed by: SPECIALIST

## 2025-07-09 PROCEDURE — G8417 CALC BMI ABV UP PARAM F/U: HCPCS | Performed by: SPECIALIST

## 2025-07-09 PROCEDURE — G8428 CUR MEDS NOT DOCUMENT: HCPCS | Performed by: SPECIALIST

## 2025-07-09 PROCEDURE — 3074F SYST BP LT 130 MM HG: CPT | Performed by: SPECIALIST

## 2025-07-09 PROCEDURE — 1123F ACP DISCUSS/DSCN MKR DOCD: CPT | Performed by: SPECIALIST

## 2025-07-09 PROCEDURE — 99214 OFFICE O/P EST MOD 30 MIN: CPT | Performed by: SPECIALIST

## 2025-07-09 PROCEDURE — 3078F DIAST BP <80 MM HG: CPT | Performed by: SPECIALIST

## 2025-07-09 PROCEDURE — 1036F TOBACCO NON-USER: CPT | Performed by: SPECIALIST

## 2025-07-09 PROCEDURE — 1126F AMNT PAIN NOTED NONE PRSNT: CPT | Performed by: SPECIALIST

## 2025-07-09 RX ORDER — ISOSORBIDE MONONITRATE 30 MG/1
30 TABLET, EXTENDED RELEASE ORAL DAILY
COMMUNITY
Start: 2025-06-01

## 2025-07-09 RX ORDER — CALCIUM CARBONATE 500 MG/1
1 TABLET, CHEWABLE ORAL DAILY
COMMUNITY

## 2025-07-09 RX ORDER — CETIRIZINE HYDROCHLORIDE 10 MG/1
10 TABLET ORAL
COMMUNITY
Start: 2025-04-25

## 2025-07-09 RX ORDER — ESCITALOPRAM OXALATE 5 MG/1
TABLET ORAL
COMMUNITY
Start: 2025-06-24

## 2025-07-09 RX ORDER — CARVEDILOL 6.25 MG/1
TABLET ORAL
COMMUNITY
Start: 2025-06-29

## 2025-07-09 ASSESSMENT — PATIENT HEALTH QUESTIONNAIRE - PHQ9
1. LITTLE INTEREST OR PLEASURE IN DOING THINGS: NOT AT ALL
2. FEELING DOWN, DEPRESSED OR HOPELESS: NOT AT ALL
SUM OF ALL RESPONSES TO PHQ QUESTIONS 1-9: 0

## 2025-07-09 NOTE — PROGRESS NOTES
Taj Clemens     1960       Viridiana Gillis MD, [facility]  Date of Visit-7/17/2025   PCP is Carolee Murphy MD   [facility]  [facility]  HPI:  Taj Clemens is a 65 y.o. male   6-month  Atrial flutter, acute systolic CHF, stroke, atherogenesis CT abdomen, hypertension, IGLESIA, amiodarone use      Admitted 4/25/2025 to 4/28/2025 with syncope hematuria urolithiasis, CT head showed chronic left MCA territory infarct , culture grew Enterococcus placed on Levaquin ureteral calculi and hematuria were noted he was continued on amiodarone Coreg Eliquis for A-fib.  Hemoglobin at baseline is 12-13 hemoglobin on hospitalization was 11.6.  Patient was in sinus rhythm.  Today   No specific cardiac issues are reported by patient or his .  They have been frustrated since he has not been progressing poststroke very much.  Denies chest painsyncope or shortness of breath at rest   He has chronic 2+ edema.  He is now on Lexapro.  He is at [facility].  He sees Dr. Carolee Murphy  Has no tachycardia , palpitations or sense of arrythmia      previously   Hospitalized 8/11/2024 for 2 nights with chest pain found to have pancreatitis elevated LFTs CT was negative  Lab work 12/21/2024 showed normal sodium 140 potassium 3.5 creatinine 1.0 hemoglobin 12  Last TSH 8/11/2024 was 8.67  EKG 12/19/2024 sinus rhythm with nonspecific T wave finding  His echo in May showed return of LV function normal.  He was then rehospitalized with what sounds like more of a UTI or tramadol effect.  He had a monitor done on 5/29/2024 that for 14 days showed normal rhythm at sinus.  There were rare PACs and PVCs.    History of atrial fibrillation hypertension obesity  Had acute stroke and A-fib rate of 200s with RVR.   New systolic CHF. EF 25 then 40%  Patient got cardioversion   CT showed a left-sided MCA infarct large vessel occlusion of left MCA.    Patient was transferred to [facility]

## 2025-07-16 ENCOUNTER — TELEPHONE (OUTPATIENT)
Age: 65
End: 2025-07-16

## 2025-07-27 ENCOUNTER — HOSPITAL ENCOUNTER (INPATIENT)
Facility: HOSPITAL | Age: 65
LOS: 4 days | Discharge: NURSING, REHAB OR LONG-TERM CARE | DRG: 309 | End: 2025-07-31
Attending: EMERGENCY MEDICINE | Admitting: STUDENT IN AN ORGANIZED HEALTH CARE EDUCATION/TRAINING PROGRAM
Payer: MEDICARE

## 2025-07-27 ENCOUNTER — APPOINTMENT (OUTPATIENT)
Facility: HOSPITAL | Age: 65
DRG: 309 | End: 2025-07-27
Payer: MEDICARE

## 2025-07-27 DIAGNOSIS — I24.9 ACUTE CORONARY SYNDROME (HCC): ICD-10-CM

## 2025-07-27 DIAGNOSIS — R07.9 CHEST PAIN, UNSPECIFIED TYPE: Primary | ICD-10-CM

## 2025-07-27 DIAGNOSIS — R00.1 BRADYCARDIA: ICD-10-CM

## 2025-07-27 LAB
ALBUMIN SERPL-MCNC: 3.3 G/DL (ref 3.5–5)
ALBUMIN/GLOB SERPL: 0.8 (ref 1.1–2.2)
ALP SERPL-CCNC: 66 U/L (ref 45–117)
ALT SERPL-CCNC: 108 U/L (ref 12–78)
ANION GAP SERPL CALC-SCNC: 2 MMOL/L (ref 2–12)
ANION GAP SERPL CALC-SCNC: 4 MMOL/L (ref 2–12)
AST SERPL-CCNC: 56 U/L (ref 15–37)
BASOPHILS # BLD: 0.04 K/UL (ref 0–0.1)
BASOPHILS NFR BLD: 1 % (ref 0–1)
BILIRUB SERPL-MCNC: 0.5 MG/DL (ref 0.2–1)
BUN SERPL-MCNC: 12 MG/DL (ref 6–20)
BUN SERPL-MCNC: 14 MG/DL (ref 6–20)
BUN/CREAT SERPL: 16 (ref 12–20)
BUN/CREAT SERPL: 16 (ref 12–20)
CALCIUM SERPL-MCNC: 9 MG/DL (ref 8.5–10.1)
CALCIUM SERPL-MCNC: 9 MG/DL (ref 8.5–10.1)
CHLORIDE SERPL-SCNC: 110 MMOL/L (ref 97–108)
CHLORIDE SERPL-SCNC: 113 MMOL/L (ref 97–108)
CO2 SERPL-SCNC: 26 MMOL/L (ref 21–32)
CO2 SERPL-SCNC: 27 MMOL/L (ref 21–32)
COMMENT:: NORMAL
CREAT SERPL-MCNC: 0.74 MG/DL (ref 0.7–1.3)
CREAT SERPL-MCNC: 0.87 MG/DL (ref 0.7–1.3)
DIFFERENTIAL METHOD BLD: ABNORMAL
EOSINOPHIL # BLD: 0.11 K/UL (ref 0–0.4)
EOSINOPHIL NFR BLD: 2.8 % (ref 0–7)
ERYTHROCYTE [DISTWIDTH] IN BLOOD BY AUTOMATED COUNT: 15.7 % (ref 11.5–14.5)
GLOBULIN SER CALC-MCNC: 3.9 G/DL (ref 2–4)
GLUCOSE SERPL-MCNC: 81 MG/DL (ref 65–100)
GLUCOSE SERPL-MCNC: 86 MG/DL (ref 65–100)
HCT VFR BLD AUTO: 37.3 % (ref 36.6–50.3)
HGB BLD-MCNC: 12.2 G/DL (ref 12.1–17)
IMM GRANULOCYTES # BLD AUTO: 0.01 K/UL (ref 0–0.04)
IMM GRANULOCYTES NFR BLD AUTO: 0.3 % (ref 0–0.5)
LIPASE SERPL-CCNC: 54 U/L (ref 13–75)
LYMPHOCYTES # BLD: 1.86 K/UL (ref 0.8–3.5)
LYMPHOCYTES NFR BLD: 47.3 % (ref 12–49)
MCH RBC QN AUTO: 30.9 PG (ref 26–34)
MCHC RBC AUTO-ENTMCNC: 32.7 G/DL (ref 30–36.5)
MCV RBC AUTO: 94.4 FL (ref 80–99)
MONOCYTES # BLD: 0.27 K/UL (ref 0–1)
MONOCYTES NFR BLD: 6.9 % (ref 5–13)
NEUTS SEG # BLD: 1.64 K/UL (ref 1.8–8)
NEUTS SEG NFR BLD: 41.7 % (ref 32–75)
NRBC # BLD: 0 K/UL (ref 0–0.01)
NRBC BLD-RTO: 0 PER 100 WBC
PLATELET # BLD AUTO: 256 K/UL (ref 150–400)
PMV BLD AUTO: 9.3 FL (ref 8.9–12.9)
POTASSIUM SERPL-SCNC: 3.9 MMOL/L (ref 3.5–5.1)
POTASSIUM SERPL-SCNC: 4 MMOL/L (ref 3.5–5.1)
PROT SERPL-MCNC: 7.2 G/DL (ref 6.4–8.2)
RBC # BLD AUTO: 3.95 M/UL (ref 4.1–5.7)
SODIUM SERPL-SCNC: 141 MMOL/L (ref 136–145)
SODIUM SERPL-SCNC: 141 MMOL/L (ref 136–145)
SPECIMEN HOLD: NORMAL
TROPONIN I SERPL HS-MCNC: 7 NG/L (ref 0–76)
TROPONIN I SERPL HS-MCNC: 7 NG/L (ref 0–76)
TROPONIN I SERPL HS-MCNC: 8 NG/L (ref 0–76)
WBC # BLD AUTO: 3.9 K/UL (ref 4.1–11.1)

## 2025-07-27 PROCEDURE — 71045 X-RAY EXAM CHEST 1 VIEW: CPT

## 2025-07-27 PROCEDURE — 2580000003 HC RX 258: Performed by: STUDENT IN AN ORGANIZED HEALTH CARE EDUCATION/TRAINING PROGRAM

## 2025-07-27 PROCEDURE — 93005 ELECTROCARDIOGRAM TRACING: CPT | Performed by: EMERGENCY MEDICINE

## 2025-07-27 PROCEDURE — 36415 COLL VENOUS BLD VENIPUNCTURE: CPT

## 2025-07-27 PROCEDURE — 6360000004 HC RX CONTRAST MEDICATION: Performed by: RADIOLOGY

## 2025-07-27 PROCEDURE — 1100000000 HC RM PRIVATE

## 2025-07-27 PROCEDURE — 85025 COMPLETE CBC W/AUTO DIFF WBC: CPT

## 2025-07-27 PROCEDURE — 80053 COMPREHEN METABOLIC PANEL: CPT

## 2025-07-27 PROCEDURE — 99285 EMERGENCY DEPT VISIT HI MDM: CPT

## 2025-07-27 PROCEDURE — 2500000003 HC RX 250 WO HCPCS: Performed by: STUDENT IN AN ORGANIZED HEALTH CARE EDUCATION/TRAINING PROGRAM

## 2025-07-27 PROCEDURE — 83690 ASSAY OF LIPASE: CPT

## 2025-07-27 PROCEDURE — 6370000000 HC RX 637 (ALT 250 FOR IP): Performed by: STUDENT IN AN ORGANIZED HEALTH CARE EDUCATION/TRAINING PROGRAM

## 2025-07-27 PROCEDURE — 71275 CT ANGIOGRAPHY CHEST: CPT

## 2025-07-27 PROCEDURE — 84484 ASSAY OF TROPONIN QUANT: CPT

## 2025-07-27 RX ORDER — ACETAMINOPHEN 650 MG/1
650 SUPPOSITORY RECTAL EVERY 6 HOURS PRN
Status: DISCONTINUED | OUTPATIENT
Start: 2025-07-27 | End: 2025-07-31 | Stop reason: HOSPADM

## 2025-07-27 RX ORDER — ISOSORBIDE MONONITRATE 30 MG/1
30 TABLET, EXTENDED RELEASE ORAL DAILY
Status: DISCONTINUED | OUTPATIENT
Start: 2025-07-27 | End: 2025-07-29

## 2025-07-27 RX ORDER — MAGNESIUM SULFATE IN WATER 40 MG/ML
2000 INJECTION, SOLUTION INTRAVENOUS PRN
Status: DISCONTINUED | OUTPATIENT
Start: 2025-07-27 | End: 2025-07-28

## 2025-07-27 RX ORDER — CARVEDILOL 6.25 MG/1
6.25 TABLET ORAL 2 TIMES DAILY WITH MEALS
Status: DISCONTINUED | OUTPATIENT
Start: 2025-07-27 | End: 2025-07-28

## 2025-07-27 RX ORDER — ASPIRIN 325 MG
325 TABLET ORAL
Status: DISCONTINUED | OUTPATIENT
Start: 2025-07-27 | End: 2025-07-27

## 2025-07-27 RX ORDER — SODIUM CHLORIDE 0.9 % (FLUSH) 0.9 %
5-40 SYRINGE (ML) INJECTION PRN
Status: DISCONTINUED | OUTPATIENT
Start: 2025-07-27 | End: 2025-07-31 | Stop reason: HOSPADM

## 2025-07-27 RX ORDER — SODIUM CHLORIDE 9 MG/ML
INJECTION, SOLUTION INTRAVENOUS PRN
Status: DISCONTINUED | OUTPATIENT
Start: 2025-07-27 | End: 2025-07-31 | Stop reason: HOSPADM

## 2025-07-27 RX ORDER — ASPIRIN 81 MG/1
81 TABLET, CHEWABLE ORAL DAILY
Status: DISCONTINUED | OUTPATIENT
Start: 2025-07-28 | End: 2025-07-31 | Stop reason: HOSPADM

## 2025-07-27 RX ORDER — SENNA AND DOCUSATE SODIUM 50; 8.6 MG/1; MG/1
1 TABLET, FILM COATED ORAL NIGHTLY
Status: DISCONTINUED | OUTPATIENT
Start: 2025-07-27 | End: 2025-07-31 | Stop reason: HOSPADM

## 2025-07-27 RX ORDER — TAMSULOSIN HYDROCHLORIDE 0.4 MG/1
0.4 CAPSULE ORAL
Status: DISCONTINUED | OUTPATIENT
Start: 2025-07-27 | End: 2025-07-31 | Stop reason: HOSPADM

## 2025-07-27 RX ORDER — POLYETHYLENE GLYCOL 3350 17 G/17G
17 POWDER, FOR SOLUTION ORAL DAILY PRN
Status: DISCONTINUED | OUTPATIENT
Start: 2025-07-27 | End: 2025-07-31 | Stop reason: HOSPADM

## 2025-07-27 RX ORDER — POTASSIUM CHLORIDE 750 MG/1
40 TABLET, EXTENDED RELEASE ORAL PRN
Status: DISCONTINUED | OUTPATIENT
Start: 2025-07-27 | End: 2025-07-28

## 2025-07-27 RX ORDER — SODIUM CHLORIDE 9 MG/ML
INJECTION, SOLUTION INTRAVENOUS CONTINUOUS
Status: DISCONTINUED | OUTPATIENT
Start: 2025-07-27 | End: 2025-07-28

## 2025-07-27 RX ORDER — FINASTERIDE 5 MG/1
5 TABLET, FILM COATED ORAL DAILY
Status: DISCONTINUED | OUTPATIENT
Start: 2025-07-28 | End: 2025-07-31 | Stop reason: HOSPADM

## 2025-07-27 RX ORDER — ONDANSETRON 2 MG/ML
4 INJECTION INTRAMUSCULAR; INTRAVENOUS EVERY 6 HOURS PRN
Status: DISCONTINUED | OUTPATIENT
Start: 2025-07-27 | End: 2025-07-29

## 2025-07-27 RX ORDER — MIRTAZAPINE 15 MG/1
15 TABLET, FILM COATED ORAL NIGHTLY
Status: DISCONTINUED | OUTPATIENT
Start: 2025-07-27 | End: 2025-07-31 | Stop reason: HOSPADM

## 2025-07-27 RX ORDER — SODIUM CHLORIDE 0.9 % (FLUSH) 0.9 %
5-40 SYRINGE (ML) INJECTION EVERY 12 HOURS SCHEDULED
Status: DISCONTINUED | OUTPATIENT
Start: 2025-07-27 | End: 2025-07-31 | Stop reason: HOSPADM

## 2025-07-27 RX ORDER — ACETAMINOPHEN 325 MG/1
650 TABLET ORAL EVERY 6 HOURS PRN
Status: DISCONTINUED | OUTPATIENT
Start: 2025-07-27 | End: 2025-07-31 | Stop reason: HOSPADM

## 2025-07-27 RX ORDER — AMLODIPINE BESYLATE 5 MG/1
5 TABLET ORAL DAILY
Status: DISCONTINUED | OUTPATIENT
Start: 2025-07-27 | End: 2025-07-28

## 2025-07-27 RX ORDER — ONDANSETRON 4 MG/1
4 TABLET, ORALLY DISINTEGRATING ORAL EVERY 8 HOURS PRN
Status: DISCONTINUED | OUTPATIENT
Start: 2025-07-27 | End: 2025-07-29

## 2025-07-27 RX ORDER — ESCITALOPRAM OXALATE 10 MG/1
5 TABLET ORAL DAILY
Status: DISCONTINUED | OUTPATIENT
Start: 2025-07-28 | End: 2025-07-29

## 2025-07-27 RX ORDER — ATORVASTATIN CALCIUM 20 MG/1
80 TABLET, FILM COATED ORAL NIGHTLY
Status: DISCONTINUED | OUTPATIENT
Start: 2025-07-27 | End: 2025-07-31 | Stop reason: HOSPADM

## 2025-07-27 RX ORDER — IOPAMIDOL 755 MG/ML
100 INJECTION, SOLUTION INTRAVASCULAR
Status: COMPLETED | OUTPATIENT
Start: 2025-07-27 | End: 2025-07-27

## 2025-07-27 RX ORDER — ERGOCALCIFEROL 1.25 MG/1
50000 CAPSULE, LIQUID FILLED ORAL
Status: DISCONTINUED | OUTPATIENT
Start: 2025-08-27 | End: 2025-07-31 | Stop reason: HOSPADM

## 2025-07-27 RX ORDER — POTASSIUM CHLORIDE 7.45 MG/ML
10 INJECTION INTRAVENOUS PRN
Status: DISCONTINUED | OUTPATIENT
Start: 2025-07-27 | End: 2025-07-28

## 2025-07-27 RX ADMIN — Medication 6 MG: at 20:08

## 2025-07-27 RX ADMIN — IOPAMIDOL 100 ML: 755 INJECTION, SOLUTION INTRAVENOUS at 19:28

## 2025-07-27 RX ADMIN — ISOSORBIDE MONONITRATE 30 MG: 30 TABLET, EXTENDED RELEASE ORAL at 20:08

## 2025-07-27 RX ADMIN — ATORVASTATIN CALCIUM 80 MG: 20 TABLET, FILM COATED ORAL at 20:08

## 2025-07-27 RX ADMIN — SODIUM CHLORIDE: 0.9 INJECTION, SOLUTION INTRAVENOUS at 20:20

## 2025-07-27 RX ADMIN — GABAPENTIN 400 MG: 100 CAPSULE ORAL at 21:23

## 2025-07-27 RX ADMIN — DOCUSATE SODIUM 50MG AND SENNOSIDES 8.6MG 1 TABLET: 8.6; 5 TABLET, FILM COATED ORAL at 20:08

## 2025-07-27 RX ADMIN — APIXABAN 5 MG: 5 TABLET, FILM COATED ORAL at 21:23

## 2025-07-27 RX ADMIN — TAMSULOSIN HYDROCHLORIDE 0.4 MG: 0.4 CAPSULE ORAL at 20:08

## 2025-07-27 RX ADMIN — MIRTAZAPINE 15 MG: 15 TABLET, FILM COATED ORAL at 20:04

## 2025-07-27 RX ADMIN — AMLODIPINE BESYLATE 5 MG: 5 TABLET ORAL at 23:02

## 2025-07-27 RX ADMIN — SODIUM CHLORIDE, PRESERVATIVE FREE 10 ML: 5 INJECTION INTRAVENOUS at 20:23

## 2025-07-27 ASSESSMENT — PAIN - FUNCTIONAL ASSESSMENT: PAIN_FUNCTIONAL_ASSESSMENT: ADULT NONVERBAL PAIN SCALE (NPVS)

## 2025-07-27 NOTE — H&P
Hospitalist Admission Note    NAME:  Taj Clemens   :  1960   MRN:  [id number]     Date/Time:  2025 7:00 PM    Patient PCP: Carolee Murphy MD  ________________________________________________________________________    Given the patient's current clinical presentation, I have a high level of concern for decompensation if discharged from the emergency department.  Complex decision making was performed, which includes reviewing the patient's available past medical records, laboratory results, and x-ray films.       My assessment of this patient's clinical condition and my plan of care is as follows.    Assessment / Plan:    65 y.o. male with past medical history of CVA, right-sided hemiparesis, CHF, A-fib, on Eliquis and Coreg, HTN, HLD who presented to the emergency room with main complaint of not feeling well, was found to be bradycardic    # Chest pain  Most likely MSK less likely cardiac/pulm  Patient reports pain has been persistently going on for 2 weeks  Pain is reproducible on physical exam  Troponin x 2 negative  Less likely PE as patient is already on Eliquis for A-fib, however patient is bedbound  Plan  Will continue trending troponins  Will continue patient's home Eliquis  Obtain CTA pulm to rule out PE as patient is bedbound  Continue aspirin/statin  TTE  Cardiology consult     # Asymptomatic bradycardia  Patient on Coreg at home  Plan  Will hold home Coreg  Monitor on telemetry  Cardiology consulted    # Hx A-fib  On Eliquis, will continue  Hold home Coreg    #CHF  No acute exacerbation  Follow-up repeat TTE, cardiology     # Hx CVA  With residual right-sided hemiplegia  Continue aspirin/statin     # BPH  Continue home meds    #Mood disorder  Continue home meds  #HLD  -Continue pt home meds  #HTN  -Vitals per protocol   -Continue pt home meds    I have personally reviewed the radiographs, laboratory data in Epic and decisions and statements above are based partially on this personal  400 MG capsule Take 1 capsule by mouth 3 times daily for 30 days. Max Daily Amount: 1,200 mg 8/13/24 7/9/25  Judie Mims MD   fluticasone (FLONASE) 50 MCG/ACT nasal spray 1 spray by Each Nostril route daily    Sherif Dao MD   vitamin D (ERGOCALCIFEROL) 1.25 MG (35800 UT) CAPS capsule Take 1 capsule by mouth every 30 days    Sherif Dao MD   Cyanocobalamin (VITAMIN B 12 PO) Take 1,000 mcg by mouth    Sherif Dao MD   ascorbic acid (VITAMIN C) 500 MG tablet Take 1 tablet by mouth 2 times daily    Sherif Dao MD   baclofen (LIORESAL) 10 MG tablet Take 1 tablet by mouth 3 times daily    Sherif Dao MD   mirtazapine (REMERON) 15 MG tablet Take 1 tablet by mouth nightly    Sherif Dao MD   senna-docusate (PERICOLACE) 8.6-50 MG per tablet Take 1 tablet by mouth at bedtime    Sherif Dao MD   apixaban (ELIQUIS) 5 MG TABS tablet Take by mouth 2 times daily    Sherif Dao MD   melatonin 3 MG TABS tablet Take 2 tablets by mouth at bedtime    Sherif Dao MD   tamsulosin (FLOMAX) 0.4 MG capsule Take 1 capsule by mouth nightly    Sherif Dao MD   finasteride (PROSCAR) 5 MG tablet Take 1 tablet by mouth daily    Sherif Dao MD   aspirin 81 MG chewable tablet Take 1 tablet by mouth daily    Sherif Dao MD   vitamin D 25 MCG (1000 UT) CAPS Take by mouth daily    Automatic Reconciliation, Ar       REVIEW OF SYSTEMS:  See HPI for details        Objective:   VITALS:    Vitals:    07/27/25 1559   BP: (!) 151/89   Pulse: 53   Resp: 16   Temp: 98.6 °F (37 °C)   SpO2: 98%     PHYSICAL EXAM:    Physical Exam:    Gen: Chronically ill looking, in no acute distress  HEENT:  Pink conjunctivae, PERRL, hearing intact to voice, moist mucous membranes  Neck: Supple, without masses, thyroid non-tender  Resp: No accessory muscle use, clear breath sounds without wheezes rales or rhonchi  Card: Reproducible retrosternal

## 2025-07-27 NOTE — ED TRIAGE NOTES
Pt arrives via EMS with c/o abd/chest pain x 1 day.   Endorses pain in the lower lefft chest.   Denies n/v, SOB.  Saranya Kurtis gave 3 tabs of nitro and EMS gave 324 aspirin   PMH CVA with expressive aphasia.

## 2025-07-27 NOTE — ED PROVIDER NOTES
[facility] EMERGENCY DEPARTMENT  EMERGENCY DEPARTMENT ENCOUNTER      Pt Name: Taj Clemens  MRN: [id number]  Birthdate 1960  Date of evaluation: 7/27/2025  Provider: Juanpablo Colin MD    CHIEF COMPLAINT       Chief Complaint   Patient presents with    Chest Pain    Abdominal Pain         HISTORY OF PRESENT ILLNESS   (Location/Symptom, Timing/Onset, Context/Setting, Quality, Duration, Modifying Factors, Severity)  Note limiting factors.   65M w/ hx CVA and right sided hemiparesis, CHF, AF on DOAC, HTN, HLD p/w 1d chest pain. Pt is nonverbal and bedbound w/ right sided hemiparesis after ischemic stroke 1 yr ago. Sent to ED from NH where gave asa and SL NTG. Per GF, pt feeling well until today when c/o intermittent left sided chest pain. No rapid/labored breathing, dyspnea, N/V. No F/C, cough or recent illnesses. Pt is a very limited historian.            Review of External Medical Records:     Nursing Notes were reviewed.    REVIEW OF SYSTEMS    (2-9 systems for level 4, 10 or more for level 5)     Review of Systems   Unable to perform ROS: Patient nonverbal       Except as noted above the remainder of the review of systems was reviewed and negative.       PAST MEDICAL HISTORY     Past Medical History:   Diagnosis Date    Allergic rhinitis 11/6/2017    Atrial fibrillation (HCC)     Constipation     Hemorrhoids     HTN (hypertension) 12/13/2012    Hypertension     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         SURGICAL HISTORY       Past Surgical History:   Procedure Laterality Date    EEG RAPID  4/27/2025    HEMORRHOID SURGERY  1993    IR MECHANICAL ART THROMBECTOMY INTRACRANIAL  3/24/2024    IR MECHANICAL ART THROMBECTOMY INTRACRANIAL 3/24/2024 [facility] RAD ANGIO IR         CURRENT MEDICATIONS       Previous Medications    ACETAMINOPHEN (TYLENOL) 325 MG TABLET    Take 2 tablets by mouth every 6 hours as needed for Pain     stability, which may vary by analyte.    Final    WBC 07/27/2025 3.9 (L)  4.1 - 11.1 K/uL Final    RBC 07/27/2025 3.95 (L)  4.10 - 5.70 M/uL Final    Hemoglobin 07/27/2025 12.2  12.1 - 17.0 g/dL Final    Hematocrit 07/27/2025 37.3  36.6 - 50.3 % Final    MCV 07/27/2025 94.4  80.0 - 99.0 FL Final    MCH 07/27/2025 30.9  26.0 - 34.0 PG Final    MCHC 07/27/2025 32.7  30.0 - 36.5 g/dL Final    RDW 07/27/2025 15.7 (H)  11.5 - 14.5 % Final    Platelets 07/27/2025 256  150 - 400 K/uL Final    MPV 07/27/2025 9.3  8.9 - 12.9 FL Final    Nucleated RBCs 07/27/2025 0.0  0  WBC Final    nRBC 07/27/2025 0.00  0.00 - 0.01 K/uL Final    Neutrophils % 07/27/2025 41.7  32.0 - 75.0 % Final    Lymphocytes % 07/27/2025 47.3  12.0 - 49.0 % Final    Monocytes % 07/27/2025 6.9  5.0 - 13.0 % Final    Eosinophils % 07/27/2025 2.8  0.0 - 7.0 % Final    Basophils % 07/27/2025 1.0  0.0 - 1.0 % Final    Immature Granulocytes % 07/27/2025 0.3  0.0 - 0.5 % Final    Neutrophils Absolute 07/27/2025 1.64 (L)  1.80 - 8.00 K/UL Final    Lymphocytes Absolute 07/27/2025 1.86  0.80 - 3.50 K/UL Final    Monocytes Absolute 07/27/2025 0.27  0.00 - 1.00 K/UL Final    Eosinophils Absolute 07/27/2025 0.11  0.00 - 0.40 K/UL Final    Basophils Absolute 07/27/2025 0.04  0.00 - 0.10 K/UL Final    Immature Granulocytes Absolute 07/27/2025 0.01  0.00 - 0.04 K/UL Final    Differential Type 07/27/2025 AUTOMATED    Final    Sodium 07/27/2025 141  136 - 145 mmol/L Final    Potassium 07/27/2025 4.0  3.5 - 5.1 mmol/L Final    Chloride 07/27/2025 113 (H)  97 - 108 mmol/L Final    CO2 07/27/2025 26  21 - 32 mmol/L Final    Anion Gap 07/27/2025 2  2 - 12 mmol/L Final    PLEASE NOTE NEW REFERENCE RANGE    Glucose 07/27/2025 86  65 - 100 mg/dL Final    BUN 07/27/2025 14  6 - 20 MG/DL Final    Creatinine 07/27/2025 0.87  0.70 - 1.30 MG/DL Final    BUN/Creatinine Ratio 07/27/2025 16  12 - 20   Final    Est, Glom Filt Rate 07/27/2025 >90  >60 ml/min/1.73m2 Final    Comment:

## 2025-07-28 ENCOUNTER — APPOINTMENT (OUTPATIENT)
Facility: HOSPITAL | Age: 65
DRG: 309 | End: 2025-07-28
Payer: MEDICARE

## 2025-07-28 ENCOUNTER — HOSPITAL ENCOUNTER (OUTPATIENT)
Facility: HOSPITAL | Age: 65
Discharge: HOME | DRG: 309 | End: 2025-07-31
Attending: UROLOGY
Payer: MEDICARE

## 2025-07-28 ENCOUNTER — APPOINTMENT (OUTPATIENT)
Facility: HOSPITAL | Age: 65
DRG: 309 | End: 2025-07-28
Attending: STUDENT IN AN ORGANIZED HEALTH CARE EDUCATION/TRAINING PROGRAM
Payer: MEDICARE

## 2025-07-28 DIAGNOSIS — N20.0 KIDNEY STONE: ICD-10-CM

## 2025-07-28 LAB
ANION GAP SERPL CALC-SCNC: 3 MMOL/L (ref 2–12)
BUN SERPL-MCNC: 10 MG/DL (ref 6–20)
BUN/CREAT SERPL: 14 (ref 12–20)
CALCIUM SERPL-MCNC: 9 MG/DL (ref 8.5–10.1)
CHLORIDE SERPL-SCNC: 112 MMOL/L (ref 97–108)
CO2 SERPL-SCNC: 27 MMOL/L (ref 21–32)
CREAT SERPL-MCNC: 0.73 MG/DL (ref 0.7–1.3)
ECHO AO ARCH DIAM: 1.3 CM
ECHO AO ASC DIAM: 3.7 CM
ECHO AO ASCENDING AORTA INDEX: 1.7 CM/M2
ECHO AO ROOT DIAM: 3.8 CM
ECHO AO ROOT INDEX: 1.74 CM/M2
ECHO AV AREA PEAK VELOCITY: 1.8 CM2
ECHO AV AREA VTI: 1.6 CM2
ECHO AV AREA/BSA PEAK VELOCITY: 0.8 CM2/M2
ECHO AV AREA/BSA VTI: 0.7 CM2/M2
ECHO AV MEAN GRADIENT: 2 MMHG
ECHO AV MEAN VELOCITY: 0.7 M/S
ECHO AV PEAK GRADIENT: 4 MMHG
ECHO AV PEAK VELOCITY: 1.1 M/S
ECHO AV VELOCITY RATIO: 0.55
ECHO AV VTI: 24.4 CM
ECHO BSA: 2.2 M2
ECHO EST RA PRESSURE: 3 MMHG
ECHO LA DIAMETER INDEX: 1.24 CM/M2
ECHO LA DIAMETER: 2.7 CM
ECHO LA TO AORTIC ROOT RATIO: 0.71
ECHO LA VOL A-L A4C: 30 ML (ref 18–58)
ECHO LA VOL MOD A4C: 26 ML (ref 18–58)
ECHO LA VOLUME INDEX A-L A4C: 14 ML/M2 (ref 16–34)
ECHO LA VOLUME INDEX MOD A4C: 12 ML/M2 (ref 16–34)
ECHO LV E' LATERAL VELOCITY: 5.57 CM/S
ECHO LV E' SEPTAL VELOCITY: 4.92 CM/S
ECHO LV EF PHYSICIAN: 60 %
ECHO LV FRACTIONAL SHORTENING: 22 % (ref 28–44)
ECHO LV INTERNAL DIMENSION DIASTOLE INDEX: 2.25 CM/M2
ECHO LV INTERNAL DIMENSION DIASTOLIC: 4.9 CM (ref 4.2–5.9)
ECHO LV INTERNAL DIMENSION SYSTOLIC INDEX: 1.74 CM/M2
ECHO LV INTERNAL DIMENSION SYSTOLIC: 3.8 CM
ECHO LV IVSD: 1 CM (ref 0.6–1)
ECHO LV MASS 2D: 188.1 G (ref 88–224)
ECHO LV MASS INDEX 2D: 86.3 G/M2 (ref 49–115)
ECHO LV POSTERIOR WALL DIASTOLIC: 1.1 CM (ref 0.6–1)
ECHO LV RELATIVE WALL THICKNESS RATIO: 0.45
ECHO LVOT AREA: 3.1 CM2
ECHO LVOT AV VTI INDEX: 0.52
ECHO LVOT DIAM: 2 CM
ECHO LVOT MEAN GRADIENT: 1 MMHG
ECHO LVOT PEAK GRADIENT: 2 MMHG
ECHO LVOT PEAK VELOCITY: 0.6 M/S
ECHO LVOT STROKE VOLUME INDEX: 18.4 ML/M2
ECHO LVOT SV: 40.2 ML
ECHO LVOT VTI: 12.8 CM
ECHO MV A VELOCITY: 0.57 M/S
ECHO MV E DECELERATION TIME (DT): 360.8 MS
ECHO MV E VELOCITY: 0.44 M/S
ECHO MV E/A RATIO: 0.77
ECHO MV E/E' LATERAL: 7.9
ECHO MV E/E' RATIO (AVERAGED): 8.42
ECHO MV E/E' SEPTAL: 8.94
ECHO PV MAX VELOCITY: 0.8 M/S
ECHO PV PEAK GRADIENT: 2 MMHG
ECHO RV FREE WALL PEAK S': 15.8 CM/S
ECHO RV INTERNAL DIMENSION: 5.3 CM
ECHO RV TAPSE: 3.1 CM (ref 1.7–?)
EKG ATRIAL RATE: 54 BPM
EKG ATRIAL RATE: 55 BPM
EKG DIAGNOSIS: NORMAL
EKG DIAGNOSIS: NORMAL
EKG P AXIS: 38 DEGREES
EKG P AXIS: 66 DEGREES
EKG P-R INTERVAL: 164 MS
EKG P-R INTERVAL: 164 MS
EKG Q-T INTERVAL: 420 MS
EKG Q-T INTERVAL: 446 MS
EKG QRS DURATION: 76 MS
EKG QRS DURATION: 80 MS
EKG QTC CALCULATION (BAZETT): 398 MS
EKG QTC CALCULATION (BAZETT): 426 MS
EKG R AXIS: -10 DEGREES
EKG R AXIS: 33 DEGREES
EKG T AXIS: -7 DEGREES
EKG T AXIS: 48 DEGREES
EKG VENTRICULAR RATE: 54 BPM
EKG VENTRICULAR RATE: 55 BPM
ERYTHROCYTE [DISTWIDTH] IN BLOOD BY AUTOMATED COUNT: 15.7 % (ref 11.5–14.5)
GLUCOSE SERPL-MCNC: 84 MG/DL (ref 65–100)
HCT VFR BLD AUTO: 35 % (ref 36.6–50.3)
HGB BLD-MCNC: 11 G/DL (ref 12.1–17)
MCH RBC QN AUTO: 30.2 PG (ref 26–34)
MCHC RBC AUTO-ENTMCNC: 31.4 G/DL (ref 30–36.5)
MCV RBC AUTO: 96.2 FL (ref 80–99)
NRBC # BLD: 0 K/UL (ref 0–0.01)
NRBC BLD-RTO: 0 PER 100 WBC
PLATELET # BLD AUTO: 247 K/UL (ref 150–400)
PMV BLD AUTO: 9.7 FL (ref 8.9–12.9)
POTASSIUM SERPL-SCNC: 3.8 MMOL/L (ref 3.5–5.1)
RBC # BLD AUTO: 3.64 M/UL (ref 4.1–5.7)
SODIUM SERPL-SCNC: 142 MMOL/L (ref 136–145)
WBC # BLD AUTO: 3.1 K/UL (ref 4.1–11.1)

## 2025-07-28 PROCEDURE — 76770 US EXAM ABDO BACK WALL COMP: CPT

## 2025-07-28 PROCEDURE — 6370000000 HC RX 637 (ALT 250 FOR IP): Performed by: STUDENT IN AN ORGANIZED HEALTH CARE EDUCATION/TRAINING PROGRAM

## 2025-07-28 PROCEDURE — 85027 COMPLETE CBC AUTOMATED: CPT

## 2025-07-28 PROCEDURE — 93010 ELECTROCARDIOGRAM REPORT: CPT | Performed by: STUDENT IN AN ORGANIZED HEALTH CARE EDUCATION/TRAINING PROGRAM

## 2025-07-28 PROCEDURE — 2500000003 HC RX 250 WO HCPCS: Performed by: STUDENT IN AN ORGANIZED HEALTH CARE EDUCATION/TRAINING PROGRAM

## 2025-07-28 PROCEDURE — 6370000000 HC RX 637 (ALT 250 FOR IP): Performed by: INTERNAL MEDICINE

## 2025-07-28 PROCEDURE — 93306 TTE W/DOPPLER COMPLETE: CPT

## 2025-07-28 PROCEDURE — 73030 X-RAY EXAM OF SHOULDER: CPT

## 2025-07-28 PROCEDURE — 93306 TTE W/DOPPLER COMPLETE: CPT | Performed by: INTERNAL MEDICINE

## 2025-07-28 PROCEDURE — 1100000000 HC RM PRIVATE

## 2025-07-28 PROCEDURE — 80048 BASIC METABOLIC PNL TOTAL CA: CPT

## 2025-07-28 PROCEDURE — 99223 1ST HOSP IP/OBS HIGH 75: CPT | Performed by: INTERNAL MEDICINE

## 2025-07-28 PROCEDURE — 92610 EVALUATE SWALLOWING FUNCTION: CPT

## 2025-07-28 PROCEDURE — 93005 ELECTROCARDIOGRAM TRACING: CPT | Performed by: EMERGENCY MEDICINE

## 2025-07-28 PROCEDURE — 6360000002 HC RX W HCPCS: Performed by: INTERNAL MEDICINE

## 2025-07-28 PROCEDURE — 36415 COLL VENOUS BLD VENIPUNCTURE: CPT

## 2025-07-28 RX ORDER — ACETAMINOPHEN 325 MG/1
650 TABLET ORAL EVERY 6 HOURS
Status: DISCONTINUED | OUTPATIENT
Start: 2025-07-28 | End: 2025-07-31 | Stop reason: HOSPADM

## 2025-07-28 RX ORDER — KETOROLAC TROMETHAMINE 15 MG/ML
15 INJECTION, SOLUTION INTRAMUSCULAR; INTRAVENOUS EVERY 6 HOURS
Status: DISCONTINUED | OUTPATIENT
Start: 2025-07-28 | End: 2025-07-28

## 2025-07-28 RX ORDER — AMLODIPINE BESYLATE 5 MG/1
5 TABLET ORAL ONCE
Status: COMPLETED | OUTPATIENT
Start: 2025-07-28 | End: 2025-07-28

## 2025-07-28 RX ORDER — PANTOPRAZOLE SODIUM 40 MG/1
40 TABLET, DELAYED RELEASE ORAL
Status: DISCONTINUED | OUTPATIENT
Start: 2025-07-28 | End: 2025-07-31 | Stop reason: HOSPADM

## 2025-07-28 RX ORDER — AMLODIPINE BESYLATE 5 MG/1
5 TABLET ORAL ONCE
Status: DISCONTINUED | OUTPATIENT
Start: 2025-07-28 | End: 2025-07-28

## 2025-07-28 RX ORDER — AMLODIPINE BESYLATE 5 MG/1
10 TABLET ORAL DAILY
Status: DISCONTINUED | OUTPATIENT
Start: 2025-07-29 | End: 2025-07-31 | Stop reason: HOSPADM

## 2025-07-28 RX ADMIN — SODIUM CHLORIDE, PRESERVATIVE FREE 10 ML: 5 INJECTION INTRAVENOUS at 22:14

## 2025-07-28 RX ADMIN — MIRTAZAPINE 15 MG: 15 TABLET, FILM COATED ORAL at 22:12

## 2025-07-28 RX ADMIN — ESCITALOPRAM OXALATE 5 MG: 10 TABLET ORAL at 09:23

## 2025-07-28 RX ADMIN — GABAPENTIN 400 MG: 100 CAPSULE ORAL at 09:23

## 2025-07-28 RX ADMIN — APIXABAN 5 MG: 5 TABLET, FILM COATED ORAL at 09:23

## 2025-07-28 RX ADMIN — KETOROLAC TROMETHAMINE 15 MG: 15 INJECTION, SOLUTION INTRAMUSCULAR; INTRAVENOUS at 09:23

## 2025-07-28 RX ADMIN — DICLOFENAC SODIUM 2 G: 10 GEL TOPICAL at 22:13

## 2025-07-28 RX ADMIN — ACETAMINOPHEN 650 MG: 325 TABLET ORAL at 18:29

## 2025-07-28 RX ADMIN — FINASTERIDE 5 MG: 5 TABLET, FILM COATED ORAL at 09:23

## 2025-07-28 RX ADMIN — ACETAMINOPHEN 650 MG: 325 TABLET ORAL at 12:41

## 2025-07-28 RX ADMIN — ISOSORBIDE MONONITRATE 30 MG: 30 TABLET, EXTENDED RELEASE ORAL at 09:24

## 2025-07-28 RX ADMIN — ASPIRIN 81 MG: 81 TABLET, CHEWABLE ORAL at 09:23

## 2025-07-28 RX ADMIN — DOCUSATE SODIUM 50MG AND SENNOSIDES 8.6MG 1 TABLET: 8.6; 5 TABLET, FILM COATED ORAL at 22:12

## 2025-07-28 RX ADMIN — SODIUM CHLORIDE, PRESERVATIVE FREE 5 ML: 5 INJECTION INTRAVENOUS at 12:42

## 2025-07-28 RX ADMIN — PANTOPRAZOLE SODIUM 40 MG: 40 TABLET, DELAYED RELEASE ORAL at 09:23

## 2025-07-28 RX ADMIN — AMLODIPINE BESYLATE 5 MG: 5 TABLET ORAL at 16:05

## 2025-07-28 RX ADMIN — AMLODIPINE BESYLATE 5 MG: 5 TABLET ORAL at 09:23

## 2025-07-28 RX ADMIN — DICLOFENAC SODIUM 2 G: 10 GEL TOPICAL at 16:06

## 2025-07-28 RX ADMIN — GABAPENTIN 400 MG: 100 CAPSULE ORAL at 16:00

## 2025-07-28 RX ADMIN — Medication 6 MG: at 22:12

## 2025-07-28 RX ADMIN — ATORVASTATIN CALCIUM 80 MG: 20 TABLET, FILM COATED ORAL at 22:12

## 2025-07-28 RX ADMIN — TAMSULOSIN HYDROCHLORIDE 0.4 MG: 0.4 CAPSULE ORAL at 22:12

## 2025-07-28 RX ADMIN — APIXABAN 5 MG: 5 TABLET, FILM COATED ORAL at 22:12

## 2025-07-28 RX ADMIN — GABAPENTIN 400 MG: 100 CAPSULE ORAL at 22:12

## 2025-07-28 ASSESSMENT — PAIN SCALES - GENERAL: PAINLEVEL_OUTOF10: 5

## 2025-07-28 ASSESSMENT — PAIN DESCRIPTION - LOCATION: LOCATION: ABDOMEN

## 2025-07-28 ASSESSMENT — PAIN DESCRIPTION - ORIENTATION: ORIENTATION: LEFT

## 2025-07-28 ASSESSMENT — PAIN DESCRIPTION - DESCRIPTORS: DESCRIPTORS: ACHING

## 2025-07-28 NOTE — ED NOTES
TRANSFER - OUT REPORT:    Verbal report given to Sarah SNOWDEN on Taj Clemens  being transferred to  524 for routine progression of patient care       Report consisted of patient's Situation, Background, Assessment and   Recommendations(SBAR).     Information from the following report(s) ED SBAR was reviewed with the receiving nurse.    Guy Fall Assessment:    Presents to emergency department  because of falls (Syncope, seizure, or loss of consciousness): No  Age > 70: No  Altered Mental Status, Intoxication with alcohol or substance confusion (Disorientation, impaired judgment, poor safety awaremess, or inability to follow instructions): Yes  Impaired Mobility: Ambulates or transfers with assistive devices or assistance; Unable to ambulate or transer.: Yes  Nursing Judgement: Yes          Lines:   Peripheral IV 07/27/25 Right;Anterior Forearm (Active)       Peripheral IV 07/27/25 Distal;Left;Anterior Forearm (Active)        Opportunity for questions and clarification was provided.      Patient transported with: cardiac monitor Reasessment at this time is pt having higher blood pressure then normal Dr Palomo made aware via perfect serve.

## 2025-07-28 NOTE — CARE COORDINATION
Care Management Initial Assessment  7/28/2025 11:07 AM  If patient is discharged prior to next notation, then this note serves as note for discharge by case management.    Reason for Admission:   Bradycardia [R00.1]         Patient Admission Status: Inpatient  Date Admitted to IN: 7/27  RUR: Readmission Risk Score: 17.5    Hospitalization in the last 30 days (Readmission):  No        Advance Care Planning:  Code Status: Full Code  Primary Healthcare Decision Maker: (P) Legal Next of Kin   Advance Directive: has NO advanced directive - not interested in additional information     __________________________________________________________________________  Assessment:      07/28/25 1102   Service Assessment   Patient Orientation Alert and Oriented   Cognition Alert   History Provided By Patient;Significant Other;Medical Record   Primary Caregiver Other (Comment)  (facility staff)   Accompanied By/Relationship significant other   Support Systems Spouse/Significant Other;Family Members;Friends/Neighbors   Patient's Healthcare Decision Maker is: Legal Next of Kin   PCP Verified by CM Yes  (Dr Carolee Murphy)   Last Visit to PCP Within last 3 months   Prior Functional Level Assistance with the following:;Bathing;Dressing;Toileting;Cooking;Housework;Shopping;Mobility   Current Functional Level Assistance with the following:;Bathing;Toileting;Dressing;Cooking;Housework;Shopping;Mobility   Can patient return to prior living arrangement Yes   Ability to make needs known: Good   Family able to assist with home care needs: No   Would you like for me to discuss the discharge plan with any other family members/significant others, and if so, who? No   Financial Resources Medicare;Medicaid;Other (Comment)  [place]   Community Resources None   Social/Functional History   Lives With Other (Comment)  (LTC)   Type of Home Facility   Home Layout One level   Home Access Ramped entrance   Bathroom Toilet

## 2025-07-28 NOTE — PROGRESS NOTES
Speech LAnguage Pathology EVALUATION/DISCHARGE    Patient: Taj Clemens (65 y.o. male)  Date: 7/28/2025  Primary Diagnosis: Bradycardia [R00.1]  Acute coronary syndrome (HCC) [I24.9]  Chest pain, unspecified type [R07.9]       Precautions:        aspiration             ASSESSMENT :  Patient with functional swallow.   He has chronic mild aspiration risk due to h/o major L MCA CVA and preference for laying down instead of sitting up to eat.   Admitted chest pain and bradycardia. CTA: negative.   Head CT: L Wallerian degeneration.  From CVA 1 year ago .   LTC, severe aphasia/apraxia.  , CHF, afib, HTN, HLD.     Patient will be discharged from skilled speech-language pathology services at this time.     PLAN :  Recommendations and Planned Interventions:  Diet: Regular and thin liquids  Upright as much as possible for eating.        Acute SLP Services: No, patient will be discharged from acute skilled speech-language pathology at this time.  Discharge Recommendations: No, additional SLP treatment not indicated at discharge     SUBJECTIVE:   Patient irrirated that SLP needed to complete swallow evaluation. .”    OBJECTIVE:     Past Medical History:   Diagnosis Date    Allergic rhinitis 11/6/2017    Atrial fibrillation (HCC)     Constipation     Hemorrhoids     HTN (hypertension) 12/13/2012    Hypertension     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     Past Surgical History:   Procedure Laterality Date    EEG RAPID  4/27/2025    HEMORRHOID SURGERY  1993    IR MECHANICAL ART THROMBECTOMY INTRACRANIAL  3/24/2024    IR MECHANICAL ART THROMBECTOMY INTRACRANIAL 3/24/2024 [facility] RAD ANGIO IR     Prior Level of Function/Home Situation:   Social/Functional History  Lives With: Other (Comment) (LTC)  Type of Home: Facility  Home Layout: One level  Home Access: Ramped entrance  Bathroom Toilet: Bedside commode  Bathroom Equipment: Grab bars in shower, 3-in-1  prachi present at bedside and verbalized understanding to education provided.     The patient's plan of care including recommendations, planned interventions, and recommended diet changes were discussed with: Registered nurse    Patient/family have participated as able in goal setting and plan of care and Patient/family agree to work toward stated goals and plan of care    Thank you,  Rosa Aguirre, SLP    SLP Individual Minutes  Time In: 1530  Time Out: 1545  Minutes: 15

## 2025-07-28 NOTE — ED NOTES
ISI Gordon assumed care of patient. Patient stable, no acute signs of distress at this time. Respirations unlabored. Visitor at bedside.

## 2025-07-28 NOTE — PROGRESS NOTES
[facility]  [address] [place] [address] [phone]        Hospitalist Progress Note      NAME: Taj Clemens   :  1960  MRM:  [id number]    Date/Time: 2025  3:03 PM         Subjective:     Chief Complaint: \"My chest and shoulder hurt\"     Pt seen and examined. GF at bedside. Discussed negative CTA, cardiac work-up.     ROS:  (bold if positive, if negative)    Shoulder pain   Chest wall pain        Objective:       Vitals:          Last 24hrs VS reviewed since prior progress note. Most recent are:    Vitals:    25 1248   BP: (!) 173/97   Pulse: 54   Resp:    Temp: 97.8 °F (36.6 °C)   SpO2: 98%     SpO2 Readings from Last 6 Encounters:   25 98%   25 95%   25 97%   25 99%   25 98%   25 99%          Intake/Output Summary (Last 24 hours) at 2025 1503  Last data filed at 2025 0431  Gross per 24 hour   Intake --   Output 250 ml   Net -250 ml          Exam:     Physical Exam:    Gen: NAD   HEENT:  Pink conjunctivae, PERRL, hearing intact to voice, moist mucous membranes  Neck:  Supple, without masses, thyroid non-tender  Resp:  No accessory muscle use, clear breath sounds without wheezes rales or rhonchi  Card:  No murmurs, normal S1, S2 without thrills, bruits or peripheral edema  Abd:  Soft, non-tender, non-distended, normoactive bowel sounds are present  Musc:  No cyanosis or clubbing  Skin:  No rashes or ulcers, skin turgor is good  Neuro: R sided hemiparesis. Aphasia   Psych: difficult to determine insight   Neg empty cup sign   Chest wall with reproducible tenderness     Medications Reviewed: (see below)    Lab Data Reviewed: (see below)    ______________________________________________________________________    Medications:     Current Facility-Administered Medications   Medication Dose Route Frequency    pantoprazole (PROTONIX) tablet 40 mg  40 mg Oral QAM AC    acetaminophen (TYLENOL) tablet 650 mg

## 2025-07-28 NOTE — ED NOTES
Patient brief changed by ISI Evangelista and ISI Chávez. Patient has been wiped down and gown has been changed. Patient provided with new linen at this time.    Patient stable, no acute signs of distress at this time.

## 2025-07-28 NOTE — PROGRESS NOTES
Limited echo complete, patients wife refused contrast until she could speak with neurology and cardiology.

## 2025-07-28 NOTE — CONSULTS
[facility] CARDIOLOGY                    Cardiology Care Note     [x]Initial Encounter     []Follow-up    Patient Name: Taj Clemens - :1960 - MRN:[id number]  Primary Cardiologist: Viridiana Gillis MD  Consulting Cardiologist: Gael Valentine MD     Reason for encounter: Chest pain, Bradycardia    HPI:   Taj Clemens is a 65 y.o. male with PMH significant for Afib, CHF, History of CVA, history of LV dysfunction with EF of 25% in the past which has progressively improved.,  Dyslipidemia who is here with symptoms of chest pain however on description it is more of an abdominal pain.  Patient had taken Tylenol which did not help completely resolve the pain.  No symptoms of shortness of breath, lightheadedness, dizziness, presyncope or syncope.  On presentation his heart rate was noted to be in the 40s.      EKG: Sinus no, v rate 55, normal axis, normal ST    Most recent HS troponins:  Recent Labs     25  1607 25  1718 25  2051   TROPHS 7 8 7        Assessment and Plan     Chest pain: Per patient there is no chest pain.  He is complaining of left upper abdominal pain.  On examination he has mild tenderness of the left upper quadrant.  EKG is nonischemic.  Troponins are normal.  Echocardiogram demonstrate LVEF of 60% without any regional wall motion abnormalities.  No further cardiac evaluation is indicated at this time.    Bradycardia: Admission EKG demonstrated heart rate of 55 bpm.  Telemetry demonstrated lowest heart rate in mid 40s.  Patient is asymptomatic.  Vitals are stable.  We will hold and discontinue carvedilol.  Avoid AV jesusita blocking agents.  Atrial fibrillation paroxysmal: Continue apixaban.    CHF: Has known history of EF in 25% range which is now improved to 60% range.  Patient is euvolemic.  HTN: Continue amlodipine, added isosorbide mononitrate 30 mg p.o. daily.  Discontinue carvedilol.  History of CVA    No further cardiac recommendations.  Will sign off.  He will  acute findings      Electronically signed by Brayan Bragg      CT Result (most recent):  CTA CHEST W WO CONTRAST 07/27/2025    Narrative  EXAM:  CTA CHEST W WO CONTRAST    INDICATION: Chest pain x1 day. Evaluate for pulmonary embolus.    COMPARISON: None.    TECHNIQUE: Helical thin section chest CT following intravenous administration of  nonionic contrast 100 mL of isovue 370 according to departmental PE protocol.  Coronal and sagittal reformats were performed. 3D post processing was performed.  CT dose reduction was achieved through the use of a standardized protocol  tailored for this examination and automatic exposure control for dose  modulation.    FINDINGS: This is a good quality study for the evaluation of pulmonary embolism  to the first subsegmental arterial level. There is no pulmonary embolism to this  level.  MEDIASTINUM: No mass or lymphadenopathy.  JO: No mass or lymphadenopathy.  THORACIC AORTA: No aneurysm.  HEART: Normal in size.  ESOPHAGUS: No wall thickening or dilatation.  TRACHEA/BRONCHI: Patent.  PLEURA: No effusion or pneumothorax.  LUNGS: Focal atelectasis posterior to the heart in the posterior costophrenic  angle. Otherwise clear.  UPPER ABDOMEN: Partially imaged. No acute pathology. Intraluminal calcified  gallstone.  BONES: Mild rightward convex thoracic scoliosis. Mild degenerative spine change.  No acute fracture or aggressive lesion.    Impression  No pulmonary embolus or other acute cardiopulmonary process.      Electronically signed by Brayan Bragg      Lab Results   Component Value Date/Time     07/28/2025 05:56 AM    K 3.8 07/28/2025 05:56 AM     07/28/2025 05:56 AM    CO2 27 07/28/2025 05:56 AM    BUN 10 07/28/2025 05:56 AM    CREATININE 0.73 07/28/2025 05:56 AM    GLUCOSE 84 07/28/2025 05:56 AM    CALCIUM 9.0 07/28/2025 05:56 AM    LABGLOM >90 07/28/2025 05:56 AM    LABGLOM >90 03/28/2024 01:05 AM      Lab Results   Component Value Date    BNP 22 03/31/2021

## 2025-07-29 LAB
GLUCOSE BLD STRIP.AUTO-MCNC: 119 MG/DL (ref 65–117)
SERVICE CMNT-IMP: ABNORMAL
TROPONIN T SERPL HS-MCNC: 35.8 NG/L (ref 0–22)

## 2025-07-29 PROCEDURE — 82962 GLUCOSE BLOOD TEST: CPT

## 2025-07-29 PROCEDURE — 6370000000 HC RX 637 (ALT 250 FOR IP): Performed by: INTERNAL MEDICINE

## 2025-07-29 PROCEDURE — 6370000000 HC RX 637 (ALT 250 FOR IP): Performed by: STUDENT IN AN ORGANIZED HEALTH CARE EDUCATION/TRAINING PROGRAM

## 2025-07-29 PROCEDURE — 80048 BASIC METABOLIC PNL TOTAL CA: CPT

## 2025-07-29 PROCEDURE — 94761 N-INVAS EAR/PLS OXIMETRY MLT: CPT

## 2025-07-29 PROCEDURE — 82607 VITAMIN B-12: CPT

## 2025-07-29 PROCEDURE — 97112 NEUROMUSCULAR REEDUCATION: CPT

## 2025-07-29 PROCEDURE — 83735 ASSAY OF MAGNESIUM: CPT

## 2025-07-29 PROCEDURE — 97535 SELF CARE MNGMENT TRAINING: CPT

## 2025-07-29 PROCEDURE — 97165 OT EVAL LOW COMPLEX 30 MIN: CPT

## 2025-07-29 PROCEDURE — 97530 THERAPEUTIC ACTIVITIES: CPT

## 2025-07-29 PROCEDURE — 84484 ASSAY OF TROPONIN QUANT: CPT

## 2025-07-29 PROCEDURE — 1100000000 HC RM PRIVATE

## 2025-07-29 PROCEDURE — 2500000003 HC RX 250 WO HCPCS: Performed by: STUDENT IN AN ORGANIZED HEALTH CARE EDUCATION/TRAINING PROGRAM

## 2025-07-29 PROCEDURE — 97163 PT EVAL HIGH COMPLEX 45 MIN: CPT

## 2025-07-29 PROCEDURE — 36415 COLL VENOUS BLD VENIPUNCTURE: CPT

## 2025-07-29 PROCEDURE — 93005 ELECTROCARDIOGRAM TRACING: CPT | Performed by: EMERGENCY MEDICINE

## 2025-07-29 RX ORDER — ESCITALOPRAM OXALATE 10 MG/1
10 TABLET ORAL DAILY
Status: DISCONTINUED | OUTPATIENT
Start: 2025-07-30 | End: 2025-07-31 | Stop reason: HOSPADM

## 2025-07-29 RX ORDER — ISOSORBIDE MONONITRATE 30 MG/1
30 TABLET, EXTENDED RELEASE ORAL ONCE
Status: COMPLETED | OUTPATIENT
Start: 2025-07-29 | End: 2025-07-29

## 2025-07-29 RX ORDER — OXYCODONE HYDROCHLORIDE 5 MG/1
5 TABLET ORAL EVERY 4 HOURS PRN
Refills: 0 | Status: DISCONTINUED | OUTPATIENT
Start: 2025-07-29 | End: 2025-07-31 | Stop reason: HOSPADM

## 2025-07-29 RX ORDER — LIDOCAINE 4 G/G
1 PATCH TOPICAL DAILY
Status: DISCONTINUED | OUTPATIENT
Start: 2025-07-29 | End: 2025-07-31 | Stop reason: HOSPADM

## 2025-07-29 RX ORDER — OXYCODONE HYDROCHLORIDE 5 MG/1
5 TABLET ORAL ONCE
Refills: 0 | Status: COMPLETED | OUTPATIENT
Start: 2025-07-29 | End: 2025-07-29

## 2025-07-29 RX ORDER — ISOSORBIDE MONONITRATE 30 MG/1
60 TABLET, EXTENDED RELEASE ORAL DAILY
Status: DISCONTINUED | OUTPATIENT
Start: 2025-07-30 | End: 2025-07-31 | Stop reason: HOSPADM

## 2025-07-29 RX ADMIN — MIRTAZAPINE 15 MG: 15 TABLET, FILM COATED ORAL at 21:03

## 2025-07-29 RX ADMIN — SODIUM CHLORIDE, PRESERVATIVE FREE 10 ML: 5 INJECTION INTRAVENOUS at 21:10

## 2025-07-29 RX ADMIN — DOCUSATE SODIUM 50MG AND SENNOSIDES 8.6MG 1 TABLET: 8.6; 5 TABLET, FILM COATED ORAL at 21:03

## 2025-07-29 RX ADMIN — APIXABAN 5 MG: 5 TABLET, FILM COATED ORAL at 21:03

## 2025-07-29 RX ADMIN — ASPIRIN 81 MG: 81 TABLET, CHEWABLE ORAL at 08:12

## 2025-07-29 RX ADMIN — APIXABAN 5 MG: 5 TABLET, FILM COATED ORAL at 08:11

## 2025-07-29 RX ADMIN — ISOSORBIDE MONONITRATE 30 MG: 30 TABLET, EXTENDED RELEASE ORAL at 08:11

## 2025-07-29 RX ADMIN — ACETAMINOPHEN 650 MG: 325 TABLET ORAL at 14:34

## 2025-07-29 RX ADMIN — ACETAMINOPHEN 650 MG: 325 TABLET ORAL at 17:16

## 2025-07-29 RX ADMIN — OXYCODONE 5 MG: 5 TABLET ORAL at 18:21

## 2025-07-29 RX ADMIN — GABAPENTIN 400 MG: 100 CAPSULE ORAL at 21:03

## 2025-07-29 RX ADMIN — DICLOFENAC SODIUM 2 G: 10 GEL TOPICAL at 08:12

## 2025-07-29 RX ADMIN — ATORVASTATIN CALCIUM 80 MG: 20 TABLET, FILM COATED ORAL at 21:02

## 2025-07-29 RX ADMIN — ACETAMINOPHEN 650 MG: 325 TABLET ORAL at 07:02

## 2025-07-29 RX ADMIN — ISOSORBIDE MONONITRATE 30 MG: 30 TABLET, EXTENDED RELEASE ORAL at 18:47

## 2025-07-29 RX ADMIN — FINASTERIDE 5 MG: 5 TABLET, FILM COATED ORAL at 08:11

## 2025-07-29 RX ADMIN — PANTOPRAZOLE SODIUM 40 MG: 40 TABLET, DELAYED RELEASE ORAL at 07:02

## 2025-07-29 RX ADMIN — TAMSULOSIN HYDROCHLORIDE 0.4 MG: 0.4 CAPSULE ORAL at 21:03

## 2025-07-29 RX ADMIN — GABAPENTIN 400 MG: 100 CAPSULE ORAL at 08:11

## 2025-07-29 RX ADMIN — ESCITALOPRAM OXALATE 5 MG: 10 TABLET ORAL at 08:11

## 2025-07-29 RX ADMIN — Medication 6 MG: at 21:04

## 2025-07-29 RX ADMIN — GABAPENTIN 400 MG: 100 CAPSULE ORAL at 14:34

## 2025-07-29 RX ADMIN — OXYCODONE 5 MG: 5 TABLET ORAL at 10:48

## 2025-07-29 RX ADMIN — AMLODIPINE BESYLATE 10 MG: 5 TABLET ORAL at 08:10

## 2025-07-29 RX ADMIN — DICLOFENAC SODIUM 2 G: 10 GEL TOPICAL at 21:14

## 2025-07-29 ASSESSMENT — PAIN DESCRIPTION - ORIENTATION: ORIENTATION: RIGHT

## 2025-07-29 ASSESSMENT — PAIN DESCRIPTION - LOCATION: LOCATION: BACK;CHEST;ABDOMEN

## 2025-07-29 ASSESSMENT — PAIN DESCRIPTION - DESCRIPTORS: DESCRIPTORS: ACHING;BURNING

## 2025-07-29 NOTE — PLAN OF CARE
Problem: Safety - Adult  Goal: Free from fall injury  Outcome: Progressing     Problem: Chronic Conditions and Co-morbidities  Goal: Patient's chronic conditions and co-morbidity symptoms are monitored and maintained or improved  Outcome: Progressing     Problem: Pain  Goal: Verbalizes/displays adequate comfort level or baseline comfort level  Outcome: Progressing     Problem: Skin/Tissue Integrity  Goal: Skin integrity remains intact  Outcome: Progressing     Problem: ABCDS Injury Assessment  Goal: Absence of physical injury  Outcome: Progressing

## 2025-07-29 NOTE — PROGRESS NOTES
Notified by RN re: MARTHA. EKG obtained. No e/o acute ischemia. Qtc prolonged. Will place pt on tele, check K, Mg and cycle CE's. As BP still remains elevated despite adjustment in amlodipine, will increase Imdur to 60mg (30mg dose now).

## 2025-07-29 NOTE — PLAN OF CARE
Problem: Occupational Therapy - Adult  Goal: By Discharge: Performs self-care activities at highest level of function for planned discharge setting.  See evaluation for individualized goals.  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  Patient to [facility] inpatient rehab, followed by [facility] rehab.  Per patient spouse, patient with long difficulty with kidney stones with surgery in June of 2025.  Due to pain and procedures, patient therapy has been limited.  Patient and and spouse now interested in therapy progression.      Receives Help From: Other (Comment), Prior Level of Assist for ADLs: Needs assistance, Bath: Maximum assistance, Dressing: Maximum assistance, Grooming: Maximum assistance, Feeding: Modified independent , Toileting: Needs assistance,  , Ambulation Assistance: Non-ambulatory, Prior Level of Assist for Transfers: Needs assistance, Active : No     HOME SUPPORT: Patient lived with spouse prior prior to March 2024, now in LTC facility.    Occupational Therapy Goals:  Initiated 7/29/2025  1. Patient will perform grooming, in unsupported sitting, with minimal assistance  within 7 day(s).  2. Patient will perform upper body dressing with Moderate Assist using violet technique within 7 day(s).  3. Patient will perform anterior bathing from neck to knees, seated, with Moderate Assist within 7 day(s).  5. Patient will perform rolling in bed with moderate  assistance to assist with aspects of toileting within 7 day(s).  6. Patient will participate in self-assisted upper extremity therapeutic exercise/activities/ROM with Supervision for 10 minutes within 7 day(s).     Outcome: Progressing        OCCUPATIONAL THERAPY EVALUATION    Patient: Taj Clemens (65 y.o. male)  Date: 7/29/2025  Primary Diagnosis: Bradycardia [R00.1]  Acute coronary syndrome (HCC) [I24.9]  Chest pain, unspecified type  Retired      Hand Dominance: right - now using L secondary to effects of CVA    EXAMINATION OF PERFORMANCE DEFICITS:    Cognitive/Behavioral Status:  Orientation  Orientation Level: Unable to assess  Cognition  Overall Cognitive Status: Exceptions  Arousal/Alertness: Delayed responses to stimuli;Impaired  Following Commands: Impaired;Follows one step commands with repetition;Follows multistep commands with increased time  Attention Span: Impaired;Attends with cues to redirect  Safety Judgement: Decreased awareness of need for assistance;Impaired  Problem Solving: Impaired  Insights: Impaired  Initiation: Impaired  Sequencing: Impaired    Skin: intact as seen, bilateral feet , dry, scaly    Edema: none noted     Hearing:    WFL          Range of Motion:   AROM: Generally decreased, functional (LUE, RUE no active motion)  PROM: Within functional limits (LUE, RUE, generally decreased)      Strength:  Strength: Grossly decreased, non-functional (RUE)      Coordination:  Coordination: Generally decreased, functional     Coordination: Grossly decreased, non-functional      Tone & Sensation:   Tone: Abnormal (RUE)             Functional Mobility and Transfers for ADLs:    Bed Mobility:     Bed Mobility Training  Bed Mobility Training: Yes  Overall Level of Assistance: Dependent/Total;2 Person assistance  Interventions: Manual cues;Safety awareness training;Tactile cues;Verbal cues;Visual cues;Weight shifting training/pressure relief  Rollin Person assistance;Dependent/Total  Supine to Sit: Substantial/Maximal assistance;2 Person assistance  Sit to Supine: Dependent/Total;2 Person assistance  Scooting: Dependent/Total;2 Person assistance    Transfers:      Transfer Training  Transfer Training: No                     Balance:      Balance  Sitting: Impaired  Sitting - Static: Poor (constant support);Fair (occasional)  Sitting - Dynamic: Poor (constant support)  Standing: Impaired  Standing - Static: Poor;Constant

## 2025-07-29 NOTE — PLAN OF CARE
Problem: Physical Therapy - Adult  Goal: By Discharge: Performs mobility at highest level of function for planned discharge setting.  See evaluation for individualized goals.  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  Per his wife he went to [facility] for 2wks and then to [facility] where he now resides.  He is den dependent for transfers to w/ by staff.    HOME SUPPORT PRIOR TO ADMISSION: pt lives in LTC at [facility]; his wife is involved and a strong advocate for him    Physical Therapy Goals  Initiated 7/29/2025  1.  Patient will move from supine to sit and sit to supine in bed with moderate assistance within 7 day(s).    2.  Patient will perform sit to stand with maximal assistance x2 within 7 day(s).  3.  Patient will transfer from bed to chair and chair to bed with maximal assistance x2 using the least restrictive device within 7 day(s).  4.  Patient will demonstrate improved dynamic sitting balance on EOB for functional tasks without LOB for 10min with minimal assistance within 7 day(s).   Outcome: Progressing   PHYSICAL THERAPY EVALUATION    Patient: Taj Clemens (65 y.o. male)  Date: 7/29/2025  Primary Diagnosis: Bradycardia [R00.1]  Acute coronary syndrome (HCC) [I24.9]  Chest pain, unspecified type [R07.9]       Precautions:              ASSESSMENT :   DEFICITS/IMPAIRMENTS:   The patient is limited by decreased functional mobility, independence in ADLs, ROM, strength, sensation, body mechanics, activity tolerance, safety awareness, cognition, command following, attention/concentration, coordination, balance, proprioception, vision/visual deficit.  Pt admitted due to bradycardia with chest pain.  Pt with hx of significant CVA 3/2024 resulting in  RHP(UE/LE), expressive and receptive aphasia.  Per his wife pt went to [facility] for 2wks rehab and then to [facility] for Mod Intensity rehab and LTC.    Based on the

## 2025-07-29 NOTE — PROGRESS NOTES
SUKHJINDER MALIK [facility]  [address] [place] [address] [phone]        Hospitalist Progress Note      NAME: Taj Clemens   :  1960  MRM:  [id number]    Date/Time: 2025  2:15 PM         Subjective:     Chief Complaint: \"I'm okay\"     Pt seen and examined. Still with L chest wall and shoulder pain     ROS:  (bold if positive, if negative)    Shoulder pain   Chest wall pain        Objective:       Vitals:          Last 24hrs VS reviewed since prior progress note. Most recent are:    Vitals:    25 0743   BP: (!) 142/96   Pulse: 57   Resp: 16   Temp: 97.3 °F (36.3 °C)   SpO2: 98%     SpO2 Readings from Last 6 Encounters:   25 98%   25 95%   25 97%   25 99%   25 98%   25 99%        No intake or output data in the 24 hours ending 25 1415         Exam:     Physical Exam:    Gen: NAD   HEENT:  Pink conjunctivae, PERRL, hearing intact to voice, moist mucous membranes  Neck:  Supple, without masses, thyroid non-tender  Resp:  No accessory muscle use, clear breath sounds without wheezes rales or rhonchi  Card:  No murmurs, normal S1, S2 without thrills, bruits or peripheral edema  Abd:  Soft, non-tender, non-distended, normoactive bowel sounds are present  Musc:  No cyanosis or clubbing  Skin:  No rashes or ulcers, skin turgor is good  Neuro: R sided hemiparesis. Aphasia   Psych: difficult to determine insight   Neg empty cup sign   Chest wall with reproducible tenderness     Medications Reviewed: (see below)    Lab Data Reviewed: (see below)    ______________________________________________________________________    Medications:     Current Facility-Administered Medications   Medication Dose Route Frequency    lidocaine 4 % external patch 1 patch  1 patch TransDERmal Daily    oxyCODONE (ROXICODONE) immediate release tablet 5 mg  5 mg Oral Q4H PRN    [START ON 2025] escitalopram (LEXAPRO) tablet 10 mg  10 mg Oral Daily

## 2025-07-29 NOTE — PROGRESS NOTES
Spiritual Care Partner Volunteer visited patient at [facility] in [facility] 2 on 7/29/2025     Documented by:  Xavi Torres MDiv  Staff   Paging Service [phone] (CASI)

## 2025-07-29 NOTE — PROGRESS NOTES
Physician Progress Note      PATIENT:               JOSEFINA VIRGEN  [facility] #:                  [id number]  :                       1960  ADMIT DATE:       2025 3:53 PM  DISCH DATE:  RESPONDING  PROVIDER #:        Ry Collins MD          QUERY TEXT:    Congestive Heart Failure is documented in the medical record ( Dr Valentine).    Please document the type and acuity:    The clinical indicators include:  ECHO 25  -  Left Ventricle: Normal left ventricular systolic function. EF by visual   approximation is 60%. Left ventricle size is normal. Normal wall thickness.   Normal wall motion. Diastolic dysfunction present with normal LV EF.  - Right Ventricle: Right ventricle size is normal. Normal systolic function.    - Dr Valentine cardio: CHF: Has known history of EF in 25% range which is now   improved to 60% range.  Patient is euvolemic.  Options provided:  -- Chronic Diastolic CHF/HFpEF  -- Other - I will add my own diagnosis  -- Disagree - Not applicable / Not valid  -- Disagree - Clinically unable to determine / Unknown  -- Refer to Clinical Documentation Reviewer    PROVIDER RESPONSE TEXT:    This patient has chronic diastolic CHF/HFpEF.    Query created by: TISHA WILSON on 2025 1:47 PM      QUERY TEXT:    Based on your medical judgment, please clarify these findings and document if   any of the following are being evaluated and/or treated:    The clinical indicators include:  65-year-old male with A-fib, HTN, and h/o CVA on Eliquis  Options provided:  -- Secondary hypercoagulable state in a patient with atrial fibrillation  -- Other - I will add my own diagnosis  -- Disagree - Not applicable / Not valid  -- Disagree - Clinically unable to determine / Unknown  -- Refer to Clinical Documentation Reviewer    PROVIDER RESPONSE TEXT:    This patient has secondary hypercoagulable state in a patient with atrial   fibrillation.    Query created by: TISHA WILSON on 2025 1:56  PM      Electronically signed by:  Ry Collins MD 7/29/2025 6:58 PM

## 2025-07-29 NOTE — CARE COORDINATION
Care Management Progress Note    Reason for Admission:   Bradycardia [R00.1]  Acute coronary syndrome (HCC) [I24.9]  Chest pain, unspecified type [R07.9]         Patient Admission Status: Inpatient  RUR:  19%  Hospitalization in the last 30 days (Readmission):  No        EMR reviewed and handoff received from previous  (Ruth).  Reportedly, pt resides at [facility] ([facility])    Transition Plan of Care:  Cardiology following for medical management  ST eval complete; PT/OT evals pending and await recs  Outpatient follow up  Mode of transport to be determined    CM will continue to follow pt for definitive discharge plan.  Janet

## 2025-07-30 LAB
ANION GAP SERPL CALC-SCNC: 10 MMOL/L (ref 2–14)
BUN SERPL-MCNC: 15 MG/DL (ref 8–23)
BUN/CREAT SERPL: 17 (ref 12–20)
CALCIUM SERPL-MCNC: 8.9 MG/DL (ref 8.8–10.2)
CHLORIDE SERPL-SCNC: 106 MMOL/L (ref 98–107)
CO2 SERPL-SCNC: 24 MMOL/L (ref 20–29)
CREAT SERPL-MCNC: 0.89 MG/DL (ref 0.7–1.2)
GLUCOSE SERPL-MCNC: 113 MG/DL (ref 65–100)
MAGNESIUM SERPL-MCNC: 1.9 MG/DL (ref 1.6–2.4)
POTASSIUM SERPL-SCNC: 3.4 MMOL/L (ref 3.5–5.1)
SODIUM SERPL-SCNC: 139 MMOL/L (ref 136–145)
VIT B12 SERPL-MCNC: 1270 PG/ML (ref 232–1245)

## 2025-07-30 PROCEDURE — 2500000003 HC RX 250 WO HCPCS: Performed by: STUDENT IN AN ORGANIZED HEALTH CARE EDUCATION/TRAINING PROGRAM

## 2025-07-30 PROCEDURE — 6370000000 HC RX 637 (ALT 250 FOR IP): Performed by: STUDENT IN AN ORGANIZED HEALTH CARE EDUCATION/TRAINING PROGRAM

## 2025-07-30 PROCEDURE — 97116 GAIT TRAINING THERAPY: CPT

## 2025-07-30 PROCEDURE — 97112 NEUROMUSCULAR REEDUCATION: CPT

## 2025-07-30 PROCEDURE — 97535 SELF CARE MNGMENT TRAINING: CPT

## 2025-07-30 PROCEDURE — 97530 THERAPEUTIC ACTIVITIES: CPT

## 2025-07-30 PROCEDURE — 6370000000 HC RX 637 (ALT 250 FOR IP): Performed by: INTERNAL MEDICINE

## 2025-07-30 PROCEDURE — 1100000000 HC RM PRIVATE

## 2025-07-30 RX ADMIN — FINASTERIDE 5 MG: 5 TABLET, FILM COATED ORAL at 09:04

## 2025-07-30 RX ADMIN — ISOSORBIDE MONONITRATE 60 MG: 30 TABLET, EXTENDED RELEASE ORAL at 09:04

## 2025-07-30 RX ADMIN — DICLOFENAC SODIUM 2 G: 10 GEL TOPICAL at 11:33

## 2025-07-30 RX ADMIN — GABAPENTIN 400 MG: 100 CAPSULE ORAL at 21:20

## 2025-07-30 RX ADMIN — ACETAMINOPHEN 650 MG: 325 TABLET ORAL at 00:10

## 2025-07-30 RX ADMIN — AMLODIPINE BESYLATE 10 MG: 5 TABLET ORAL at 09:04

## 2025-07-30 RX ADMIN — ESCITALOPRAM OXALATE 10 MG: 10 TABLET ORAL at 09:03

## 2025-07-30 RX ADMIN — DICLOFENAC SODIUM 2 G: 10 GEL TOPICAL at 21:19

## 2025-07-30 RX ADMIN — PANTOPRAZOLE SODIUM 40 MG: 40 TABLET, DELAYED RELEASE ORAL at 06:52

## 2025-07-30 RX ADMIN — ATORVASTATIN CALCIUM 80 MG: 20 TABLET, FILM COATED ORAL at 21:20

## 2025-07-30 RX ADMIN — SODIUM CHLORIDE, PRESERVATIVE FREE 10 ML: 5 INJECTION INTRAVENOUS at 09:04

## 2025-07-30 RX ADMIN — GABAPENTIN 400 MG: 100 CAPSULE ORAL at 15:04

## 2025-07-30 RX ADMIN — Medication 6 MG: at 21:20

## 2025-07-30 RX ADMIN — APIXABAN 5 MG: 5 TABLET, FILM COATED ORAL at 21:20

## 2025-07-30 RX ADMIN — ACETAMINOPHEN 650 MG: 325 TABLET ORAL at 11:28

## 2025-07-30 RX ADMIN — ACETAMINOPHEN 650 MG: 325 TABLET ORAL at 18:10

## 2025-07-30 RX ADMIN — MIRTAZAPINE 15 MG: 15 TABLET, FILM COATED ORAL at 21:20

## 2025-07-30 RX ADMIN — SODIUM CHLORIDE, PRESERVATIVE FREE 10 ML: 5 INJECTION INTRAVENOUS at 21:30

## 2025-07-30 RX ADMIN — APIXABAN 5 MG: 5 TABLET, FILM COATED ORAL at 09:04

## 2025-07-30 RX ADMIN — TAMSULOSIN HYDROCHLORIDE 0.4 MG: 0.4 CAPSULE ORAL at 21:19

## 2025-07-30 RX ADMIN — DOCUSATE SODIUM 50MG AND SENNOSIDES 8.6MG 1 TABLET: 8.6; 5 TABLET, FILM COATED ORAL at 21:19

## 2025-07-30 RX ADMIN — ACETAMINOPHEN 650 MG: 325 TABLET ORAL at 06:52

## 2025-07-30 RX ADMIN — GABAPENTIN 400 MG: 100 CAPSULE ORAL at 09:03

## 2025-07-30 RX ADMIN — ASPIRIN 81 MG: 81 TABLET, CHEWABLE ORAL at 09:03

## 2025-07-30 ASSESSMENT — PAIN SCALES - GENERAL
PAINLEVEL_OUTOF10: 0
PAINLEVEL_OUTOF10: 0
PAINLEVEL_OUTOF10: 4

## 2025-07-30 ASSESSMENT — PAIN DESCRIPTION - ORIENTATION: ORIENTATION: LEFT

## 2025-07-30 ASSESSMENT — PAIN DESCRIPTION - LOCATION: LOCATION: ABDOMEN

## 2025-07-30 NOTE — PROGRESS NOTES
SUKHJINDER MAILK [facility][note 3]  [address] [place] [address] [phone]        Hospitalist Progress Note      NAME: Taj Clemens   :  1960  MRM:  [id number]    Date/Time: 2025  12:53 PM         Subjective:     Chief Complaint: \"I'm okay\"     Pt seen and examined. Still with L chest wall and shoulder pain     ROS:  (bold if positive, if negative)    Shoulder pain   Chest wall pain        Objective:       Vitals:          Last 24hrs VS reviewed since prior progress note. Most recent are:    Vitals:    25 0800   BP: 128/79   Pulse: (!) 104   Resp: 18   Temp: 97.5 °F (36.4 °C)   SpO2: 94%     SpO2 Readings from Last 6 Encounters:   25 94%   25 95%   25 97%   25 99%   25 98%   25 99%          Intake/Output Summary (Last 24 hours) at 2025 1253  Last data filed at 2025 0756  Gross per 24 hour   Intake --   Output 300 ml   Net -300 ml            Exam:     Physical Exam:    Gen: NAD   HEENT:  Pink conjunctivae, PERRL, hearing intact to voice, moist mucous membranes  Neck:  Supple, without masses, thyroid non-tender  Resp:  No accessory muscle use, clear breath sounds without wheezes rales or rhonchi  Card:  No murmurs, normal S1, S2 without thrills, bruits or peripheral edema  Abd:  Soft, non-tender, non-distended, normoactive bowel sounds are present  Musc:  No cyanosis or clubbing  Skin:  No rashes or ulcers, skin turgor is good  Neuro: R sided hemiparesis. Aphasia   Psych: difficult to determine insight   Neg empty cup sign   Chest wall with reproducible tenderness     Medications Reviewed: (see below)    Lab Data Reviewed: (see below)    ______________________________________________________________________    Medications:     Current Facility-Administered Medications   Medication Dose Route Frequency    lidocaine 4 % external patch 1 patch  1 patch TransDERmal Daily    oxyCODONE (ROXICODONE) immediate release tablet 5 mg  5 mg

## 2025-07-30 NOTE — CARE COORDINATION
Care Management Progress Note    Reason for Admission:   Bradycardia [R00.1]  Acute coronary syndrome (HCC) [I24.9]  Chest pain, unspecified type [R07.9]         Patient Admission Status: Inpatient  RUR:  19%  Hospitalization in the last 30 days (Readmission):  No        Reportedly, pt resides at [facility] ([facility])     Transition Plan of Care:  Cardiology following for medical management  PT/OT/ST evals complete; pt was depn/total assist x 2 persons on 7/29 and SNF was recommended  SNF - referral was sent to [facility] since pt is from their [facility]  Outpatient follow up  Ambulance transport to be determined     CM will continue to follow pt for SNF  Janet

## 2025-07-30 NOTE — PLAN OF CARE
Problem: Occupational Therapy - Adult  Goal: By Discharge: Performs self-care activities at highest level of function for planned discharge setting.  See evaluation for individualized goals.  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  Patient to [facility] inpatient rehab, followed by [facility] rehab.  Per patient spouse, patient with long difficulty with kidney stones with surgery in June of 2025.  Due to pain and procedures, patient therapy has been limited.  Patient and and spouse now interested in therapy progression.      Receives Help From: Other (Comment), Prior Level of Assist for ADLs: Needs assistance, Bath: Maximum assistance, Dressing: Maximum assistance, Grooming: Maximum assistance, Feeding: Modified independent , Toileting: Needs assistance,  , Ambulation Assistance: Non-ambulatory, Prior Level of Assist for Transfers: Needs assistance, Active : No     HOME SUPPORT: Patient lived with spouse prior prior to March 2024, now in LTC facility.    Occupational Therapy Goals:  Initiated 7/29/2025  1. Patient will perform grooming, in unsupported sitting, with minimal assistance  within 7 day(s).  2. Patient will perform upper body dressing with Moderate Assist using violet technique within 7 day(s).  3. Patient will perform anterior bathing from neck to knees, seated, with Moderate Assist within 7 day(s).  5. Patient will perform rolling in bed with moderate  assistance to assist with aspects of toileting within 7 day(s).  6. Patient will participate in self-assisted upper extremity therapeutic exercise/activities/ROM with Supervision for 10 minutes within 7 day(s).     Outcome: Progressing   OCCUPATIONAL THERAPY TREATMENT  Patient: Taj Clemens (65 y.o. male)  Date: 7/30/2025  Primary Diagnosis: Bradycardia [R00.1]  Acute coronary syndrome (HCC) [I24.9]  Chest pain, unspecified type [R07.9]

## 2025-07-30 NOTE — PLAN OF CARE
Problem: Safety - Adult  Goal: Free from fall injury  Outcome: Progressing     Problem: Chronic Conditions and Co-morbidities  Goal: Patient's chronic conditions and co-morbidity symptoms are monitored and maintained or improved  Outcome: Progressing     Problem: Pain  Goal: Verbalizes/displays adequate comfort level or baseline comfort level  Outcome: Progressing     Problem: Skin/Tissue Integrity  Goal: Skin integrity remains intact  Description: 1.  Monitor for areas of redness and/or skin breakdown  2.  Assess vascular access sites hourly  3.  Every 4-6 hours minimum:  Change oxygen saturation probe site  4.  Every 4-6 hours:  If on nasal continuous positive airway pressure, respiratory therapy assess nares and determine need for appliance change or resting period  Outcome: Progressing     Problem: ABCDS Injury Assessment  Goal: Absence of physical injury  Outcome: Progressing     Problem: ABCDS Injury Assessment  Goal: Absence of physical injury  Outcome: Progressing

## 2025-07-30 NOTE — PLAN OF CARE
Problem: Physical Therapy - Adult  Goal: By Discharge: Performs mobility at highest level of function for planned discharge setting.  See evaluation for individualized goals.  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  Per his wife he went to [facility] for 2wks and then to [facility] where he now resides.  He is den dependent for transfers to / by staff.    HOME SUPPORT PRIOR TO ADMISSION: pt lives in LTC at [facility]; his wife is involved and a strong advocate for him    Physical Therapy Goals  Initiated 7/29/2025  1.  Patient will move from supine to sit and sit to supine in bed with moderate assistance within 7 day(s).    2.  Patient will perform sit to stand with maximal assistance x2 within 7 day(s).  3.  Patient will transfer from bed to chair and chair to bed with maximal assistance x2 using the least restrictive device within 7 day(s).  4.  Patient will demonstrate improved dynamic sitting balance on EOB for functional tasks without LOB for 10min with minimal assistance within 7 day(s).   Outcome: Progressing   PHYSICAL THERAPY TREATMENT    Patient: Taj Clemens (65 y.o. male)  Date: 7/30/2025  Diagnosis: Bradycardia [R00.1]  Acute coronary syndrome (HCC) [I24.9]  Chest pain, unspecified type [R07.9] Bradycardia      Precautions:              ASSESSMENT:  Patient continues to benefit from skilled PT services and is slowly progressing towards goals. Pt received supine in bed.  Willing to participate.  Pt rolling with Mod to Max Ax2 for cleaning due to bowel and bladder incontinence.  Pt assisting self with use of LUE on bed rail.  Max Ax2 with sup to sit with HOB elevated.  Pt found midline quickly sitting on EOB with LUE support.  Performed dynamic unsupported sitting reaching tasks 4/4x's without LOB.  Pt unwilling to do LLE LAQ's due to L hip pain.  Returned to R side lie with log roll with Total Ax2 and

## 2025-07-31 VITALS
BODY MASS INDEX: 28.58 KG/M2 | SYSTOLIC BLOOD PRESSURE: 142 MMHG | HEIGHT: 72 IN | HEART RATE: 70 BPM | WEIGHT: 211 LBS | DIASTOLIC BLOOD PRESSURE: 93 MMHG | RESPIRATION RATE: 16 BRPM | OXYGEN SATURATION: 97 % | TEMPERATURE: 98.6 F

## 2025-07-31 PROBLEM — R00.1 BRADYCARDIA: Status: RESOLVED | Noted: 2025-07-27 | Resolved: 2025-07-31

## 2025-07-31 LAB
ANION GAP SERPL CALC-SCNC: 10 MMOL/L (ref 2–14)
BASOPHILS # BLD: 0.03 K/UL (ref 0–0.1)
BASOPHILS NFR BLD: 0.8 % (ref 0–1)
BUN SERPL-MCNC: 21 MG/DL (ref 8–23)
BUN/CREAT SERPL: 26 (ref 12–20)
CALCIUM SERPL-MCNC: 9 MG/DL (ref 8.8–10.2)
CHLORIDE SERPL-SCNC: 108 MMOL/L (ref 98–107)
CO2 SERPL-SCNC: 24 MMOL/L (ref 20–29)
CREAT SERPL-MCNC: 0.83 MG/DL (ref 0.7–1.2)
DIFFERENTIAL METHOD BLD: ABNORMAL
EOSINOPHIL # BLD: 0.12 K/UL (ref 0–0.4)
EOSINOPHIL NFR BLD: 3 % (ref 0–7)
ERYTHROCYTE [DISTWIDTH] IN BLOOD BY AUTOMATED COUNT: 15.4 % (ref 11.5–14.5)
GLUCOSE SERPL-MCNC: 81 MG/DL (ref 65–100)
HCT VFR BLD AUTO: 34.5 % (ref 36.6–50.3)
HGB BLD-MCNC: 11.2 G/DL (ref 12.1–17)
IMM GRANULOCYTES # BLD AUTO: 0.01 K/UL (ref 0–0.04)
IMM GRANULOCYTES NFR BLD AUTO: 0.3 % (ref 0–0.5)
LYMPHOCYTES # BLD: 2.2 K/UL (ref 0.8–3.5)
LYMPHOCYTES NFR BLD: 55.4 % (ref 12–49)
MCH RBC QN AUTO: 30.6 PG (ref 26–34)
MCHC RBC AUTO-ENTMCNC: 32.5 G/DL (ref 30–36.5)
MCV RBC AUTO: 94.3 FL (ref 80–99)
MONOCYTES # BLD: 0.38 K/UL (ref 0–1)
MONOCYTES NFR BLD: 9.6 % (ref 5–13)
NEUTS SEG # BLD: 1.23 K/UL (ref 1.8–8)
NEUTS SEG NFR BLD: 30.9 % (ref 32–75)
NRBC # BLD: 0 K/UL (ref 0–0.01)
NRBC BLD-RTO: 0 PER 100 WBC
PLATELET # BLD AUTO: 254 K/UL (ref 150–400)
PMV BLD AUTO: 9.3 FL (ref 8.9–12.9)
POTASSIUM SERPL-SCNC: 3.8 MMOL/L (ref 3.5–5.1)
RBC # BLD AUTO: 3.66 M/UL (ref 4.1–5.7)
SODIUM SERPL-SCNC: 141 MMOL/L (ref 136–145)
WBC # BLD AUTO: 4 K/UL (ref 4.1–11.1)

## 2025-07-31 PROCEDURE — 6370000000 HC RX 637 (ALT 250 FOR IP): Performed by: INTERNAL MEDICINE

## 2025-07-31 PROCEDURE — 85025 COMPLETE CBC W/AUTO DIFF WBC: CPT

## 2025-07-31 PROCEDURE — 6370000000 HC RX 637 (ALT 250 FOR IP): Performed by: STUDENT IN AN ORGANIZED HEALTH CARE EDUCATION/TRAINING PROGRAM

## 2025-07-31 PROCEDURE — 97112 NEUROMUSCULAR REEDUCATION: CPT

## 2025-07-31 PROCEDURE — 97535 SELF CARE MNGMENT TRAINING: CPT

## 2025-07-31 PROCEDURE — 80048 BASIC METABOLIC PNL TOTAL CA: CPT

## 2025-07-31 PROCEDURE — 97530 THERAPEUTIC ACTIVITIES: CPT

## 2025-07-31 PROCEDURE — 94761 N-INVAS EAR/PLS OXIMETRY MLT: CPT

## 2025-07-31 RX ORDER — ATORVASTATIN CALCIUM 80 MG/1
80 TABLET, FILM COATED ORAL NIGHTLY
Qty: 30 TABLET | Refills: 3 | Status: SHIPPED | OUTPATIENT
Start: 2025-07-31

## 2025-07-31 RX ORDER — AMLODIPINE BESYLATE 10 MG/1
10 TABLET ORAL DAILY
Qty: 30 TABLET | Refills: 3 | Status: SHIPPED | OUTPATIENT
Start: 2025-08-01

## 2025-07-31 RX ORDER — LIDOCAINE 4 G/G
1 PATCH TOPICAL DAILY
Qty: 15 PATCH | Refills: 0 | Status: SHIPPED | OUTPATIENT
Start: 2025-08-01

## 2025-07-31 RX ORDER — ACETAMINOPHEN 325 MG/1
650 TABLET ORAL EVERY 6 HOURS
Qty: 120 TABLET | Refills: 3 | Status: SHIPPED | OUTPATIENT
Start: 2025-07-31

## 2025-07-31 RX ORDER — PANTOPRAZOLE SODIUM 40 MG/1
40 TABLET, DELAYED RELEASE ORAL
Qty: 30 TABLET | Refills: 3 | Status: SHIPPED | OUTPATIENT
Start: 2025-08-01

## 2025-07-31 RX ADMIN — OXYCODONE 5 MG: 5 TABLET ORAL at 11:07

## 2025-07-31 RX ADMIN — ESCITALOPRAM OXALATE 10 MG: 10 TABLET ORAL at 09:23

## 2025-07-31 RX ADMIN — GABAPENTIN 400 MG: 100 CAPSULE ORAL at 09:22

## 2025-07-31 RX ADMIN — PANTOPRAZOLE SODIUM 40 MG: 40 TABLET, DELAYED RELEASE ORAL at 06:58

## 2025-07-31 RX ADMIN — ACETAMINOPHEN 650 MG: 325 TABLET ORAL at 06:58

## 2025-07-31 RX ADMIN — FINASTERIDE 5 MG: 5 TABLET, FILM COATED ORAL at 09:22

## 2025-07-31 RX ADMIN — ASPIRIN 81 MG: 81 TABLET, CHEWABLE ORAL at 09:23

## 2025-07-31 RX ADMIN — APIXABAN 5 MG: 5 TABLET, FILM COATED ORAL at 09:22

## 2025-07-31 RX ADMIN — GABAPENTIN 400 MG: 100 CAPSULE ORAL at 13:40

## 2025-07-31 RX ADMIN — ISOSORBIDE MONONITRATE 60 MG: 30 TABLET, EXTENDED RELEASE ORAL at 09:23

## 2025-07-31 RX ADMIN — DICLOFENAC SODIUM 2 G: 10 GEL TOPICAL at 09:33

## 2025-07-31 RX ADMIN — AMLODIPINE BESYLATE 10 MG: 5 TABLET ORAL at 09:21

## 2025-07-31 RX ADMIN — ACETAMINOPHEN 650 MG: 325 TABLET ORAL at 13:40

## 2025-07-31 ASSESSMENT — PAIN SCALES - GENERAL
PAINLEVEL_OUTOF10: 9
PAINLEVEL_OUTOF10: 4
PAINLEVEL_OUTOF10: 0

## 2025-07-31 ASSESSMENT — PAIN DESCRIPTION - LOCATION: LOCATION: LEG

## 2025-07-31 ASSESSMENT — PAIN DESCRIPTION - ORIENTATION: ORIENTATION: RIGHT;LEFT

## 2025-07-31 NOTE — PROGRESS NOTES
Report called to receiving facility, attempted to clean up patient, patient and girlfriend refused and said \"he's fine.\" Ivs out, [facility] here to transport patient.

## 2025-07-31 NOTE — CARE COORDINATION
Transition of Care Plan to SNF/Rehab    Pt will return to [facility] but under skilled care.    RN, please call report to [phone].  Pt will go to room 516P.  [facility] stretcher transport has been arranged for 4 p.m today.    Communication to Patient/Family:   Met with patient and family and they are agreeable to the transition plan. The Plan for Transition of Care is related to the following treatment goals: Bradycardia [R00.1]  Acute coronary syndrome (HCC) [I24.9]  Chest pain, unspecified type [R07.9]      The Patient and/or patient representative was provided with a choice of provider and agrees  with the discharge plan.      Yes [x] No []    A Freedom of choice list was provided with basic dialogue that supports the patient's individualized plan of care/goals and shares the quality data associated with the providers.       Yes [x] No []    SNF/Rehab Transition:  Patient has been accepted to [facility] SNF/Rehab and meets criteria for admission.     Medicare 3 night stay satisfied? [x]   Inpatient Admission Dates: 7/27 - 7/31    Patient will be transported by [facility] and expected to leave at 1 p.m.   [x] Packet on chart (if needed)  [] PCS completed (if applicable)     Communication to SNF/Rehab:  Bedside RN, Pedro, has been notified to update the transition plan to the facility and call report (phone number).  Discharge information has been updated on the AVS. And communicated to facility via [employer] ([employer])/All Scripts, or CC link.     Discharge instructions to be fax'd to facility via [x] AllScripts [] CCLink      Nursing Please include all hard scripts for controlled substances, med rec and dc summary, and AVS in packet.       Nursing, please discuss the following applicable information with the facility's nursing during report:     Krzysztof with (X) only those applicable:  Medication:  []Medications are available at the facility  []IV Antibiotics    []Controlled Substance - hard copies available sent.  []Weekly Labs

## 2025-07-31 NOTE — PLAN OF CARE
Problem: Physical Therapy - Adult  Goal: By Discharge: Performs mobility at highest level of function for planned discharge setting.  See evaluation for individualized goals.  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  Per his wife he went to [facility] for 2wks and then to  where he now resides.  He is den dependent for transfers to w/c by staff.    HOME SUPPORT PRIOR TO ADMISSION: pt lives in LTC at ; his wife is involved and a strong advocate for him    Physical Therapy Goals  Initiated 7/29/2025  1.  Patient will move from supine to sit and sit to supine in bed with moderate assistance within 7 day(s).    2.  Patient will perform sit to stand with maximal assistance x2 within 7 day(s).  3.  Patient will transfer from bed to chair and chair to bed with maximal assistance x2 using the least restrictive device within 7 day(s).  4.  Patient will demonstrate improved dynamic sitting balance on EOB for functional tasks without LOB for 10min with minimal assistance within 7 day(s).   Outcome: Progressing   PHYSICAL THERAPY TREATMENT    Patient: Taj Clemens (65 y.o. male)  Date: 7/31/2025  Diagnosis: Bradycardia [R00.1]  Acute coronary syndrome (HCC) [I24.9]  Chest pain, unspecified type [R07.9] Bradycardia      Precautions:              ASSESSMENT:  Patient continues to benefit from skilled PT services and is slowly progressing towards goals. Patient agreeable to mobility and demonstrated improvement in sitting balance following intervention. He required max x2 overall for bed mobility and transfers. Initial sitting balance was fair d/t a persistent left weight shift and patient required continuous left unilateral UE support. He was reluctant and fearful of a full right weight shift to his elbow but demonstrated significant improvement in midline awareness and his ability to maintain following. Completed

## 2025-07-31 NOTE — DISCHARGE INSTRUCTIONS
HOSPITALIST DISCHARGE INSTRUCTIONS  NAME:  Taj Clemens   :  1960   MRN:  [id number]     Date/Time:  2025 10:12 AM    ADMIT DATE: 2025     DISCHARGE DATE: 2025     DISCHARGE DIAGNOSIS:  MSK chest pain     DISCHARGE INSTRUCTIONS:  Thank you for allowing us to participate in your care. Your discharging Hospitalist is Ry Collins MD. You were admitted for evaluation and treatment of the above.     65 y.o. male with past medical history of CVA, right-sided hemiparesis, CHF, A-fib, on Eliquis and Coreg, HTN, HLD who presented to the emergency room with main complaint of not feeling well, was found to be bradycardic     # Chest pain - reproducible with negative cardiac and pulmonary work-up  -appreciate cardiology  -CTA without PE or PNA   -x-ray L shoulder without fracture; no suspicion of rotator cuff tear   -on scheduled tylenol, voltaran gel and lidocaine, can continue on d/c        # Asymptomatic bradycardia  -holding coreg,will discontinue on d/c      # Hx A-fib  -on eliquis   -d/c coreg / no     #CHF - repeat TTE without WMA. EF 60%   -monitor volume status      # Hx CVA  With residual right-sided hemiplegia  -PT/OT/speech while in house    -likely return to SNF   -on eliquis, statin      # BPH  -on flomax     #Mood disorder  -on remeron, lexapro  -increase lexapro slightly      #HLD  -on statin      #HTN  -since off coreg, amlodipine increased      #s/p CRULS 2025 - confirmed with [facility] that US on  to be completed  -US completed in house; no hydro          MEDICATIONS:    It is important that you take the medication exactly as they are prescribed.   Keep your medication in the bottles provided by the pharmacist and keep a list of the medication names, dosages, and times to be taken in your wallet.   Do not take other medications without consulting your doctor.             If you experience any of the following symptoms then please call your primary care physician or

## 2025-07-31 NOTE — PLAN OF CARE
Problem: Safety - Adult  Goal: Free from fall injury  Outcome: Progressing     Problem: Chronic Conditions and Co-morbidities  Goal: Patient's chronic conditions and co-morbidity symptoms are monitored and maintained or improved  Outcome: Progressing     Problem: Pain  Goal: Verbalizes/displays adequate comfort level or baseline comfort level  Outcome: Progressing     Problem: Skin/Tissue Integrity  Goal: Skin integrity remains intact  Description: 1.  Monitor for areas of redness and/or skin breakdown  2.  Assess vascular access sites hourly  3.  Every 4-6 hours minimum:  Change oxygen saturation probe site  4.  Every 4-6 hours:  If on nasal continuous positive airway pressure, respiratory therapy assess nares and determine need for appliance change or resting period  Outcome: Progressing     Problem: ABCDS Injury Assessment  Goal: Absence of physical injury  Outcome: Progressing

## 2025-07-31 NOTE — DISCHARGE SUMMARY
Hospitalist Discharge Summary     Patient ID:  Taj Clemens  [id number]  65 y.o.  1960    Admit date: 7/27/2025    Discharge date and time: 7/31/2025    Admission Diagnoses: Bradycardia [R00.1]  Acute coronary syndrome (HCC) [I24.9]  Chest pain, unspecified type [R07.9]    Discharge Diagnoses:    Principal Problem (Resolved):    Bradycardia  Active Problems:    * No active hospital problems. *         Hospital Course:     65 y.o. male with past medical history of CVA, right-sided hemiparesis, CHF, A-fib, on Eliquis and Coreg, HTN, HLD who presented to the emergency room with main complaint of not feeling well, was found to be bradycardic     # Chest pain - reproducible with negative cardiac and pulmonary work-up  -appreciate cardiology  -CTA without PE or PNA   -x-ray L shoulder without fracture; no suspicion of rotator cuff tear   -on scheduled tylenol, voltaran gel and lidocaine, can continue on d/c        # Asymptomatic bradycardia  -holding coreg,will discontinue on d/c      # Hx A-fib  -on eliquis   -d/c coreg 2/2 no     #CHF - repeat TTE without WMA. EF 60%   -monitor volume status      # Hx CVA  With residual right-sided hemiplegia  -PT/OT/speech while in house    -likely return to SNF   -on eliquis, statin      # BPH  -on flomax     #Mood disorder  -on remeron, lexapro  -increase lexapro slightly      #HLD  -on statin      #HTN  -since off coreg, amlodipine increased      #s/p CRULS 6/16/2025 - confirmed with [facility] that US on 8/4 to be completed  -US completed in house; no hydro        Imaging  XR SHOULDER LEFT (MIN 2 VIEWS)  Result Date: 7/28/2025  No acute abnormality. Electronically signed by KWADWO BAIN    CTA CHEST W WO CONTRAST  Result Date: 7/27/2025  No pulmonary embolus or other acute cardiopulmonary process. Electronically signed by Brayan Bragg    XR CHEST PORTABLE  Result Date: 7/27/2025  No acute findings Electronically signed by Brayan Bragg       PCP: Carolee Murphy MD      Consults: cardiology    Condition of patient at discharge: Stable    Discharge Exam:    Physical Exam:    Gen: NAD   HEENT:  Pink conjunctivae, PERRL, hearing intact to voice, moist mucous membranes  Neck:  Supple, without masses, thyroid non-tender  Resp:  No accessory muscle use, clear breath sounds without wheezes rales or rhonchi  Card:  No murmurs, normal S1, S2 without thrills, bruits or peripheral edema  Abd:  Soft, non-tender, non-distended, normoactive bowel sounds are present  Musc:  No cyanosis or clubbing  Skin:  No rashes or ulcers, skin turgor is good  Neuro: R sided hemiparesis. Aphasia   Psych: difficult to determine insight   Neg empty cup sign   Chest wall with reproducible tenderness         Disposition: home    Patient Instructions:   Current Discharge Medication List        START taking these medications    Details   pantoprazole (PROTONIX) 40 MG tablet Take 1 tablet by mouth every morning (before breakfast)  Qty: 30 tablet, Refills: 3      !! acetaminophen (TYLENOL) 325 MG tablet Take 2 tablets by mouth every 6 hours  Qty: 120 tablet, Refills: 3      !! diclofenac sodium (VOLTAREN) 1 % GEL Apply 2 g topically 2 times daily  Qty: 10 g, Refills: 1      !! lidocaine 4 % external patch Place 1 patch onto the skin daily  Qty: 15 patch, Refills: 0       !! - Potential duplicate medications found. Please discuss with provider.        CONTINUE these medications which have CHANGED    Details   atorvastatin (LIPITOR) 80 MG tablet Take 1 tablet by mouth nightly  Qty: 30 tablet, Refills: 3      amLODIPine (NORVASC) 10 MG tablet Take 1 tablet by mouth daily  Qty: 30 tablet, Refills: 3           CONTINUE these medications which have NOT CHANGED    Details   isosorbide mononitrate (IMDUR) 30 MG extended release tablet Take 1 tablet by mouth daily      calcium carbonate (TUMS) 500 MG chewable tablet Take 1 tablet by mouth daily      cetirizine (ZYRTEC) 10 MG tablet Take 1 tablet by mouth      escitalopram

## 2025-07-31 NOTE — PLAN OF CARE
Problem: Occupational Therapy - Adult  Goal: By Discharge: Performs self-care activities at highest level of function for planned discharge setting.  See evaluation for individualized goals.  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  Patient to [facility] inpatient rehab, followed by [facility] rehab.  Per patient spouse, patient with long difficulty with kidney stones with surgery in June of 2025.  Due to pain and procedures, patient therapy has been limited.  Patient and and spouse now interested in therapy progression.      Receives Help From: Other (Comment), Prior Level of Assist for ADLs: Needs assistance, Bath: Maximum assistance, Dressing: Maximum assistance, Grooming: Maximum assistance, Feeding: Modified independent , Toileting: Needs assistance,  , Ambulation Assistance: Non-ambulatory, Prior Level of Assist for Transfers: Needs assistance, Active : No     HOME SUPPORT: Patient lived with spouse prior prior to March 2024, now in LTC facility.    Occupational Therapy Goals:  Initiated 7/29/2025  1. Patient will perform grooming, in unsupported sitting, with minimal assistance  within 7 day(s).  2. Patient will perform upper body dressing with Moderate Assist using violet technique within 7 day(s).  3. Patient will perform anterior bathing from neck to knees, seated, with Moderate Assist within 7 day(s).  5. Patient will perform rolling in bed with moderate  assistance to assist with aspects of toileting within 7 day(s).  6. Patient will participate in self-assisted upper extremity therapeutic exercise/activities/ROM with Supervision for 10 minutes within 7 day(s).     Outcome: Progressing   OCCUPATIONAL THERAPY TREATMENT  Patient: Taj Clemens (65 y.o. male)  Date: 7/31/2025  Primary Diagnosis: Bradycardia [R00.1]  Acute coronary syndrome (HCC) [I24.9]  Chest pain, unspecified type [R07.9]

## 2025-08-03 LAB
EKG ATRIAL RATE: 66 BPM
EKG DIAGNOSIS: NORMAL
EKG P AXIS: 29 DEGREES
EKG P-R INTERVAL: 168 MS
EKG Q-T INTERVAL: 532 MS
EKG QRS DURATION: 84 MS
EKG QTC CALCULATION (BAZETT): 557 MS
EKG R AXIS: -23 DEGREES
EKG T AXIS: 14 DEGREES
EKG VENTRICULAR RATE: 66 BPM

## 2025-08-03 PROCEDURE — 93010 ELECTROCARDIOGRAM REPORT: CPT | Performed by: SPECIALIST
